# Patient Record
Sex: FEMALE | Race: OTHER | NOT HISPANIC OR LATINO | URBAN - METROPOLITAN AREA
[De-identification: names, ages, dates, MRNs, and addresses within clinical notes are randomized per-mention and may not be internally consistent; named-entity substitution may affect disease eponyms.]

---

## 2018-02-23 ENCOUNTER — EMERGENCY (EMERGENCY)
Facility: HOSPITAL | Age: 48
LOS: 0 days | Discharge: HOME | End: 2018-02-23
Attending: EMERGENCY MEDICINE
Payer: COMMERCIAL

## 2018-02-23 VITALS — TEMPERATURE: 98 F

## 2018-02-23 VITALS
RESPIRATION RATE: 18 BRPM | SYSTOLIC BLOOD PRESSURE: 132 MMHG | WEIGHT: 266.98 LBS | OXYGEN SATURATION: 98 % | HEART RATE: 90 BPM | HEIGHT: 65 IN | TEMPERATURE: 98 F | DIASTOLIC BLOOD PRESSURE: 84 MMHG

## 2018-02-23 DIAGNOSIS — Z79.4 LONG TERM (CURRENT) USE OF INSULIN: ICD-10-CM

## 2018-02-23 DIAGNOSIS — R07.9 CHEST PAIN, UNSPECIFIED: ICD-10-CM

## 2018-02-23 DIAGNOSIS — R05 COUGH: ICD-10-CM

## 2018-02-23 DIAGNOSIS — R06.02 SHORTNESS OF BREATH: ICD-10-CM

## 2018-02-23 DIAGNOSIS — Z79.1 LONG TERM (CURRENT) USE OF NON-STEROIDAL ANTI-INFLAMMATORIES (NSAID): ICD-10-CM

## 2018-02-23 DIAGNOSIS — I34.1 NONRHEUMATIC MITRAL (VALVE) PROLAPSE: ICD-10-CM

## 2018-02-23 DIAGNOSIS — I10 ESSENTIAL (PRIMARY) HYPERTENSION: ICD-10-CM

## 2018-02-23 DIAGNOSIS — E28.2 POLYCYSTIC OVARIAN SYNDROME: ICD-10-CM

## 2018-02-23 DIAGNOSIS — J40 BRONCHITIS, NOT SPECIFIED AS ACUTE OR CHRONIC: ICD-10-CM

## 2018-02-23 DIAGNOSIS — E03.9 HYPOTHYROIDISM, UNSPECIFIED: ICD-10-CM

## 2018-02-23 DIAGNOSIS — D17.9 BENIGN LIPOMATOUS NEOPLASM, UNSPECIFIED: ICD-10-CM

## 2018-02-23 DIAGNOSIS — D25.9 LEIOMYOMA OF UTERUS, UNSPECIFIED: ICD-10-CM

## 2018-02-23 DIAGNOSIS — K21.9 GASTRO-ESOPHAGEAL REFLUX DISEASE WITHOUT ESOPHAGITIS: ICD-10-CM

## 2018-02-23 DIAGNOSIS — E11.9 TYPE 2 DIABETES MELLITUS WITHOUT COMPLICATIONS: ICD-10-CM

## 2018-02-23 LAB
ALBUMIN SERPL ELPH-MCNC: 3.4 G/DL — SIGNIFICANT CHANGE UP (ref 3.3–5)
ALP SERPL-CCNC: 126 U/L — HIGH (ref 40–120)
ALT FLD-CCNC: 36 U/L — SIGNIFICANT CHANGE UP (ref 12–78)
ANION GAP SERPL CALC-SCNC: 11 MMOL/L — SIGNIFICANT CHANGE UP (ref 5–17)
APTT BLD: 28 SEC — SIGNIFICANT CHANGE UP (ref 27.5–37.4)
AST SERPL-CCNC: 19 U/L — SIGNIFICANT CHANGE UP (ref 15–37)
BASOPHILS # BLD AUTO: 0 K/UL — SIGNIFICANT CHANGE UP (ref 0–0.2)
BASOPHILS NFR BLD AUTO: 0 % — SIGNIFICANT CHANGE UP (ref 0–2)
BILIRUB SERPL-MCNC: 0.3 MG/DL — SIGNIFICANT CHANGE UP (ref 0.2–1.2)
BUN SERPL-MCNC: 10 MG/DL — SIGNIFICANT CHANGE UP (ref 7–23)
CALCIUM SERPL-MCNC: 8.9 MG/DL — SIGNIFICANT CHANGE UP (ref 8.5–10.1)
CHLORIDE SERPL-SCNC: 101 MMOL/L — SIGNIFICANT CHANGE UP (ref 96–108)
CK MB BLD-MCNC: 1.5 % — SIGNIFICANT CHANGE UP (ref 0–3.5)
CK MB CFR SERPL CALC: 0.8 NG/ML — SIGNIFICANT CHANGE UP (ref 0.5–3.6)
CK SERPL-CCNC: 52 U/L — SIGNIFICANT CHANGE UP (ref 26–192)
CO2 SERPL-SCNC: 28 MMOL/L — SIGNIFICANT CHANGE UP (ref 22–31)
CREAT SERPL-MCNC: 0.75 MG/DL — SIGNIFICANT CHANGE UP (ref 0.5–1.3)
D DIMER BLD IA.RAPID-MCNC: 188 NG/ML DDU — SIGNIFICANT CHANGE UP
EOSINOPHIL # BLD AUTO: 0 K/UL — SIGNIFICANT CHANGE UP (ref 0–0.5)
EOSINOPHIL NFR BLD AUTO: 0 % — SIGNIFICANT CHANGE UP (ref 0–6)
FLUAV SPEC QL CULT: NEGATIVE — SIGNIFICANT CHANGE UP
FLUBV AG SPEC QL IA: NEGATIVE — SIGNIFICANT CHANGE UP
GIANT PLATELETS BLD QL SMEAR: PRESENT — SIGNIFICANT CHANGE UP
GLUCOSE SERPL-MCNC: 229 MG/DL — HIGH (ref 70–99)
HCG SERPL-ACNC: 2 MIU/ML — SIGNIFICANT CHANGE UP
HCT VFR BLD CALC: 41.5 % — SIGNIFICANT CHANGE UP (ref 34.5–45)
HGB BLD-MCNC: 13.1 G/DL — SIGNIFICANT CHANGE UP (ref 11.5–15.5)
INR BLD: 0.96 RATIO — SIGNIFICANT CHANGE UP (ref 0.88–1.16)
LACTATE SERPL-SCNC: 1.9 MMOL/L — SIGNIFICANT CHANGE UP (ref 0.7–2)
LYMPHOCYTES # BLD AUTO: 4.12 K/UL — HIGH (ref 1–3.3)
LYMPHOCYTES # BLD AUTO: 41 % — SIGNIFICANT CHANGE UP (ref 13–44)
MANUAL SMEAR VERIFICATION: SIGNIFICANT CHANGE UP
MCHC RBC-ENTMCNC: 27 PG — SIGNIFICANT CHANGE UP (ref 27–34)
MCHC RBC-ENTMCNC: 31.6 GM/DL — LOW (ref 32–36)
MCV RBC AUTO: 85.6 FL — SIGNIFICANT CHANGE UP (ref 80–100)
METAMYELOCYTES # FLD: 1 — SIGNIFICANT CHANGE UP
MONOCYTES # BLD AUTO: 0.1 K/UL — SIGNIFICANT CHANGE UP (ref 0–0.9)
MONOCYTES NFR BLD AUTO: 1 % — LOW (ref 2–14)
NEUTROPHILS # BLD AUTO: 5.23 K/UL — SIGNIFICANT CHANGE UP (ref 1.8–7.4)
NEUTROPHILS NFR BLD AUTO: 52 % — SIGNIFICANT CHANGE UP (ref 43–77)
NRBC # BLD: 0 — SIGNIFICANT CHANGE UP
NRBC # BLD: SIGNIFICANT CHANGE UP /100 WBCS (ref 0–0)
NT-PROBNP SERPL-SCNC: 33 PG/ML — SIGNIFICANT CHANGE UP (ref 0–125)
PLAT MORPH BLD: NORMAL — SIGNIFICANT CHANGE UP
PLATELET # BLD AUTO: 354 K/UL — SIGNIFICANT CHANGE UP (ref 150–400)
POTASSIUM SERPL-MCNC: 3.8 MMOL/L — SIGNIFICANT CHANGE UP (ref 3.5–5.3)
POTASSIUM SERPL-SCNC: 3.8 MMOL/L — SIGNIFICANT CHANGE UP (ref 3.5–5.3)
PROT SERPL-MCNC: 8 GM/DL — SIGNIFICANT CHANGE UP (ref 6–8.3)
PROTHROM AB SERPL-ACNC: 10.5 SEC — SIGNIFICANT CHANGE UP (ref 9.8–12.7)
RBC # BLD: 4.85 M/UL — SIGNIFICANT CHANGE UP (ref 3.8–5.2)
RBC # FLD: 13.5 % — SIGNIFICANT CHANGE UP (ref 10.3–14.5)
RBC BLD AUTO: NORMAL — SIGNIFICANT CHANGE UP
SODIUM SERPL-SCNC: 140 MMOL/L — SIGNIFICANT CHANGE UP (ref 135–145)
TROPONIN I SERPL-MCNC: <.015 NG/ML — SIGNIFICANT CHANGE UP (ref 0.01–0.04)
VARIANT LYMPHS # BLD: 5 % — SIGNIFICANT CHANGE UP (ref 0–6)
WBC # BLD: 10.05 K/UL — SIGNIFICANT CHANGE UP (ref 3.8–10.5)
WBC # FLD AUTO: 10.05 K/UL — SIGNIFICANT CHANGE UP (ref 3.8–10.5)

## 2018-02-23 PROCEDURE — 71260 CT THORAX DX C+: CPT | Mod: 26

## 2018-02-23 PROCEDURE — 99285 EMERGENCY DEPT VISIT HI MDM: CPT

## 2018-02-23 PROCEDURE — 71045 X-RAY EXAM CHEST 1 VIEW: CPT | Mod: 26

## 2018-02-23 RX ORDER — ALBUTEROL 90 UG/1
2 AEROSOL, METERED ORAL
Qty: 1 | Refills: 0
Start: 2018-02-23 | End: 2018-03-24

## 2018-02-23 RX ORDER — IPRATROPIUM/ALBUTEROL SULFATE 18-103MCG
3 AEROSOL WITH ADAPTER (GRAM) INHALATION ONCE
Qty: 0 | Refills: 0 | Status: COMPLETED | OUTPATIENT
Start: 2018-02-23 | End: 2018-02-23

## 2018-02-23 RX ORDER — ALBUTEROL 90 UG/1
3 AEROSOL, METERED ORAL
Qty: 120 | Refills: 0
Start: 2018-02-23 | End: 2018-03-04

## 2018-02-23 RX ORDER — AZITHROMYCIN 500 MG/1
500 TABLET, FILM COATED ORAL ONCE
Qty: 0 | Refills: 0 | Status: COMPLETED | OUTPATIENT
Start: 2018-02-23 | End: 2018-02-23

## 2018-02-23 RX ORDER — MAGNESIUM SULFATE 500 MG/ML
2 VIAL (ML) INJECTION ONCE
Qty: 0 | Refills: 0 | Status: COMPLETED | OUTPATIENT
Start: 2018-02-23 | End: 2018-02-23

## 2018-02-23 RX ORDER — AZITHROMYCIN 500 MG/1
1 TABLET, FILM COATED ORAL
Qty: 4 | Refills: 0
Start: 2018-02-23 | End: 2018-02-26

## 2018-02-23 RX ADMIN — AZITHROMYCIN 500 MILLIGRAM(S): 500 TABLET, FILM COATED ORAL at 20:22

## 2018-02-23 RX ADMIN — Medication 50 GRAM(S): at 16:49

## 2018-02-23 RX ADMIN — Medication 125 MILLIGRAM(S): at 16:49

## 2018-02-23 RX ADMIN — Medication 3 MILLILITER(S): at 16:49

## 2018-02-23 NOTE — ED PROVIDER NOTE - PSH
Section    Cholecystectomy    h/o Removal of  Cyst of Bartholin's gland    Lipoma of back  removal 2012  S/P   x 2 in ,   S/P cholecystectomy  1984

## 2018-02-23 NOTE — ED ADULT TRIAGE NOTE - CHIEF COMPLAINT QUOTE
Shortness of breath on exertion , coughing x 1.5 months , progressively worse , fever , headaches ,   on and off chills , night sweat.   h/o reactive airways

## 2018-02-23 NOTE — ED ADULT NURSE REASSESSMENT NOTE - NS ED NURSE REASSESS COMMENT FT1
Awake, alert, Vital signs stable, tolerating fluids well.Verbal/written discharge instructions given and reviewed with patient and  with understanding. Questions answered and verbalized good understanding. Discharge criteria met. Discharged home as per protocol. Telemetry d/c, IVL removed.

## 2018-02-23 NOTE — ED ADULT NURSE NOTE - OBJECTIVE STATEMENT
46 yo female c/o SOB, flu like s/s, saw pulmonologist, s/p Azithromycin and Levaquin, coughing and fever x 1.5 months, finished antibiotic tx x 2 days ago. pmh pulmonary reactive disease from ground zero. pt speaking in complete sentences,

## 2018-02-23 NOTE — ED PROVIDER NOTE - OBJECTIVE STATEMENT
47 year old female with PMH of DM II , HTN, HLD, PCOS, previous reactive airway disease secondary to 9/11 otherwise has had cough and URI symptoms for past month, on and off improving then worsening. Pt states otherwise had taken and finished 10 day course of levaquin recently as well as continuing nebs, medrol dose pack before.
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
## 2018-02-23 NOTE — ED PROVIDER NOTE - MEDICAL DECISION MAKING DETAILS
pt otherwise without wheezing in ED that is active, to dc with prednisone and azithromycin and given follow up with pulmonology.

## 2018-08-16 ENCOUNTER — APPOINTMENT (OUTPATIENT)
Dept: ORTHOPEDIC SURGERY | Facility: CLINIC | Age: 48
End: 2018-08-16
Payer: COMMERCIAL

## 2018-08-16 VITALS — HEIGHT: 65 IN | HEART RATE: 93 BPM | DIASTOLIC BLOOD PRESSURE: 82 MMHG | SYSTOLIC BLOOD PRESSURE: 136 MMHG

## 2018-08-16 DIAGNOSIS — Z80.0 FAMILY HISTORY OF MALIGNANT NEOPLASM OF DIGESTIVE ORGANS: ICD-10-CM

## 2018-08-16 DIAGNOSIS — Z78.9 OTHER SPECIFIED HEALTH STATUS: ICD-10-CM

## 2018-08-16 DIAGNOSIS — M54.16 RADICULOPATHY, LUMBAR REGION: ICD-10-CM

## 2018-08-16 DIAGNOSIS — Z87.39 PERSONAL HISTORY OF OTHER DISEASES OF THE MUSCULOSKELETAL SYSTEM AND CONNECTIVE TISSUE: ICD-10-CM

## 2018-08-16 DIAGNOSIS — D17.9 BENIGN LIPOMATOUS NEOPLASM, UNSPECIFIED: ICD-10-CM

## 2018-08-16 DIAGNOSIS — Z86.39 PERSONAL HISTORY OF OTHER ENDOCRINE, NUTRITIONAL AND METABOLIC DISEASE: ICD-10-CM

## 2018-08-16 DIAGNOSIS — M47.812 SPONDYLOSIS W/OUT MYELOPATHY OR RADICULOPATHY, CERVICAL REGION: ICD-10-CM

## 2018-08-16 DIAGNOSIS — Z87.09 PERSONAL HISTORY OF OTHER DISEASES OF THE RESPIRATORY SYSTEM: ICD-10-CM

## 2018-08-16 DIAGNOSIS — M54.12 RADICULOPATHY, CERVICAL REGION: ICD-10-CM

## 2018-08-16 DIAGNOSIS — Z87.19 PERSONAL HISTORY OF OTHER DISEASES OF THE DIGESTIVE SYSTEM: ICD-10-CM

## 2018-08-16 PROCEDURE — 72040 X-RAY EXAM NECK SPINE 2-3 VW: CPT

## 2018-08-16 PROCEDURE — 72100 X-RAY EXAM L-S SPINE 2/3 VWS: CPT

## 2018-08-16 PROCEDURE — 99204 OFFICE O/P NEW MOD 45 MIN: CPT

## 2018-08-16 PROCEDURE — 72070 X-RAY EXAM THORAC SPINE 2VWS: CPT

## 2018-08-16 RX ORDER — GABAPENTIN ENACARBIL 300 MG/1
TABLET, EXTENDED RELEASE ORAL
Refills: 0 | Status: ACTIVE | COMMUNITY

## 2019-09-12 ENCOUNTER — EMERGENCY (EMERGENCY)
Facility: HOSPITAL | Age: 49
LOS: 0 days | Discharge: HOME | End: 2019-09-12
Attending: EMERGENCY MEDICINE
Payer: COMMERCIAL

## 2019-09-12 VITALS
TEMPERATURE: 98 F | RESPIRATION RATE: 20 BRPM | DIASTOLIC BLOOD PRESSURE: 116 MMHG | SYSTOLIC BLOOD PRESSURE: 151 MMHG | HEIGHT: 65 IN | HEART RATE: 79 BPM | WEIGHT: 279.99 LBS | OXYGEN SATURATION: 100 %

## 2019-09-12 DIAGNOSIS — I10 ESSENTIAL (PRIMARY) HYPERTENSION: ICD-10-CM

## 2019-09-12 DIAGNOSIS — D25.9 LEIOMYOMA OF UTERUS, UNSPECIFIED: ICD-10-CM

## 2019-09-12 DIAGNOSIS — N61.1 ABSCESS OF THE BREAST AND NIPPLE: ICD-10-CM

## 2019-09-12 DIAGNOSIS — E66.9 OBESITY, UNSPECIFIED: ICD-10-CM

## 2019-09-12 DIAGNOSIS — E11.9 TYPE 2 DIABETES MELLITUS WITHOUT COMPLICATIONS: ICD-10-CM

## 2019-09-12 DIAGNOSIS — J45.909 UNSPECIFIED ASTHMA, UNCOMPLICATED: ICD-10-CM

## 2019-09-12 DIAGNOSIS — I34.1 NONRHEUMATIC MITRAL (VALVE) PROLAPSE: ICD-10-CM

## 2019-09-12 DIAGNOSIS — E28.2 POLYCYSTIC OVARIAN SYNDROME: ICD-10-CM

## 2019-09-12 DIAGNOSIS — E78.5 HYPERLIPIDEMIA, UNSPECIFIED: ICD-10-CM

## 2019-09-12 DIAGNOSIS — Z79.4 LONG TERM (CURRENT) USE OF INSULIN: ICD-10-CM

## 2019-09-12 DIAGNOSIS — Q05.5 CERVICAL SPINA BIFIDA WITHOUT HYDROCEPHALUS: ICD-10-CM

## 2019-09-12 DIAGNOSIS — Q05.8 SACRAL SPINA BIFIDA WITHOUT HYDROCEPHALUS: ICD-10-CM

## 2019-09-12 DIAGNOSIS — K56.609 UNSPECIFIED INTESTINAL OBSTRUCTION, UNSPECIFIED AS TO PARTIAL VERSUS COMPLETE OBSTRUCTION: ICD-10-CM

## 2019-09-12 DIAGNOSIS — K21.9 GASTRO-ESOPHAGEAL REFLUX DISEASE WITHOUT ESOPHAGITIS: ICD-10-CM

## 2019-09-12 DIAGNOSIS — E03.9 HYPOTHYROIDISM, UNSPECIFIED: ICD-10-CM

## 2019-09-12 PROCEDURE — 99283 EMERGENCY DEPT VISIT LOW MDM: CPT

## 2019-09-12 RX ORDER — IBUPROFEN 200 MG
600 TABLET ORAL ONCE
Refills: 0 | Status: COMPLETED | OUTPATIENT
Start: 2019-09-12 | End: 2019-09-12

## 2019-09-12 RX ORDER — IBUPROFEN 200 MG
1 TABLET ORAL
Qty: 28 | Refills: 0
Start: 2019-09-12 | End: 2019-09-18

## 2019-09-12 RX ADMIN — Medication 600 MILLIGRAM(S): at 21:51

## 2019-09-12 RX ADMIN — Medication 1 TABLET(S): at 21:51

## 2019-09-12 NOTE — ED ADULT NURSE NOTE - OBJECTIVE STATEMENT
Pt a&o x3, pt states 1 year ago noticed small black head to inner left breast. pt states has been gradually getting bigger. Noted regular borders and increasing in side with pustule head. As per pt this week popped this week with draining and large black exudate came out. At this time abscess to left breast still red swollen. Pain 6/10. pt denies taking any pain meds at home.

## 2019-09-12 NOTE — ED ADULT TRIAGE NOTE - CHIEF COMPLAINT QUOTE
Pt c/o Left breast abscess that's opened and draining. Pt went was seen at urgent care and was told to come to ED. H/o DM, HTN

## 2019-09-12 NOTE — ED PROVIDER NOTE - CLINICAL SUMMARY MEDICAL DECISION MAKING FREE TEXT BOX
Ddx: Breast abscess, already draining, no fluctuance  Plan: Abx, referral to general surgery, no indication to further open as wound already draining and open. Return precautions given.

## 2019-09-12 NOTE — ED PROVIDER NOTE - PATIENT PORTAL LINK FT
You can access the FollowMyHealth Patient Portal offered by [facility] by registering at the following website: [email or web address]. By joining [employer]’s FollowMyHealth portal, you will also be able to view your health information using other applications (apps) compatible with our system.
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
## 2019-09-12 NOTE — ED PROVIDER NOTE - OBJECTIVE STATEMENT
Pt is a 48 yo lady with a pmhx of NIDM, HTN, HL who presents to the ED with breast abscess. It started 2 days ago and had a blackhead that then came out and has already drained pus. No fevers, no redness, no increase in breast tenderness.

## 2019-09-12 NOTE — ED PROVIDER NOTE - SKIN COLOR
L. medial breast has 1 cm dm opening of abscess, slight drainage of purulent fluid. Not tense, no erythema, minimal warmth. Shanell BURLESON

## 2019-12-03 ENCOUNTER — EMERGENCY (EMERGENCY)
Facility: HOSPITAL | Age: 49
LOS: 2 days | Discharge: HOME | End: 2019-12-06
Attending: EMERGENCY MEDICINE
Payer: COMMERCIAL

## 2019-12-03 DIAGNOSIS — E04.9 NONTOXIC GOITER, UNSPECIFIED: ICD-10-CM

## 2019-12-03 DIAGNOSIS — G50.0 TRIGEMINAL NEURALGIA: ICD-10-CM

## 2019-12-03 DIAGNOSIS — E11.9 TYPE 2 DIABETES MELLITUS WITHOUT COMPLICATIONS: ICD-10-CM

## 2019-12-03 DIAGNOSIS — Z79.4 LONG TERM (CURRENT) USE OF INSULIN: ICD-10-CM

## 2019-12-03 DIAGNOSIS — K21.9 GASTRO-ESOPHAGEAL REFLUX DISEASE WITHOUT ESOPHAGITIS: ICD-10-CM

## 2019-12-03 DIAGNOSIS — I10 ESSENTIAL (PRIMARY) HYPERTENSION: ICD-10-CM

## 2019-12-03 DIAGNOSIS — R07.9 CHEST PAIN, UNSPECIFIED: ICD-10-CM

## 2019-12-03 DIAGNOSIS — E78.5 HYPERLIPIDEMIA, UNSPECIFIED: ICD-10-CM

## 2019-12-03 DIAGNOSIS — J45.30 MILD PERSISTENT ASTHMA, UNCOMPLICATED: ICD-10-CM

## 2019-12-03 PROCEDURE — 99219: CPT

## 2019-12-03 PROCEDURE — 99285 EMERGENCY DEPT VISIT HI MDM: CPT

## 2019-12-04 VITALS
RESPIRATION RATE: 20 BRPM | TEMPERATURE: 98 F | SYSTOLIC BLOOD PRESSURE: 141 MMHG | OXYGEN SATURATION: 99 % | HEART RATE: 92 BPM | HEIGHT: 65 IN | WEIGHT: 270.07 LBS | DIASTOLIC BLOOD PRESSURE: 85 MMHG

## 2019-12-04 DIAGNOSIS — I10 ESSENTIAL (PRIMARY) HYPERTENSION: ICD-10-CM

## 2019-12-04 DIAGNOSIS — E11.9 TYPE 2 DIABETES MELLITUS WITHOUT COMPLICATIONS: ICD-10-CM

## 2019-12-04 DIAGNOSIS — R07.9 CHEST PAIN, UNSPECIFIED: ICD-10-CM

## 2019-12-04 DIAGNOSIS — J45.30 MILD PERSISTENT ASTHMA, UNCOMPLICATED: ICD-10-CM

## 2019-12-04 DIAGNOSIS — G50.0 TRIGEMINAL NEURALGIA: ICD-10-CM

## 2019-12-04 DIAGNOSIS — Z29.9 ENCOUNTER FOR PROPHYLACTIC MEASURES, UNSPECIFIED: ICD-10-CM

## 2019-12-04 PROBLEM — J45.909 UNSPECIFIED ASTHMA, UNCOMPLICATED: Chronic | Status: ACTIVE | Noted: 2019-09-12

## 2019-12-04 LAB
ALBUMIN SERPL ELPH-MCNC: 3 G/DL — LOW (ref 3.3–5)
ALP SERPL-CCNC: 113 U/L — SIGNIFICANT CHANGE UP (ref 40–120)
ALT FLD-CCNC: 32 U/L — SIGNIFICANT CHANGE UP (ref 12–78)
ANION GAP SERPL CALC-SCNC: 7 MMOL/L — SIGNIFICANT CHANGE UP (ref 5–17)
APTT BLD: 27.2 SEC — LOW (ref 27.5–36.3)
AST SERPL-CCNC: 27 U/L — SIGNIFICANT CHANGE UP (ref 15–37)
BASOPHILS # BLD AUTO: 0.06 K/UL — SIGNIFICANT CHANGE UP (ref 0–0.2)
BASOPHILS NFR BLD AUTO: 0.5 % — SIGNIFICANT CHANGE UP (ref 0–2)
BILIRUB SERPL-MCNC: 0.4 MG/DL — SIGNIFICANT CHANGE UP (ref 0.2–1.2)
BUN SERPL-MCNC: 14 MG/DL — SIGNIFICANT CHANGE UP (ref 7–23)
CALCIUM SERPL-MCNC: 8.9 MG/DL — SIGNIFICANT CHANGE UP (ref 8.5–10.1)
CHLORIDE SERPL-SCNC: 102 MMOL/L — SIGNIFICANT CHANGE UP (ref 96–108)
CHOLEST SERPL-MCNC: 210 MG/DL — HIGH (ref 10–199)
CO2 SERPL-SCNC: 27 MMOL/L — SIGNIFICANT CHANGE UP (ref 22–31)
CREAT SERPL-MCNC: 0.79 MG/DL — SIGNIFICANT CHANGE UP (ref 0.5–1.3)
EOSINOPHIL # BLD AUTO: 0.07 K/UL — SIGNIFICANT CHANGE UP (ref 0–0.5)
EOSINOPHIL NFR BLD AUTO: 0.6 % — SIGNIFICANT CHANGE UP (ref 0–6)
GLUCOSE BLDC GLUCOMTR-MCNC: 239 MG/DL — HIGH (ref 70–99)
GLUCOSE BLDC GLUCOMTR-MCNC: 276 MG/DL — HIGH (ref 70–99)
GLUCOSE BLDC GLUCOMTR-MCNC: 311 MG/DL — HIGH (ref 70–99)
GLUCOSE SERPL-MCNC: 295 MG/DL — HIGH (ref 70–99)
HBA1C BLD-MCNC: 12.1 % — HIGH (ref 4–5.6)
HCT VFR BLD CALC: 41.3 % — SIGNIFICANT CHANGE UP (ref 34.5–45)
HDLC SERPL-MCNC: 37 MG/DL — LOW
HGB BLD-MCNC: 13.1 G/DL — SIGNIFICANT CHANGE UP (ref 11.5–15.5)
IMM GRANULOCYTES NFR BLD AUTO: 0.5 % — SIGNIFICANT CHANGE UP (ref 0–1.5)
INR BLD: 1.03 RATIO — SIGNIFICANT CHANGE UP (ref 0.88–1.16)
LIDOCAIN IGE QN: 117 U/L — SIGNIFICANT CHANGE UP (ref 73–393)
LIPID PNL WITH DIRECT LDL SERPL: 131 MG/DL — HIGH
LYMPHOCYTES # BLD AUTO: 3.82 K/UL — HIGH (ref 1–3.3)
LYMPHOCYTES # BLD AUTO: 34 % — SIGNIFICANT CHANGE UP (ref 13–44)
MCHC RBC-ENTMCNC: 27.6 PG — SIGNIFICANT CHANGE UP (ref 27–34)
MCHC RBC-ENTMCNC: 31.7 GM/DL — LOW (ref 32–36)
MCV RBC AUTO: 87.1 FL — SIGNIFICANT CHANGE UP (ref 80–100)
MONOCYTES # BLD AUTO: 0.71 K/UL — SIGNIFICANT CHANGE UP (ref 0–0.9)
MONOCYTES NFR BLD AUTO: 6.3 % — SIGNIFICANT CHANGE UP (ref 2–14)
NEUTROPHILS # BLD AUTO: 6.52 K/UL — SIGNIFICANT CHANGE UP (ref 1.8–7.4)
NEUTROPHILS NFR BLD AUTO: 58.1 % — SIGNIFICANT CHANGE UP (ref 43–77)
NRBC # BLD: 0 /100 WBCS — SIGNIFICANT CHANGE UP (ref 0–0)
PLATELET # BLD AUTO: 256 K/UL — SIGNIFICANT CHANGE UP (ref 150–400)
POTASSIUM SERPL-MCNC: 4.2 MMOL/L — SIGNIFICANT CHANGE UP (ref 3.5–5.3)
POTASSIUM SERPL-SCNC: 4.2 MMOL/L — SIGNIFICANT CHANGE UP (ref 3.5–5.3)
PROT SERPL-MCNC: 7.8 GM/DL — SIGNIFICANT CHANGE UP (ref 6–8.3)
PROTHROM AB SERPL-ACNC: 11.5 SEC — SIGNIFICANT CHANGE UP (ref 10–12.9)
RBC # BLD: 4.74 M/UL — SIGNIFICANT CHANGE UP (ref 3.8–5.2)
RBC # FLD: 13.4 % — SIGNIFICANT CHANGE UP (ref 10.3–14.5)
SODIUM SERPL-SCNC: 136 MMOL/L — SIGNIFICANT CHANGE UP (ref 135–145)
TOTAL CHOLESTEROL/HDL RATIO MEASUREMENT: 5.7 RATIO — SIGNIFICANT CHANGE UP (ref 3.3–7.1)
TRIGL SERPL-MCNC: 211 MG/DL — HIGH (ref 10–149)
TROPONIN I SERPL-MCNC: <.015 NG/ML — SIGNIFICANT CHANGE UP (ref 0.01–0.04)
TROPONIN I SERPL-MCNC: <.015 NG/ML — SIGNIFICANT CHANGE UP (ref 0.01–0.04)
WBC # BLD: 11.24 K/UL — HIGH (ref 3.8–10.5)
WBC # FLD AUTO: 11.24 K/UL — HIGH (ref 3.8–10.5)

## 2019-12-04 PROCEDURE — 71275 CT ANGIOGRAPHY CHEST: CPT | Mod: 26

## 2019-12-04 PROCEDURE — 78452 HT MUSCLE IMAGE SPECT MULT: CPT | Mod: 26

## 2019-12-04 PROCEDURE — 93010 ELECTROCARDIOGRAM REPORT: CPT

## 2019-12-04 PROCEDURE — 99235 HOSP IP/OBS SAME DATE MOD 70: CPT

## 2019-12-04 PROCEDURE — 71045 X-RAY EXAM CHEST 1 VIEW: CPT | Mod: 26

## 2019-12-04 PROCEDURE — 93306 TTE W/DOPPLER COMPLETE: CPT | Mod: 26

## 2019-12-04 PROCEDURE — 99285 EMERGENCY DEPT VISIT HI MDM: CPT

## 2019-12-04 RX ORDER — ALBUTEROL 90 UG/1
2.5 AEROSOL, METERED ORAL EVERY 6 HOURS
Refills: 0 | Status: DISCONTINUED | OUTPATIENT
Start: 2019-12-04 | End: 2019-12-04

## 2019-12-04 RX ORDER — SODIUM CHLORIDE 9 MG/ML
1000 INJECTION, SOLUTION INTRAVENOUS
Refills: 0 | Status: DISCONTINUED | OUTPATIENT
Start: 2019-12-04 | End: 2019-12-06

## 2019-12-04 RX ORDER — PANTOPRAZOLE SODIUM 20 MG/1
40 TABLET, DELAYED RELEASE ORAL
Refills: 0 | Status: DISCONTINUED | OUTPATIENT
Start: 2019-12-04 | End: 2019-12-06

## 2019-12-04 RX ORDER — IPRATROPIUM/ALBUTEROL SULFATE 18-103MCG
3 AEROSOL WITH ADAPTER (GRAM) INHALATION ONCE
Refills: 0 | Status: COMPLETED | OUTPATIENT
Start: 2019-12-04 | End: 2019-12-04

## 2019-12-04 RX ORDER — DEXTROSE 50 % IN WATER 50 %
25 SYRINGE (ML) INTRAVENOUS ONCE
Refills: 0 | Status: DISCONTINUED | OUTPATIENT
Start: 2019-12-04 | End: 2019-12-06

## 2019-12-04 RX ORDER — GLUCAGON INJECTION, SOLUTION 0.5 MG/.1ML
1 INJECTION, SOLUTION SUBCUTANEOUS ONCE
Refills: 0 | Status: DISCONTINUED | OUTPATIENT
Start: 2019-12-04 | End: 2019-12-06

## 2019-12-04 RX ORDER — DEXTROSE 50 % IN WATER 50 %
15 SYRINGE (ML) INTRAVENOUS ONCE
Refills: 0 | Status: DISCONTINUED | OUTPATIENT
Start: 2019-12-04 | End: 2019-12-06

## 2019-12-04 RX ORDER — LISINOPRIL 2.5 MG/1
20 TABLET ORAL EVERY 12 HOURS
Refills: 0 | Status: DISCONTINUED | OUTPATIENT
Start: 2019-12-04 | End: 2019-12-06

## 2019-12-04 RX ORDER — ACETAMINOPHEN 500 MG
650 TABLET ORAL ONCE
Refills: 0 | Status: COMPLETED | OUTPATIENT
Start: 2019-12-04 | End: 2019-12-04

## 2019-12-04 RX ORDER — DEXTROSE 50 % IN WATER 50 %
12.5 SYRINGE (ML) INTRAVENOUS ONCE
Refills: 0 | Status: DISCONTINUED | OUTPATIENT
Start: 2019-12-04 | End: 2019-12-06

## 2019-12-04 RX ORDER — IPRATROPIUM/ALBUTEROL SULFATE 18-103MCG
3 AEROSOL WITH ADAPTER (GRAM) INHALATION EVERY 6 HOURS
Refills: 0 | Status: DISCONTINUED | OUTPATIENT
Start: 2019-12-04 | End: 2019-12-06

## 2019-12-04 RX ORDER — ENOXAPARIN SODIUM 100 MG/ML
40 INJECTION SUBCUTANEOUS DAILY
Refills: 0 | Status: DISCONTINUED | OUTPATIENT
Start: 2019-12-04 | End: 2019-12-06

## 2019-12-04 RX ORDER — ALBUTEROL 90 UG/1
1 AEROSOL, METERED ORAL EVERY 4 HOURS
Refills: 0 | Status: DISCONTINUED | OUTPATIENT
Start: 2019-12-04 | End: 2019-12-06

## 2019-12-04 RX ORDER — ASPIRIN/CALCIUM CARB/MAGNESIUM 324 MG
325 TABLET ORAL DAILY
Refills: 0 | Status: DISCONTINUED | OUTPATIENT
Start: 2019-12-04 | End: 2019-12-05

## 2019-12-04 RX ORDER — INSULIN LISPRO 100/ML
VIAL (ML) SUBCUTANEOUS
Refills: 0 | Status: DISCONTINUED | OUTPATIENT
Start: 2019-12-04 | End: 2019-12-06

## 2019-12-04 RX ORDER — SODIUM CHLORIDE 9 MG/ML
1000 INJECTION INTRAMUSCULAR; INTRAVENOUS; SUBCUTANEOUS ONCE
Refills: 0 | Status: COMPLETED | OUTPATIENT
Start: 2019-12-04 | End: 2019-12-04

## 2019-12-04 RX ORDER — ATORVASTATIN CALCIUM 80 MG/1
10 TABLET, FILM COATED ORAL AT BEDTIME
Refills: 0 | Status: DISCONTINUED | OUTPATIENT
Start: 2019-12-04 | End: 2019-12-06

## 2019-12-04 RX ORDER — PANTOPRAZOLE SODIUM 20 MG/1
40 TABLET, DELAYED RELEASE ORAL ONCE
Refills: 0 | Status: COMPLETED | OUTPATIENT
Start: 2019-12-04 | End: 2019-12-04

## 2019-12-04 RX ORDER — REGADENOSON 0.08 MG/ML
0.4 INJECTION, SOLUTION INTRAVENOUS ONCE
Refills: 0 | Status: COMPLETED | OUTPATIENT
Start: 2019-12-04 | End: 2019-12-04

## 2019-12-04 RX ORDER — NITROGLYCERIN 6.5 MG
0.4 CAPSULE, EXTENDED RELEASE ORAL
Refills: 0 | Status: DISCONTINUED | OUTPATIENT
Start: 2019-12-04 | End: 2019-12-06

## 2019-12-04 RX ORDER — BUDESONIDE AND FORMOTEROL FUMARATE DIHYDRATE 160; 4.5 UG/1; UG/1
2 AEROSOL RESPIRATORY (INHALATION)
Refills: 0 | Status: DISCONTINUED | OUTPATIENT
Start: 2019-12-04 | End: 2019-12-06

## 2019-12-04 RX ADMIN — Medication 650 MILLIGRAM(S): at 18:29

## 2019-12-04 RX ADMIN — Medication 325 MILLIGRAM(S): at 06:39

## 2019-12-04 RX ADMIN — ATORVASTATIN CALCIUM 10 MILLIGRAM(S): 80 TABLET, FILM COATED ORAL at 21:36

## 2019-12-04 RX ADMIN — PANTOPRAZOLE SODIUM 40 MILLIGRAM(S): 20 TABLET, DELAYED RELEASE ORAL at 22:56

## 2019-12-04 RX ADMIN — REGADENOSON 0.4 MILLIGRAM(S): 0.08 INJECTION, SOLUTION INTRAVENOUS at 12:13

## 2019-12-04 RX ADMIN — Medication 3 MILLILITER(S): at 07:46

## 2019-12-04 RX ADMIN — SODIUM CHLORIDE 1000 MILLILITER(S): 9 INJECTION INTRAMUSCULAR; INTRAVENOUS; SUBCUTANEOUS at 02:40

## 2019-12-04 RX ADMIN — BUDESONIDE AND FORMOTEROL FUMARATE DIHYDRATE 2 PUFF(S): 160; 4.5 AEROSOL RESPIRATORY (INHALATION) at 17:32

## 2019-12-04 RX ADMIN — Medication 3 MILLILITER(S): at 17:22

## 2019-12-04 RX ADMIN — SODIUM CHLORIDE 1000 MILLILITER(S): 9 INJECTION INTRAMUSCULAR; INTRAVENOUS; SUBCUTANEOUS at 03:35

## 2019-12-04 RX ADMIN — Medication 650 MILLIGRAM(S): at 17:22

## 2019-12-04 RX ADMIN — LISINOPRIL 20 MILLIGRAM(S): 2.5 TABLET ORAL at 17:23

## 2019-12-04 RX ADMIN — Medication 3 MILLILITER(S): at 23:58

## 2019-12-04 NOTE — H&P ADULT - HISTORY OF PRESENT ILLNESS
Patient is a 50 YO F with a PMH of HTN, DM, reactive airway disease, LBP secondary to multiple herniated discs and trigeminal neuralgia who presents the ED for chest pain.  Patient reports for the past three days she has had stinging chest pain substernally and underneath her L breast.  Patient reports that episodes last less than 30 seconds and denies associated symptoms like palpitations, dyspnea, lightheadedness dizziness, and syncope.  Patient reports that during one of the episodes she took 3 aspirin which significantly improved her symptoms.  Patient reports that she is non-compliant with her medications for HTN and DM.  Had labs done three days ago and was told her A1c was 12.8.  Patient has no other complaints.      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

## 2019-12-04 NOTE — H&P ADULT - NSHPLABSRESULTS_GEN_ALL_CORE
Recent Vitals  T(C): 36.6 (12-04-19 @ 00:03), Max: 36.6 (12-04-19 @ 00:03)  HR: 92 (12-04-19 @ 00:03) (92 - 92)  BP: 141/85 (12-04-19 @ 00:03) (141/85 - 141/85)  RR: 20 (12-04-19 @ 00:03) (20 - 20)  SpO2: 99% (12-04-19 @ 00:03) (99% - 99%)                        13.1   11.24 )-----------( 256      ( 04 Dec 2019 02:10 )             41.3     12-04    136  |  102  |  14  ----------------------------<  295<H>  4.2   |  27  |  0.79    Ca    8.9      04 Dec 2019 02:10    TPro  7.8  /  Alb  3.0<L>  /  TBili  0.4  /  DBili  x   /  AST  27  /  ALT  32  /  AlkPhos  113  12-04    PT/INR - ( 04 Dec 2019 02:10 )   PT: 11.5 sec;   INR: 1.03 ratio         PTT - ( 04 Dec 2019 02:10 )  PTT:27.2 sec  LIVER FUNCTIONS - ( 04 Dec 2019 02:10 )  Alb: 3.0 g/dL / Pro: 7.8 gm/dL / ALK PHOS: 113 U/L / ALT: 32 U/L / AST: 27 U/L / GGT: x               Home Medications:  amLODIPine 2.5 mg oral tablet: 1 tab(s) orally once a day (04 Dec 2019 02:00)  combiglize:    (04 Dec 2019 02:00)  hydrochlorothiazide: 12.5  orally  (12 Sep 2019 21:09)  insulin glargine: 24 unit(s) subcutaneous in the morning and at bedtime (23 Feb 2018 12:43)  lisinopril: 20  orally 2 times a day (23 Feb 2018 12:43)  Motrin:  orally  (23 Feb 2018 12:43)  Norvasc: 5  orally once a day (23 Feb 2018 12:43)

## 2019-12-04 NOTE — H&P ADULT - NSICDXPASTSURGICALHX_GEN_ALL_CORE_FT
PAST SURGICAL HISTORY:   Section     Cholecystectomy     h/o Removal of  Cyst of Bartholin's gland     Lipoma of back removal 2012    S/P  x 2 in ,     S/P cholecystectomy 1984

## 2019-12-04 NOTE — ED PROVIDER NOTE - CLINICAL SUMMARY MEDICAL DECISION MAKING FREE TEXT BOX
Patient with chest pain of unclear source.  Pain intermittent.  VSS.  Lab values reviewed, there are no values which require acute intervention, trop negative, EKG nonischemic.  No significant findings on CTA.  Based on risk factors and ongoing pain, will admit. Patient is to be admitted to the hospital and the case was discussed with the admitting physician.  Any changes in plan, additional imaging/labs, and further work up will be at the discretion of the admitting physician.

## 2019-12-04 NOTE — H&P ADULT - NSHPPHYSICALEXAM_GEN_ALL_CORE
Physical exam:  General: obese female in no acute distress, resting comfortably  Cardio: S1/S2 +ve, regular rate and rhythm, no M/G/R  Resp: decreased air entry, faint end expiratory wheezes   GI: abdomen soft, nontender, non distended, no guarding, BS +ve x 4  Ext: no significant pedal edema  Neuro: CN 2-12 intact, no significant motor or sensory deficits.

## 2019-12-04 NOTE — ED PROVIDER NOTE - OBJECTIVE STATEMENT
Pertinent PMH/PSH/FHx/SHx and Review of Systems contained within:  Patient presents to the ED for chest pain.  Patient reports 2-3 days of intermittent chest pain and dyspnea, particularly on exertion, some relief with her inhaler, but she is concerned about the pain.  She denies any calf swelling, leg pain, denies use of exogenous estrogen.  Patient has history of HTN, DM and is poorly adherent to her meds, states that she has not taken them in months.  She took aspirin today with some relief of chest pain but states that it is still present.      Relevant PMHx/SHx/SOCHx/FAMH:  HTN, DM, trigeminal neuralgia, cholecystectomy, 2 Csections  Patient denies EtOH/tobacco/illicit substance use.    ROS: No fever/chills, No headache/photophobia/eye pain/changes in vision, No ear pain/sore throat/dysphagia, No palpitations, no cough/wheeze/stridor, No abdominal pain, No N/V/D/melena, no dysuria/frequency/discharge, No neck/back pain, no rash, no changes in neurological status/function.

## 2019-12-04 NOTE — PROGRESS NOTE ADULT - PROBLEM SELECTOR PLAN 1
Nitroglycerine 0.4mg po q5min x 3 doses maximum PRN for recurrent chest pain  asa    Will send lipid panel  Troponin neg x2    Cardio consult
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
## 2019-12-04 NOTE — CONSULT NOTE ADULT - SUBJECTIVE AND OBJECTIVE BOX
CARDIOLOGY CONSULT NOTE    Patient is a 49y Female with a known history of :  Preventive measure (Z29.9)  Trigeminal neuralgia (G50.0)  Mild persistent reactive airway disease without complication (J45.30)  Type 2 diabetes mellitus without complication, without long-term current use of insulin (E11.9)  Essential hypertension (I10)  Chest pain, unspecified type (R07.9)    HPI:  50yo lady with a PMH of HTN, HL, DM, reactive airway disease, LBP secondary to multiple herniated discs and trigeminal neuralgia who presents the ED for chest pain.  Patient reports for the past three days she has had stinging chest pain substernally and underneath her L breast.  Patient reports that episodes last less than 30 seconds and denies associated symptoms like palpitations, dyspnea, lightheadedness dizziness, and syncope.  Patient reports that during one of the episodes she took 3 aspirin which significantly improved her symptoms.  Patient reports that she is non-compliant with her medications for HTN and DM... took herself off all her meds ~1yr ago.  Had labs done three days ago and was told her A1c was 12.8.  Patient has no other complaints.    Very strong FH of CAD in her family... mother s/p MIs; brother with BKA and sister blind from uncontrolled DM      REVIEW OF SYSTEMS:    CONSTITUTIONAL: No fever, weight loss, or fatigue  EYES: No eye pain, visual disturbances, or discharge  ENMT:  No difficulty hearing, tinnitus, vertigo; No sinus or throat pain  NECK: No pain or stiffness  BREASTS: No pain, masses, or nipple discharge  RESPIRATORY: No cough, wheezing, chills or hemoptysis; No shortness of breath  CARDIOVASCULAR: No chest pain, palpitations, dizziness, or leg swelling  GASTROINTESTINAL: No abdominal or epigastric pain. No nausea, vomiting, or hematemesis; No diarrhea or constipation. No melena or hematochezia.  GENITOURINARY: No dysuria, frequency, hematuria, or incontinence  NEUROLOGICAL: No headaches, memory loss, loss of strength, numbness, or tremors  SKIN: No itching, burning, rashes, or lesions   LYMPH NODES: No enlarged glands  ENDOCRINE: No heat or cold intolerance; No hair loss  MUSCULOSKELETAL: No joint pain or swelling; No muscle, back, or extremity pain  PSYCHIATRIC: No depression, anxiety, mood swings, or difficulty sleeping  HEME/LYMPH: No easy bruising, or bleeding gums  ALLERGY AND IMMUNOLOGIC: No hives or eczema    MEDICATIONS  (STANDING):  ALBUTerol    90 MICROgram(s) HFA Inhaler 1 Puff(s) Inhalation every 4 hours  albuterol/ipratropium for Nebulization. 3 milliLiter(s) Nebulizer every 6 hours  aspirin 325 milliGRAM(s) Oral daily  budesonide 160 MICROgram(s)/formoterol 4.5 MICROgram(s) Inhaler 2 Puff(s) Inhalation two times a day  enoxaparin Injectable 40 milliGRAM(s) SubCutaneous daily  insulin lispro (HumaLOG) corrective regimen sliding scale   SubCutaneous three times a day before meals  lisinopril 20 milliGRAM(s) Oral every 12 hours  regadenoson Injectable 0.4 milliGRAM(s) IV Push once    MEDICATIONS  (PRN):  dextrose 40% Gel 15 Gram(s) Oral once PRN Blood Glucose LESS THAN 70 milliGRAM(s)/deciliter  glucagon  Injectable 1 milliGRAM(s) IntraMuscular once PRN Glucose LESS THAN 70 milligrams/deciliter  nitroglycerin     SubLingual 0.4 milliGRAM(s) SubLingual every 5 minutes PRN Chest Pain      ALLERGIES: Dilaudid (Urticaria)      FAMILY HISTORY:  No pertinent family history in first degree relatives      PHYSICAL EXAMINATION:  -----------------------------  T(C): 37 (12-04-19 @ 07:24), Max: 37 (12-04-19 @ 07:24)  HR: 70 (12-04-19 @ 07:48) (70 - 92)  BP: 133/54 (12-04-19 @ 07:24) (133/54 - 141/85)  RR: 18 (12-04-19 @ 07:24) (17 - 20)  SpO2: 99% (12-04-19 @ 07:48) (97% - 99%)  Wt(kg): --    Height (cm): 165.1 (12-04 @ 00:03)  Weight (kg): 122.5 (12-04 @ 00:03)  BMI (kg/m2): 44.9 (12-04 @ 00:03)  BSA (m2): 2.25 (12-04 @ 00:03)    Constitutional: well developed, normal appearance, well groomed, well nourished, no deformities and no acute distress.   Eyes: the conjunctiva exhibited no abnormalities and the eyelids demonstrated no xanthelasmas.   HEENT: normal oral mucosa, no oral pallor and no oral cyanosis.   Neck: normal jugular venous A waves present, normal jugular venous V waves present and no jugular venous levine A waves.   Pulmonary: no respiratory distress, normal respiratory rhythm and effort, no accessory muscle use and lungs were clear to auscultation bilaterally.   Cardiovascular: heart rate and rhythm were normal, normal S1 and S2 and no murmur, gallop, rub, heave or thrill are present.   Abdomen: soft, non-tender, no hepato-splenomegaly and no abdominal mass palpated.   Musculoskeletal: the gait could not be assessed..   Extremities: no clubbing of the fingernails, no localized cyanosis, no petechial hemorrhages and no ischemic changes.   Skin: normal skin color and pigmentation, no rash, no venous stasis, no skin lesions, no skin ulcer and no xanthoma was observed.   Psychiatric: oriented to person, place, and time, the affect was normal, the mood was normal and not feeling anxious.     LABS:   --------  12-04    136  |  102  |  14  ----------------------------<  295<H>  4.2   |  27  |  0.79    Ca    8.9      04 Dec 2019 02:10    TPro  7.8  /  Alb  3.0<L>  /  TBili  0.4  /  DBili  x   /  AST  27  /  ALT  32  /  AlkPhos  113  12-04                         13.1   11.24 )-----------( 256      ( 04 Dec 2019 02:10 )             41.3     PT/INR - ( 04 Dec 2019 02:10 )   PT: 11.5 sec;   INR: 1.03 ratio         PTT - ( 04 Dec 2019 02:10 )  PTT:27.2 sec    12-04 @ 02:10 CPK total:--, CKMB --, Troponin I - <.015 ng/mL          RADIOLOGY:  -----------------    ECG: sinus 88; LVH; inf q waves CARDIOLOGY CONSULT NOTE    Patient is a 49y Female with a known history of :  Preventive measure (Z29.9)  Trigeminal neuralgia (G50.0)  Mild persistent reactive airway disease without complication (J45.30)  Type 2 diabetes mellitus without complication, without long-term current use of insulin (E11.9)  Essential hypertension (I10)  Chest pain, unspecified type (R07.9)    HPI:  50yo lady with a PMH of HTN, HL, DM, reactive airway disease, LBP secondary to multiple herniated discs and trigeminal neuralgia who presents the ED for chest pain.  Patient reports for the past three days she has had stinging chest pain substernally and underneath her L breast.  Patient reports that episodes last less than 30 seconds and denies associated symptoms like palpitations, dyspnea, lightheadedness dizziness, and syncope.  Patient reports that during one of the episodes she took 3 aspirin which significantly improved her symptoms.  Patient reports that she is non-compliant with her medications for HTN and DM... took herself off all her meds ~1yr ago.  Had labs done three days ago and was told her A1c was 12.8.  Patient has no other complaints.    Angiogram a few years ago with non-obstructive CAD as per pt (at [facility])  Very strong FH of CAD in her family... mother s/p MIs; brother with BKA and sister blind from uncontrolled DM      REVIEW OF SYSTEMS:    CONSTITUTIONAL: No fever, weight loss, or fatigue  EYES: No eye pain, visual disturbances, or discharge  ENMT:  No difficulty hearing, tinnitus, vertigo; No sinus or throat pain  NECK: No pain or stiffness  BREASTS: No pain, masses, or nipple discharge  RESPIRATORY: No cough, wheezing, chills or hemoptysis; No shortness of breath  CARDIOVASCULAR: No chest pain, palpitations, dizziness, or leg swelling  GASTROINTESTINAL: No abdominal or epigastric pain. No nausea, vomiting, or hematemesis; No diarrhea or constipation. No melena or hematochezia.  GENITOURINARY: No dysuria, frequency, hematuria, or incontinence  NEUROLOGICAL: No headaches, memory loss, loss of strength, numbness, or tremors  SKIN: No itching, burning, rashes, or lesions   LYMPH NODES: No enlarged glands  ENDOCRINE: No heat or cold intolerance; No hair loss  MUSCULOSKELETAL: No joint pain or swelling; No muscle, back, or extremity pain  PSYCHIATRIC: No depression, anxiety, mood swings, or difficulty sleeping  HEME/LYMPH: No easy bruising, or bleeding gums  ALLERGY AND IMMUNOLOGIC: No hives or eczema    MEDICATIONS  (STANDING):  ALBUTerol    90 MICROgram(s) HFA Inhaler 1 Puff(s) Inhalation every 4 hours  albuterol/ipratropium for Nebulization. 3 milliLiter(s) Nebulizer every 6 hours  aspirin 325 milliGRAM(s) Oral daily  budesonide 160 MICROgram(s)/formoterol 4.5 MICROgram(s) Inhaler 2 Puff(s) Inhalation two times a day  enoxaparin Injectable 40 milliGRAM(s) SubCutaneous daily  insulin lispro (HumaLOG) corrective regimen sliding scale   SubCutaneous three times a day before meals  lisinopril 20 milliGRAM(s) Oral every 12 hours  regadenoson Injectable 0.4 milliGRAM(s) IV Push once    MEDICATIONS  (PRN):  dextrose 40% Gel 15 Gram(s) Oral once PRN Blood Glucose LESS THAN 70 milliGRAM(s)/deciliter  glucagon  Injectable 1 milliGRAM(s) IntraMuscular once PRN Glucose LESS THAN 70 milligrams/deciliter  nitroglycerin     SubLingual 0.4 milliGRAM(s) SubLingual every 5 minutes PRN Chest Pain      ALLERGIES: Dilaudid (Urticaria)      FAMILY HISTORY:  No pertinent family history in first degree relatives      PHYSICAL EXAMINATION:  -----------------------------  T(C): 37 (12-04-19 @ 07:24), Max: 37 (12-04-19 @ 07:24)  HR: 70 (12-04-19 @ 07:48) (70 - 92)  BP: 133/54 (12-04-19 @ 07:24) (133/54 - 141/85)  RR: 18 (12-04-19 @ 07:24) (17 - 20)  SpO2: 99% (12-04-19 @ 07:48) (97% - 99%)  Wt(kg): --    Height (cm): 165.1 (12-04 @ 00:03)  Weight (kg): 122.5 (12-04 @ 00:03)  BMI (kg/m2): 44.9 (12-04 @ 00:03)  BSA (m2): 2.25 (12-04 @ 00:03)    Constitutional: well developed, normal appearance, well groomed, well nourished, no deformities and no acute distress.   Eyes: the conjunctiva exhibited no abnormalities and the eyelids demonstrated no xanthelasmas.   HEENT: normal oral mucosa, no oral pallor and no oral cyanosis.   Neck: normal jugular venous A waves present, normal jugular venous V waves present and no jugular venous levine A waves.   Pulmonary: no respiratory distress, normal respiratory rhythm and effort, no accessory muscle use and lungs were clear to auscultation bilaterally.   Cardiovascular: heart rate and rhythm were normal, normal S1 and S2 and no murmur, gallop, rub, heave or thrill are present.   Abdomen: soft, non-tender, no hepato-splenomegaly and no abdominal mass palpated.   Musculoskeletal: the gait could not be assessed..   Extremities: no clubbing of the fingernails, no localized cyanosis, no petechial hemorrhages and no ischemic changes.   Skin: normal skin color and pigmentation, no rash, no venous stasis, no skin lesions, no skin ulcer and no xanthoma was observed.   Psychiatric: oriented to person, place, and time, the affect was normal, the mood was normal and not feeling anxious.     LABS:   --------  12-04    136  |  102  |  14  ----------------------------<  295<H>  4.2   |  27  |  0.79    Ca    8.9      04 Dec 2019 02:10    TPro  7.8  /  Alb  3.0<L>  /  TBili  0.4  /  DBili  x   /  AST  27  /  ALT  32  /  AlkPhos  113  12-04                         13.1   11.24 )-----------( 256      ( 04 Dec 2019 02:10 )             41.3     PT/INR - ( 04 Dec 2019 02:10 )   PT: 11.5 sec;   INR: 1.03 ratio         PTT - ( 04 Dec 2019 02:10 )  PTT:27.2 sec    12-04 @ 02:10 CPK total:--, CKMB --, Troponin I - <.015 ng/mL          RADIOLOGY:  -----------------    ECG: sinus 88; LVH; inf q waves

## 2019-12-04 NOTE — CONSULT NOTE ADULT - ASSESSMENT
48yo lady with a PMH of HTN, HL, DM, reactive airway disease, LBP secondary to multiple herniated discs and trigeminal neuralgia who presents the ED for chest pain.  Patient reports for the past three days she has had stinging chest pain substernally and underneath her L breast.  Patient reports that episodes last less than 30 seconds and denies associated symptoms like palpitations, dyspnea, lightheadedness dizziness, and syncope.  Patient reports that during one of the episodes she took 3 aspirin which significantly improved her symptoms.  Patient reports that she is non-compliant with her medications for HTN and DM... took herself off all her meds ~1yr ago.  Had labs done three days ago and was told her A1c was 12.8.  Patient has no other complaints.    Very strong FH of CAD in her family... mother s/p MIs; brother with BKA and sister blind from uncontrolled DM    -monitor on tele  -2D echo  -stress test ordered overnight  -cont asa/ACE  -would add statin  -endo evaluation strongly encouraged 50yo lady with a PMH of HTN, HL, DM, reactive airway disease, LBP secondary to multiple herniated discs and trigeminal neuralgia who presents the ED for chest pain.  Patient reports for the past three days she has had stinging chest pain substernally and underneath her L breast.  Patient reports that episodes last less than 30 seconds and denies associated symptoms like palpitations, dyspnea, lightheadedness dizziness, and syncope.  Patient reports that during one of the episodes she took 3 aspirin which significantly improved her symptoms.  Patient reports that she is non-compliant with her medications for HTN and DM... took herself off all her meds ~1yr ago.  Had labs done three days ago and was told her A1c was 12.8.  Patient has no other complaints.    Angiogram a few years ago with non-obstructive CAD as per pt (at [facility]).  Very strong FH of CAD in her family... mother s/p MIs; brother with BKA and sister blind from uncontrolled DM    -monitor on tele  -2D echo  -stress test ordered overnight  -cont asa/ACE  -would add statin  -endo evaluation strongly encouraged

## 2019-12-04 NOTE — ED PROVIDER NOTE - PHYSICAL EXAMINATION
Gen: Alert, NAD, well appearing  Head: NC, AT, PERRL, EOMI, normal lids/conjunctiva  ENT: normal hearing, patent oropharynx without erythema/exudate, uvula midline  Neck: +supple, no tenderness/meningismus/JVD, +Trachea midline  Pulm: Bilateral BS, normal resp effort, no wheeze/stridor/retractions  CV: RRR, no M/R/G, +dist pulses  Abd: soft, NT/ND, Negative Cardwell signs, +BS, no palpable masses  Mskel: no edema/erythema/cyanosis  Skin: no rash, warm/dry  Neuro: AAOx3, no apparent sensory/motor deficits, coordination intact

## 2019-12-04 NOTE — PROGRESS NOTE ADULT - ASSESSMENT
49 F noncompliant with DM and HTN meds presents with substernal and L sided CP.  Pain improved after aspirin.  Due to risk factors, pt was placed in cardiac obs.  EKG - NSR with LVH.    Chest pain resolving but still feels a "twing". denies any dyspnea has mild chest wall tenderness.   full Stress test results pending. cardiology aware, HDS, f/u stress results

## 2019-12-04 NOTE — PROGRESS NOTE ADULT - SUBJECTIVE AND OBJECTIVE BOX
Patient is a 49y old  Female who presents with a chief complaint of CP (04 Dec 2019 11:57)      INTERVAL HPI/OVERNIGHT EVENTS: none     MEDICATIONS  (STANDING):  ALBUTerol    90 MICROgram(s) HFA Inhaler 1 Puff(s) Inhalation every 4 hours  albuterol/ipratropium for Nebulization. 3 milliLiter(s) Nebulizer every 6 hours  aspirin 325 milliGRAM(s) Oral daily  atorvastatin 10 milliGRAM(s) Oral at bedtime  budesonide 160 MICROgram(s)/formoterol 4.5 MICROgram(s) Inhaler 2 Puff(s) Inhalation two times a day  dextrose 5%. 1000 milliLiter(s) (50 mL/Hr) IV Continuous <Continuous>  dextrose 50% Injectable 12.5 Gram(s) IV Push once  dextrose 50% Injectable 25 Gram(s) IV Push once  dextrose 50% Injectable 25 Gram(s) IV Push once  enoxaparin Injectable 40 milliGRAM(s) SubCutaneous daily  insulin lispro (HumaLOG) corrective regimen sliding scale   SubCutaneous three times a day before meals  lisinopril 20 milliGRAM(s) Oral every 12 hours    MEDICATIONS  (PRN):  acetaminophen   Tablet .. 650 milliGRAM(s) Oral once PRN Mild Pain (1 - 3)  dextrose 40% Gel 15 Gram(s) Oral once PRN Blood Glucose LESS THAN 70 milliGRAM(s)/deciliter  glucagon  Injectable 1 milliGRAM(s) IntraMuscular once PRN Glucose LESS THAN 70 milligrams/deciliter  nitroglycerin     SubLingual 0.4 milliGRAM(s) SubLingual every 5 minutes PRN Chest Pain      Allergies    Dilaudid (Urticaria)    Intolerances        REVIEW OF SYSTEMS:  CONSTITUTIONAL: No fever, weight loss, or fatigue  EYES: No eye pain, visual disturbances, or discharge  ENMT:  No difficulty hearing, tinnitus, vertigo; No sinus or throat pain  NECK: No pain or stiffness  BREASTS: No pain, masses, or nipple discharge  RESPIRATORY: No cough, wheezing, chills or hemoptysis; No shortness of breath  CARDIOVASCULAR: No chest pain, palpitations, dizziness, or leg swelling  GASTROINTESTINAL: No abdominal or epigastric pain. No nausea, vomiting, or hematemesis; No diarrhea or constipation. No melena or hematochezia.  GENITOURINARY: No dysuria, frequency, hematuria, or incontinence  NEUROLOGICAL: No headaches, memory loss, loss of strength, numbness, or tremors  SKIN: No itching, burning, rashes, or lesions   LYMPH NODES: No enlarged glands  ENDOCRINE: No heat or cold intolerance; No hair loss  MUSCULOSKELETAL: No joint pain or swelling; No muscle, back, or extremity pain  PSYCHIATRIC: No depression, anxiety, mood swings, or difficulty sleeping  HEME/LYMPH: No easy bruising, or bleeding gums  ALLERGY AND IMMUNOLOGIC: No hives or eczema    Vital Signs Last 24 Hrs  T(C): 36.8 (04 Dec 2019 13:32), Max: 37 (04 Dec 2019 07:24)  T(F): 98.2 (04 Dec 2019 13:32), Max: 98.6 (04 Dec 2019 07:24)  HR: 92 (04 Dec 2019 13:32) (70 - 92)  BP: 140/75 (04 Dec 2019 13:32) (133/54 - 141/85)  BP(mean): --  RR: 17 (04 Dec 2019 13:32) (17 - 20)  SpO2: 99% (04 Dec 2019 13:32) (97% - 99%)      PHYSICAL EXAM:  GENERAL: NAD, well-groomed, well-developed  HEAD:  Atraumatic, Normocephalic  EYES: EOMI, PERRLA, conjunctiva and sclera clear  ENMT: No tonsillar erythema, exudates, or enlargement; Moist mucous membranes, Good dentition, No lesions  NECK: Supple, No JVD, Normal thyroid  NERVOUS SYSTEM:  Alert & Oriented X3, Good concentration; Motor Strength 5/5 B/L upper and lower extremities; DTRs 2+ intact and symmetric  CHEST/LUNG: Clear to percussion bilaterally; No rales, rhonchi, wheezing, or rubs  HEART: Regular rate and rhythm; No murmurs, rubs, or gallops  ABDOMEN: Soft, Nontender, Nondistended; Bowel sounds present  EXTREMITIES:  2+ Peripheral Pulses, No clubbing, cyanosis, or edema  LYMPH: No lymphadenopathy noted  SKIN: No rashes or lesions    LABS:                                       13.1   11.24 )-----------( 256      ( 04 Dec 2019 02:10 )             41.3     12-04    136  |  102  |  14  ----------------------------<  295<H>  4.2   |  27  |  0.79    Ca    8.9      04 Dec 2019 02:10    TPro  7.8  /  Alb  3.0<L>  /  TBili  0.4  /  DBili  x   /  AST  27  /  ALT  32  /  AlkPhos  113  12-04    PT/INR - ( 04 Dec 2019 02:10 )   PT: 11.5 sec;   INR: 1.03 ratio         PTT - ( 04 Dec 2019 02:10 )  PTT:27.2 sec      CAPILLARY BLOOD GLUCOSE      RADIOLOGY & ADDITIONAL TESTS:    Imaging Personally Reviewed:  [ ] YES  [ ] NO    Consultant(s) Notes Reviewed:  [ ] YES  [ ] NO    Care Discussed with Consultants/Other Providers [ ] YES  [ ] NO

## 2019-12-04 NOTE — H&P ADULT - PROBLEM SELECTOR PLAN 1
Nitroglycerine 0.4mg po q5min x 3 doses maximum PRN for recurrent chest pain  ASA 325mg po STAT then 81 mg po qd  Will send lipid panel  Troponin level x 2 q 3 hrs    TTE +- Stress testing in AM  Cardio consult

## 2019-12-05 LAB
GLUCOSE BLDC GLUCOMTR-MCNC: 264 MG/DL — HIGH (ref 70–99)
GLUCOSE BLDC GLUCOMTR-MCNC: 307 MG/DL — HIGH (ref 70–99)
GLUCOSE BLDC GLUCOMTR-MCNC: 310 MG/DL — HIGH (ref 70–99)
GLUCOSE BLDC GLUCOMTR-MCNC: 319 MG/DL — HIGH (ref 70–99)
HBA1C BLD-MCNC: 12.3 % — HIGH (ref 4–5.6)
T3FREE SERPL-MCNC: 2.37 PG/ML — SIGNIFICANT CHANGE UP (ref 1.8–4.6)
T4 FREE SERPL-MCNC: 1.2 NG/DL — SIGNIFICANT CHANGE UP (ref 0.9–1.8)
TROPONIN I SERPL-MCNC: <.015 NG/ML — SIGNIFICANT CHANGE UP (ref 0.01–0.04)
TSH SERPL-MCNC: 3.41 UIU/ML — SIGNIFICANT CHANGE UP (ref 0.36–3.74)

## 2019-12-05 PROCEDURE — 93010 ELECTROCARDIOGRAM REPORT: CPT

## 2019-12-05 PROCEDURE — 99285 EMERGENCY DEPT VISIT HI MDM: CPT

## 2019-12-05 PROCEDURE — 99235 HOSP IP/OBS SAME DATE MOD 70: CPT

## 2019-12-05 PROCEDURE — 76536 US EXAM OF HEAD AND NECK: CPT | Mod: 26

## 2019-12-05 RX ORDER — INSULIN LISPRO 100/ML
3 VIAL (ML) SUBCUTANEOUS
Refills: 0 | Status: DISCONTINUED | OUTPATIENT
Start: 2019-12-05 | End: 2019-12-06

## 2019-12-05 RX ORDER — INSULIN LISPRO 100/ML
3 VIAL (ML) SUBCUTANEOUS ONCE
Refills: 0 | Status: COMPLETED | OUTPATIENT
Start: 2019-12-05 | End: 2019-12-05

## 2019-12-05 RX ORDER — INSULIN GLARGINE 100 [IU]/ML
10 INJECTION, SOLUTION SUBCUTANEOUS AT BEDTIME
Refills: 0 | Status: DISCONTINUED | OUTPATIENT
Start: 2019-12-05 | End: 2019-12-06

## 2019-12-05 RX ORDER — METOPROLOL TARTRATE 50 MG
25 TABLET ORAL DAILY
Refills: 0 | Status: DISCONTINUED | OUTPATIENT
Start: 2019-12-05 | End: 2019-12-06

## 2019-12-05 RX ORDER — ASPIRIN/CALCIUM CARB/MAGNESIUM 324 MG
81 TABLET ORAL DAILY
Refills: 0 | Status: DISCONTINUED | OUTPATIENT
Start: 2019-12-05 | End: 2019-12-06

## 2019-12-05 RX ORDER — FAMOTIDINE 10 MG/ML
20 INJECTION INTRAVENOUS ONCE
Refills: 0 | Status: DISCONTINUED | OUTPATIENT
Start: 2019-12-05 | End: 2019-12-06

## 2019-12-05 RX ADMIN — Medication 3 MILLILITER(S): at 17:23

## 2019-12-05 RX ADMIN — BUDESONIDE AND FORMOTEROL FUMARATE DIHYDRATE 2 PUFF(S): 160; 4.5 AEROSOL RESPIRATORY (INHALATION) at 17:20

## 2019-12-05 RX ADMIN — INSULIN GLARGINE 10 UNIT(S): 100 INJECTION, SOLUTION SUBCUTANEOUS at 22:00

## 2019-12-05 RX ADMIN — Medication 3: at 17:20

## 2019-12-05 RX ADMIN — LISINOPRIL 20 MILLIGRAM(S): 2.5 TABLET ORAL at 17:19

## 2019-12-05 RX ADMIN — Medication 3 UNIT(S): at 17:21

## 2019-12-05 RX ADMIN — Medication 3 UNIT(S): at 23:16

## 2019-12-05 RX ADMIN — BUDESONIDE AND FORMOTEROL FUMARATE DIHYDRATE 2 PUFF(S): 160; 4.5 AEROSOL RESPIRATORY (INHALATION) at 06:04

## 2019-12-05 RX ADMIN — Medication 4: at 11:59

## 2019-12-05 RX ADMIN — PANTOPRAZOLE SODIUM 40 MILLIGRAM(S): 20 TABLET, DELAYED RELEASE ORAL at 06:05

## 2019-12-05 RX ADMIN — Medication 81 MILLIGRAM(S): at 12:13

## 2019-12-05 RX ADMIN — Medication 3 MILLILITER(S): at 05:16

## 2019-12-05 RX ADMIN — Medication 30 MILLILITER(S): at 13:12

## 2019-12-05 RX ADMIN — Medication 4: at 07:34

## 2019-12-05 RX ADMIN — ATORVASTATIN CALCIUM 10 MILLIGRAM(S): 80 TABLET, FILM COATED ORAL at 22:06

## 2019-12-05 RX ADMIN — LISINOPRIL 20 MILLIGRAM(S): 2.5 TABLET ORAL at 06:05

## 2019-12-05 RX ADMIN — Medication 3 MILLILITER(S): at 12:56

## 2019-12-05 NOTE — PROGRESS NOTE ADULT - ASSESSMENT
49 F noncompliant with DM and HTN meds presents with substernal and L sided CP.  Pain improved after aspirin.  Due to risk factors, pt was placed in cardiac obs.  EKG - NSR with LVH.    Had negative stress test and troponins negative, but still has mild chest pressure, but improved pain. denies dyspnea. denies epigastric pain or sour taste in her mouth   Cardiology offered cardiac cath but pt refused. She would like more time to see if the pain resolves as she feels its gastric or  pulmonary even though we said this is less likely.  Lungs are clear on exam. Pt understands the chest pressure could be cardiac related and will reconsider cath tomorrow.

## 2019-12-05 NOTE — PROGRESS NOTE ADULT - PROBLEM SELECTOR PLAN 3
insulin corrective scale  A1c 12   seen by endocrine and started on insulin.   can be dc on metformin with close f/u

## 2019-12-05 NOTE — CONSULT NOTE ADULT - PROBLEM SELECTOR RECOMMENDATION 9
while inpatient , add Lantus 10 units and prandial lispro 3 units   also hold Metformin if patient is headed for cardiac Cath   Metformin should be started soon as out-patient   Patient should have strict diet control and do exercise as tolerated upon discharge to lose weight   also has Metabolic syndrome and PCOS which can be worked up as out-patient   If cardiac cath is not decided upon , can be discharged on Metformin 500 mg BID   Thank You for the courtesy of this consultation !!!

## 2019-12-05 NOTE — DIETITIAN INITIAL EVALUATION ADULT. - PERTINENT LABORATORY DATA
12-04 Na136 mmol/L Glu 295 mg/dL<H> K+ 4.2 mmol/L Cr  0.79 mg/dL BUN 14 mg/dL 12-04 Alb 3.0 g/dL<L> 12-05 [id number] 12.3 %<H> 12-04 Chol 210 mg/dL<H>  mg/dL<H> HDL 37 mg/dL<L> Trig 211 mg/dL<H>12-04 ALT 32 U/L AST 27 U/L Alkaline Phosphatase 113 U/L
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
## 2019-12-05 NOTE — PROGRESS NOTE ADULT - SUBJECTIVE AND OBJECTIVE BOX
Patient is a 49y old  Female who presents with a chief complaint of CP (04 Dec 2019 11:57)      INTERVAL HPI/OVERNIGHT EVENTS: still c/o of some mild chest pressure but denies any sob or orthopnea     MEDICATIONS  (STANDING):  ALBUTerol    90 MICROgram(s) HFA Inhaler 1 Puff(s) Inhalation every 4 hours  albuterol/ipratropium for Nebulization. 3 milliLiter(s) Nebulizer every 6 hours  aspirin 325 milliGRAM(s) Oral daily  atorvastatin 10 milliGRAM(s) Oral at bedtime  budesonide 160 MICROgram(s)/formoterol 4.5 MICROgram(s) Inhaler 2 Puff(s) Inhalation two times a day  dextrose 5%. 1000 milliLiter(s) (50 mL/Hr) IV Continuous <Continuous>  dextrose 50% Injectable 12.5 Gram(s) IV Push once  dextrose 50% Injectable 25 Gram(s) IV Push once  dextrose 50% Injectable 25 Gram(s) IV Push once  enoxaparin Injectable 40 milliGRAM(s) SubCutaneous daily  insulin lispro (HumaLOG) corrective regimen sliding scale   SubCutaneous three times a day before meals  lisinopril 20 milliGRAM(s) Oral every 12 hours    MEDICATIONS  (PRN):  acetaminophen   Tablet .. 650 milliGRAM(s) Oral once PRN Mild Pain (1 - 3)  dextrose 40% Gel 15 Gram(s) Oral once PRN Blood Glucose LESS THAN 70 milliGRAM(s)/deciliter  glucagon  Injectable 1 milliGRAM(s) IntraMuscular once PRN Glucose LESS THAN 70 milligrams/deciliter  nitroglycerin     SubLingual 0.4 milliGRAM(s) SubLingual every 5 minutes PRN Chest Pain      Allergies    Dilaudid (Urticaria)    Intolerances        REVIEW OF SYSTEMS:  CONSTITUTIONAL: No fever, weight loss, or fatigue  EYES: No eye pain, visual disturbances, or discharge  ENMT:  No difficulty hearing, tinnitus, vertigo; No sinus or throat pain  RESPIRATORY: No cough, wheezing, chills or hemoptysis; No shortness of breath  CARDIOVASCULAR: No palpitations, dizziness, or leg swelling  GASTROINTESTINAL: No abdominal or epigastric pain. No nausea, vomiting, or hematemesis; No diarrhea or constipation. No melena or hematochezia.  GENITOURINARY: No dysuria, frequency, hematuria, or incontinence  NEUROLOGICAL: No headaches, memory loss, loss of strength, numbness, or tremors  SKIN: No itching, burning, rashes, or lesions   LYMPH NODES: No enlarged glands  ENDOCRINE: No heat or cold intolerance; No hair loss  MUSCULOSKELETAL: No joint pain or swelling; No muscle, back, or extremity pain  PSYCHIATRIC: No depression, anxiety, mood swings, or difficulty sleeping  HEME/LYMPH: No easy bruising, or bleeding gums  ALLERGY AND IMMUNOLOGIC: No hives or eczema      Vital Signs Last 24 Hrs  T(C): 36.3 (05 Dec 2019 05:19), Max: 36.6 (04 Dec 2019 17:04)  T(F): 97.3 (05 Dec 2019 05:19), Max: 97.9 (04 Dec 2019 17:04)  HR: 66 (05 Dec 2019 13:07) (66 - 90)  BP: 131/68 (05 Dec 2019 05:19) (117/65 - 149/88)  BP(mean): --  RR: 18 (05 Dec 2019 05:19) (18 - 18)  SpO2: 99% (05 Dec 2019 13:07) (93% - 100%)    PHYSICAL EXAM:  GENERAL: NAD, well-groomed, well-developed  HEAD:  Atraumatic, Normocephalic  EYES: EOMI, PERRLA, conjunctiva and sclera clear  ENMT: No tonsillar erythema, exudates, or enlargement; Moist mucous membranes, Good dentition, No lesions  NECK: Supple, No JVD, Normal thyroid  NERVOUS SYSTEM:  Alert & Oriented X3, Good concentration; Motor Strength 5/5 B/L upper and lower extremities; DTRs 2+ intact and symmetric  CHEST/LUNG: Clear to percussion bilaterally; No rales, rhonchi, wheezing, or rubs  HEART: Regular rate and rhythm; No murmurs, rubs, or gallops  ABDOMEN: Soft, Nontender, Nondistended; Bowel sounds present  EXTREMITIES:  2+ Peripheral Pulses, No clubbing, cyanosis, or edema  LYMPH: No lymphadenopathy noted  SKIN: No rashes or lesions    LABS:                                            13.1   11.24 )-----------( 256      ( 04 Dec 2019 02:10 )             41.3     12-04    136  |  102  |  14  ----------------------------<  295<H>  4.2   |  27  |  0.79    Ca    8.9      04 Dec 2019 02:10    TPro  7.8  /  Alb  3.0<L>  /  TBili  0.4  /  DBili  x   /  AST  27  /  ALT  32  /  AlkPhos  113  12-04    PT/INR - ( 04 Dec 2019 02:10 )   PT: 11.5 sec;   INR: 1.03 ratio         PTT - ( 04 Dec 2019 02:10 )  PTT:27.2 sec          CAPILLARY BLOOD GLUCOSE      RADIOLOGY & ADDITIONAL TESTS:    Imaging Personally Reviewed:  [ ] YES  [ ] NO    Consultant(s) Notes Reviewed:  [ ] YES  [ ] NO    Care Discussed with Consultants/Other Providers [ ] YES  [ ] NO
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
## 2019-12-05 NOTE — DIETITIAN INITIAL EVALUATION ADULT. - PERTINENT MEDS FT
MEDICATIONS  (STANDING):  ALBUTerol    90 MICROgram(s) HFA Inhaler 1 Puff(s) Inhalation every 4 hours  albuterol/ipratropium for Nebulization. 3 milliLiter(s) Nebulizer every 6 hours  aspirin  chewable 81 milliGRAM(s) Oral daily  atorvastatin 10 milliGRAM(s) Oral at bedtime  budesonide 160 MICROgram(s)/formoterol 4.5 MICROgram(s) Inhaler 2 Puff(s) Inhalation two times a day  dextrose 5%. 1000 milliLiter(s) (50 mL/Hr) IV Continuous <Continuous>  dextrose 50% Injectable 12.5 Gram(s) IV Push once  dextrose 50% Injectable 25 Gram(s) IV Push once  dextrose 50% Injectable 25 Gram(s) IV Push once  enoxaparin Injectable 40 milliGRAM(s) SubCutaneous daily  insulin glargine Injectable (LANTUS) 10 Unit(s) SubCutaneous at bedtime  insulin lispro (HumaLOG) corrective regimen sliding scale   SubCutaneous three times a day before meals  insulin lispro Injectable (HumaLOG) 3 Unit(s) SubCutaneous before breakfast  insulin lispro Injectable (HumaLOG) 3 Unit(s) SubCutaneous before lunch  insulin lispro Injectable (HumaLOG) 3 Unit(s) SubCutaneous before dinner  lisinopril 20 milliGRAM(s) Oral every 12 hours  pantoprazole    Tablet 40 milliGRAM(s) Oral before breakfast    MEDICATIONS  (PRN):  dextrose 40% Gel 15 Gram(s) Oral once PRN Blood Glucose LESS THAN 70 milliGRAM(s)/deciliter  glucagon  Injectable 1 milliGRAM(s) IntraMuscular once PRN Glucose LESS THAN 70 milligrams/deciliter  nitroglycerin     SubLingual 0.4 milliGRAM(s) SubLingual every 5 minutes PRN Chest Pain

## 2019-12-05 NOTE — PROGRESS NOTE ADULT - PROBLEM SELECTOR PLAN 1
Nitroglycerine 0.4mg po q5min x 3 doses maximum PRN for recurrent chest pain  asa   Troponin neg x2    Cardio on board

## 2019-12-05 NOTE — DIETITIAN INITIAL EVALUATION ADULT. - OTHER INFO
Pt admitted w/ HgbA1c = 12.1 %, non compliant w/ diet and medication for HTN and DM2. Pt receptive to Out patient diabetes wellness program ; weight loss tips; Diabetes meal planning plate method and Type 2 DM nutrition therapy. Pt also has not been checking her FS x many years. She can verbalize foods containing carbohydrates and is interested in seeking out advice on ways to improve her overall health and well being.

## 2019-12-05 NOTE — PROGRESS NOTE ADULT - ASSESSMENT
50yo lady with a PMH of HTN, HL, DM, reactive airway disease, LBP secondary to multiple herniated discs and trigeminal neuralgia who presents the ED for chest pain.  Patient reports for the past three days she has had stinging chest pain substernally and underneath her L breast.  Patient reports that episodes last less than 30 seconds and denies associated symptoms like palpitations, dyspnea, lightheadedness dizziness, and syncope.  Patient also reports that during one of the episodes she took 3 aspirin which significantly improved her symptoms.  Patient states that she is non-compliant with her medications for HTN and DM, took herself off all her meds ~1yr ago.  Had labs done three days ago and was told her A1c was 12.8.  Patient has no other complaints.    Angiogram a few years ago with non-obstructive CAD as per pt (at [facility]).  Very strong FH of CAD in her family, mother s/p MIs; brother with BKA and sister blind from uncontrolled DM  Trop neg x3  pt currently c/o occasional chest discomfort and with pressure     EKG Repeated, unchanged from before   2D echo with normal EF, no significant valvular lesions noted, mild pHTN  stress test normal   cont asa/ACE/statin  cont PPI  Cont Rx for reactive airway disease  endo consult   Offered cardiac cath as pt is still with c/o occasional chest discomfort, pt prefers not to have cardiac cath, instead 50yo lady with a PMH of HTN, HL, DM, reactive airway disease, LBP secondary to multiple herniated discs and trigeminal neuralgia who presents the ED for chest pain.  Patient reports for the past three days she has had stinging chest pain substernally and underneath her L breast.  Patient reports that episodes last less than 30 seconds and denies associated symptoms like palpitations, dyspnea, lightheadedness dizziness, and syncope.  Patient also reports that during one of the episodes she took 3 aspirin which significantly improved her symptoms.  Patient states that she is non-compliant with her medications for HTN and DM, took herself off all her meds ~1yr ago.  Had labs done three days ago and was told her A1c was 12.8.  Patient has no other complaints.    Angiogram a few years ago with non-obstructive CAD as per pt (at [facility]).  Very strong FH of CAD in her family, mother s/p MIs; brother with BKA and sister blind from uncontrolled DM  Trop neg x3  pt currently c/o occasional chest discomfort and with pressure     EKG Repeated, unchanged from before   2D echo with normal EF, no significant valvular lesions noted, mild pHTN  stress test normal   cont asa/ACE/statin  cont PPI  Cont Rx for reactive airway disease  endo consult   Offered cardiac cath as pt is still with c/o occasional chest discomfort, pt prefers not to have cardiac cath, instead pt is requesting to be seen by GI for an endoscopy and pulm chely, D/W Dr. Dixon/Dr. Reid

## 2019-12-06 ENCOUNTER — TRANSCRIPTION ENCOUNTER (OUTPATIENT)
Age: 49
End: 2019-12-06

## 2019-12-06 VITALS — OXYGEN SATURATION: 98 %

## 2019-12-06 LAB
ANION GAP SERPL CALC-SCNC: 5 MMOL/L — SIGNIFICANT CHANGE UP (ref 5–17)
BUN SERPL-MCNC: 13 MG/DL — SIGNIFICANT CHANGE UP (ref 7–23)
CALCIUM SERPL-MCNC: 8.8 MG/DL — SIGNIFICANT CHANGE UP (ref 8.5–10.1)
CHLORIDE SERPL-SCNC: 103 MMOL/L — SIGNIFICANT CHANGE UP (ref 96–108)
CO2 SERPL-SCNC: 29 MMOL/L — SIGNIFICANT CHANGE UP (ref 22–31)
CREAT SERPL-MCNC: 0.83 MG/DL — SIGNIFICANT CHANGE UP (ref 0.5–1.3)
GLUCOSE BLDC GLUCOMTR-MCNC: 339 MG/DL — HIGH (ref 70–99)
GLUCOSE BLDC GLUCOMTR-MCNC: 346 MG/DL — HIGH (ref 70–99)
GLUCOSE SERPL-MCNC: 331 MG/DL — HIGH (ref 70–99)
HCT VFR BLD CALC: 36.7 % — SIGNIFICANT CHANGE UP (ref 34.5–45)
HGB BLD-MCNC: 11.4 G/DL — LOW (ref 11.5–15.5)
MCHC RBC-ENTMCNC: 27.1 PG — SIGNIFICANT CHANGE UP (ref 27–34)
MCHC RBC-ENTMCNC: 31.1 GM/DL — LOW (ref 32–36)
MCV RBC AUTO: 87.2 FL — SIGNIFICANT CHANGE UP (ref 80–100)
NRBC # BLD: 0 /100 WBCS — SIGNIFICANT CHANGE UP (ref 0–0)
PLATELET # BLD AUTO: 196 K/UL — SIGNIFICANT CHANGE UP (ref 150–400)
POTASSIUM SERPL-MCNC: 3.9 MMOL/L — SIGNIFICANT CHANGE UP (ref 3.5–5.3)
POTASSIUM SERPL-SCNC: 3.9 MMOL/L — SIGNIFICANT CHANGE UP (ref 3.5–5.3)
RBC # BLD: 4.21 M/UL — SIGNIFICANT CHANGE UP (ref 3.8–5.2)
RBC # FLD: 13.4 % — SIGNIFICANT CHANGE UP (ref 10.3–14.5)
SODIUM SERPL-SCNC: 137 MMOL/L — SIGNIFICANT CHANGE UP (ref 135–145)
TROPONIN I SERPL-MCNC: <.015 NG/ML — SIGNIFICANT CHANGE UP (ref 0.01–0.04)
WBC # BLD: 8.23 K/UL — SIGNIFICANT CHANGE UP (ref 3.8–10.5)
WBC # FLD AUTO: 8.23 K/UL — SIGNIFICANT CHANGE UP (ref 3.8–10.5)

## 2019-12-06 PROCEDURE — 99217: CPT

## 2019-12-06 RX ORDER — LISINOPRIL 2.5 MG/1
1 TABLET ORAL
Qty: 60 | Refills: 0
Start: 2019-12-06 | End: 2020-01-04

## 2019-12-06 RX ORDER — GLIMEPIRIDE 1 MG
1 TABLET ORAL
Qty: 30 | Refills: 0
Start: 2019-12-06 | End: 2020-01-04

## 2019-12-06 RX ORDER — PANTOPRAZOLE SODIUM 20 MG/1
1 TABLET, DELAYED RELEASE ORAL
Qty: 30 | Refills: 0
Start: 2019-12-06 | End: 2020-01-04

## 2019-12-06 RX ORDER — BUDESONIDE AND FORMOTEROL FUMARATE DIHYDRATE 160; 4.5 UG/1; UG/1
2 AEROSOL RESPIRATORY (INHALATION)
Qty: 0 | Refills: 0 | DISCHARGE
Start: 2019-12-06 | End: 2020-01-04

## 2019-12-06 RX ORDER — ATORVASTATIN CALCIUM 80 MG/1
1 TABLET, FILM COATED ORAL
Qty: 30 | Refills: 0
Start: 2019-12-06 | End: 2020-01-04

## 2019-12-06 RX ORDER — AMLODIPINE BESYLATE 2.5 MG/1
1 TABLET ORAL
Qty: 0 | Refills: 0 | DISCHARGE

## 2019-12-06 RX ORDER — BUDESONIDE AND FORMOTEROL FUMARATE DIHYDRATE 160; 4.5 UG/1; UG/1
1 AEROSOL RESPIRATORY (INHALATION)
Qty: 10 | Refills: 0
Start: 2019-12-06 | End: 2020-01-04

## 2019-12-06 RX ORDER — ASPIRIN/CALCIUM CARB/MAGNESIUM 324 MG
1 TABLET ORAL
Qty: 30 | Refills: 0
Start: 2019-12-06 | End: 2020-01-04

## 2019-12-06 RX ORDER — CARVEDILOL PHOSPHATE 80 MG/1
1 CAPSULE, EXTENDED RELEASE ORAL
Qty: 60 | Refills: 0
Start: 2019-12-06 | End: 2020-01-04

## 2019-12-06 RX ORDER — METFORMIN HYDROCHLORIDE 850 MG/1
1 TABLET ORAL
Qty: 60 | Refills: 0
Start: 2019-12-06 | End: 2020-01-04

## 2019-12-06 RX ADMIN — Medication 3 UNIT(S): at 08:26

## 2019-12-06 RX ADMIN — Medication 81 MILLIGRAM(S): at 11:27

## 2019-12-06 RX ADMIN — Medication 3 MILLILITER(S): at 05:19

## 2019-12-06 RX ADMIN — BUDESONIDE AND FORMOTEROL FUMARATE DIHYDRATE 2 PUFF(S): 160; 4.5 AEROSOL RESPIRATORY (INHALATION) at 05:11

## 2019-12-06 RX ADMIN — Medication 30 MILLILITER(S): at 11:51

## 2019-12-06 RX ADMIN — Medication 3 MILLILITER(S): at 00:15

## 2019-12-06 RX ADMIN — Medication 4: at 08:26

## 2019-12-06 RX ADMIN — PANTOPRAZOLE SODIUM 40 MILLIGRAM(S): 20 TABLET, DELAYED RELEASE ORAL at 05:59

## 2019-12-06 RX ADMIN — Medication 4: at 11:26

## 2019-12-06 RX ADMIN — Medication 3 UNIT(S): at 11:26

## 2019-12-06 RX ADMIN — LISINOPRIL 20 MILLIGRAM(S): 2.5 TABLET ORAL at 05:58

## 2019-12-06 RX ADMIN — Medication 3 MILLILITER(S): at 11:12

## 2019-12-06 NOTE — CHART NOTE - NSCHARTNOTEFT_GEN_A_CORE
Upon Nutritional Assessment by the Registered Dietitian your patient was determined to meet criteria / has evidence of the following diagnosis/diagnoses:          [ ]  Mild Protein Calorie Malnutrition        [ ]  Moderate Protein Calorie Malnutrition        [ ] Severe Protein Calorie Malnutrition        [ ] Unspecified Protein Calorie Malnutrition        [ ] Underweight / BMI <19        [ x] Morbid Obesity / BMI > 40      Findings as based on:  •  Comprehensive nutrition assessment and consultation  •  Calorie counts (nutrient intake analysis)  •  Food acceptance and intake status from observations by staff  •  Follow up  •  Patient education  •  Intervention secondary to interdisciplinary rounds  •   concerns      Treatment:    The following diet has been recommended: Dash/TLC ; CCHO w/ evening snack ; 1800 calorie controlled      PROVIDER Section:     By signing this assessment you are acknowledging and agree with the diagnosis/diagnoses assigned by the Registered Dietitian    Comments:

## 2019-12-06 NOTE — DISCHARGE NOTE PROVIDER - CARE PROVIDERS DIRECT ADDRESSES
,[email or web address],DirectAddress_Unknown,DirectAddress_Unknown

## 2019-12-06 NOTE — DISCHARGE NOTE NURSING/CASE MANAGEMENT/SOCIAL WORK - PATIENT PORTAL LINK FT
You can access the FollowMyHealth Patient Portal offered by [facility] by registering at the following website: [email or web address]. By joining [employer]’s FollowMyHealth portal, you will also be able to view your health information using other applications (apps) compatible with our system.

## 2019-12-06 NOTE — CHART NOTE - NSCHARTNOTEFT_GEN_A_CORE
Debby Cordoba was admitted to the hospital from 12/1-12/3/19 and needs clearance from her primary care doctor prior to restarting. Debby Cordoba was admitted to the hospital from 12/3-12/6/19 and needs clearance from her primary care doctor prior to restarting.

## 2019-12-06 NOTE — DISCHARGE NOTE PROVIDER - HOSPITAL COURSE
49 F noncompliant with DM and HTN meds presents with substernal and L sided CP.  Pain improved after aspirin.  Due to risk factors, pt was placed in cardiac obs.  EKG - NSR with LVH.      Had negative stress test and troponin negative x3,  but still has mild chest pressure, but improved pain. denies dyspnea. denies epigastric pain or sour taste in her mouth     Cardiology offered cardiac cath but pt refused. She remained overnight but and had an episode of chest pressure again  and again was offered cardiac angiogram  but still refused. Troponin remains negative. Pt requesting to be discharged and will follow up with her cardiologist and pcp. Risks of not getting angiogram were discussed which included sudden cardiac death and patient understands.                  Problem/Plan - 1:    ·  Problem: Chest pain, unspecified type.  Plan:    add carvedilol and statin    asa     Troponin neg x3      Cardio on board.          Problem/Plan - 2:    ·  Problem: Essential hypertension.  Plan: BP controlled in ED.      Continue lisinopril 20 mg bID.          Problem/Plan - 3:    ·  Problem: Type 2 diabetes mellitus without complication, without long-term current use of insulin.  Plan: insulin corrective scale    A1c 12     seen by endocrine and started on insulin.     can be dc on metformin with close f/u.          Problem/Plan - 4:    ·  Problem: Mild persistent reactive airway disease without complication.  Plan: lungs clear     Ventolin, Symbicort.          Problem/Plan - 5:    ·  Problem: nodular goiter     - US done and will need biopsy as an outpatient     - TFTs negative

## 2019-12-06 NOTE — PROGRESS NOTE ADULT - PROBLEM SELECTOR PLAN 1
while inpt would increae lantus to 12units and lispro to 4units with meals + LANRE   advised pt start insulin upon d/c, refused  planned to be d/c on metformin bid, would add at least glimepiride 2mg daily (ema gallagher discussed) strict diet and monitoring  planned to see sandi kapoor 1wk for further adjustment in her regimen

## 2019-12-06 NOTE — DISCHARGE NOTE PROVIDER - PROVIDER TOKENS
PROVIDER:[TOKEN:[[id number]]],PROVIDER:[TOKEN:[[id number]]],FREE:[LAST:[pcp],PHONE:[(   )    -],FAX:[(   )    -]]

## 2019-12-06 NOTE — PROGRESS NOTE ADULT - SUBJECTIVE AND OBJECTIVE BOX
Patient is a 49y old  Female who presents with a chief complaint of CP (06 Dec 2019 09:20)      INTERVAL HPI/OVERNIGHT EVENTS:  pt wiht no complaints  planned for d/c  admits was not taking any meds for DM prior to admit    MEDICATIONS  (STANDING):  ALBUTerol    90 MICROgram(s) HFA Inhaler 1 Puff(s) Inhalation every 4 hours  albuterol/ipratropium for Nebulization. 3 milliLiter(s) Nebulizer every 6 hours  aspirin  chewable 81 milliGRAM(s) Oral daily  atorvastatin 10 milliGRAM(s) Oral at bedtime  budesonide 160 MICROgram(s)/formoterol 4.5 MICROgram(s) Inhaler 2 Puff(s) Inhalation two times a day  dextrose 5%. 1000 milliLiter(s) (50 mL/Hr) IV Continuous <Continuous>  dextrose 50% Injectable 12.5 Gram(s) IV Push once  dextrose 50% Injectable 25 Gram(s) IV Push once  dextrose 50% Injectable 25 Gram(s) IV Push once  enoxaparin Injectable 40 milliGRAM(s) SubCutaneous daily  famotidine Injectable 20 milliGRAM(s) IV Push once  insulin glargine Injectable (LANTUS) 10 Unit(s) SubCutaneous at bedtime  insulin lispro (HumaLOG) corrective regimen sliding scale   SubCutaneous three times a day before meals  insulin lispro Injectable (HumaLOG) 3 Unit(s) SubCutaneous before breakfast  insulin lispro Injectable (HumaLOG) 3 Unit(s) SubCutaneous before lunch  insulin lispro Injectable (HumaLOG) 3 Unit(s) SubCutaneous before dinner  lisinopril 20 milliGRAM(s) Oral every 12 hours  metoprolol succinate ER 25 milliGRAM(s) Oral daily  pantoprazole    Tablet 40 milliGRAM(s) Oral before breakfast    MEDICATIONS  (PRN):  dextrose 40% Gel 15 Gram(s) Oral once PRN Blood Glucose LESS THAN 70 milliGRAM(s)/deciliter  glucagon  Injectable 1 milliGRAM(s) IntraMuscular once PRN Glucose LESS THAN 70 milligrams/deciliter  nitroglycerin     SubLingual 0.4 milliGRAM(s) SubLingual every 5 minutes PRN Chest Pain      REVIEW OF SYSTEMS:  CONSTITUTIONAL: No fever, weight loss, or fatigue  RESPIRATORY: No cough, wheezing, chills or hemoptysis; No shortness of breath  CARDIOVASCULAR: No chest pain, palpitations, dizziness, or leg swelling  GASTROINTESTINAL: No abdominal or epigastric pain. No nausea, vomiting, or hematemesis; No diarrhea or constipation. No melena or hematochezia.  ENDOCRINE: No heat or cold intolerance; No hair loss      Vital Signs Last 24 Hrs  T(C): 36.7 (06 Dec 2019 11:05), Max: 36.7 (05 Dec 2019 17:38)  T(F): 98 (06 Dec 2019 11:05), Max: 98 (05 Dec 2019 17:38)  HR: 75 (06 Dec 2019 12:26) (63 - 90)  BP: 138/72 (06 Dec 2019 11:05) (124/73 - 150/77)  BP(mean): --  RR: 18 (06 Dec 2019 11:05) (18 - 18)  SpO2: 98% (06 Dec 2019 12:26) (96% - 98%)    PHYSICAL EXAM:  GENERAL: NAD, well-groomed, well-developed        LABS:                        11.4   8.23  )-----------( 196      ( 06 Dec 2019 06:33 )             36.7     12-06    137  |  103  |  13  ----------------------------<  331<H>  3.9   |  29  |  0.83    Ca    8.8      06 Dec 2019 06:33          CAPILLARY BLOOD GLUCOSE      POCT Blood Glucose.: 346 mg/dL (06 Dec 2019 10:57)  POCT Blood Glucose.: 339 mg/dL (06 Dec 2019 07:39)  POCT Blood Glucose.: 319 mg/dL (05 Dec 2019 21:14)  POCT Blood Glucose.: 264 mg/dL (05 Dec 2019 17:18)    Lipid panel:   CARDIAC MARKERS ( 06 Dec 2019 06:33 )  <.015 ng/mL / x     / x     / x     / x      CARDIAC MARKERS ( 05 Dec 2019 07:25 )  <.015 ng/mL / x     / x     / x     / x      CARDIAC MARKERS ( 04 Dec 2019 14:57 )  <.015 ng/mL / x     / x     / x     / x                  RADIOLOGY & ADDITIONAL TESTS:

## 2019-12-06 NOTE — DISCHARGE NOTE PROVIDER - NSDCMRMEDTOKEN_GEN_ALL_CORE_FT
albuterol 2.5 mg/3 mL (0.083%) inhalation solution: 3 milliliter(s) inhaled 4 times a day  aspirin 81 mg oral tablet, chewable: 1 tab(s) orally once a day  budesonide-formoterol 160 mcg-4.5 mcg/inh inhalation aerosol: 1 puff(s) inhaled once a day   carvedilol 3.125 mg oral tablet: 1 tab(s) orally 2 times a day   combiglize:     Lipitor 40 mg oral tablet: 1 tab(s) orally once a day  lisinopril 20 mg oral tablet: 1 tab(s) orally 2 times a day   metFORMIN 500 mg oral tablet: 1 tab(s) orally 2 times a day   pantoprazole 40 mg oral delayed release tablet: 1 tab(s) orally once a day (before a meal)  Ventolin HFA 90 mcg/inh inhalation aerosol: 2 puff(s) inhaled 4 times a day

## 2019-12-06 NOTE — DISCHARGE NOTE PROVIDER - CARE PROVIDER_API CALL
Sonya Reid)  Cardiology; Interventional Cardiology  [address], [place] [id number]  Phone: [phone]  Fax: [phone]  Follow Up Time:     Feliberto Eastman)  EndocrinologyMetabDiabetes  [address], [address]  [place] [address]  Phone: [phone]  Fax: [phone]  Follow Up Time:     pcp,   Phone: (   )    -  Fax: (   )    -  Follow Up Time:

## 2019-12-06 NOTE — DISCHARGE NOTE PROVIDER - NSDCCPCAREPLAN_GEN_ALL_CORE_FT
PRINCIPAL DISCHARGE DIAGNOSIS  Diagnosis: Chest pain, unspecified type  Assessment and Plan of Treatment: 49 F noncompliant with DM and HTN meds presents with substernal and L sided CP.  Pain improved after aspirin.  Due to risk factors, pt was placed in cardiac obs.  EKG - NSR with LVH.    Had negative stress test and troponin negative x3,  but still has mild chest pressure, but improved pain. denies dyspnea. denies epigastric pain or sour taste in her mouth   Cardiology offered cardiac cath but pt refused. She remained overnight but and had an episode of chest pressure again  and again was offered cardiac angiogram  but still refused. Troponin remains negative. Pt requesting to be discharged and will follow up with her cardiologist and pcp. Risks of not getting angiogram were discussed which included sudden cardiac death and patient understands.    Problem/Plan - 1:  ·  Problem: Chest pain, unspecified type.  Plan:  add carvedilol and statin  asa   Troponin neg x3    Cardio on board.    Problem/Plan - 2:  ·  Problem: Essential hypertension.  Plan: BP controlled in ED.    Continue lisinopril 20 mg bID.    Problem/Plan - 3:  ·  Problem: Type 2 diabetes mellitus without complication, without long-term current use of insulin.  Plan: insulin corrective scale  A1c 12   seen by endocrine and started on insulin.   can be dc on metformin with close f/u.    Problem/Plan - 4:  ·  Problem: Mild persistent reactive airway disease without complication.  Plan: lungs clear   Ventolin, Symbicort.    Problem/Plan - 5:  ·  Problem: nodular goiter   - US done and will need biopsy as an outpatient   - TFTs negative

## 2019-12-09 ENCOUNTER — INBOUND DOCUMENT (OUTPATIENT)
Age: 49
End: 2019-12-09

## 2019-12-16 ENCOUNTER — INPATIENT (INPATIENT)
Facility: HOSPITAL | Age: 49
LOS: 3 days | Discharge: HOME | DRG: 641 | End: 2019-12-20
Attending: HOSPITALIST | Admitting: HOSPITALIST
Payer: COMMERCIAL

## 2019-12-16 VITALS
HEART RATE: 78 BPM | HEIGHT: 65 IN | WEIGHT: 259.93 LBS | RESPIRATION RATE: 16 BRPM | TEMPERATURE: 98 F | DIASTOLIC BLOOD PRESSURE: 83 MMHG | SYSTOLIC BLOOD PRESSURE: 170 MMHG | OXYGEN SATURATION: 100 %

## 2019-12-16 LAB
ALBUMIN SERPL ELPH-MCNC: 4.3 G/DL — SIGNIFICANT CHANGE UP (ref 3.3–5)
ALP SERPL-CCNC: 114 U/L — SIGNIFICANT CHANGE UP (ref 40–120)
ALT FLD-CCNC: 33 U/L — SIGNIFICANT CHANGE UP (ref 10–45)
ANION GAP SERPL CALC-SCNC: 13 MMOL/L — SIGNIFICANT CHANGE UP (ref 5–17)
AST SERPL-CCNC: 19 U/L — SIGNIFICANT CHANGE UP (ref 10–40)
BASOPHILS # BLD AUTO: 0.08 K/UL — SIGNIFICANT CHANGE UP (ref 0–0.2)
BASOPHILS NFR BLD AUTO: 0.7 % — SIGNIFICANT CHANGE UP (ref 0–2)
BILIRUB SERPL-MCNC: 0.2 MG/DL — SIGNIFICANT CHANGE UP (ref 0.2–1.2)
BUN SERPL-MCNC: 10 MG/DL — SIGNIFICANT CHANGE UP (ref 7–23)
CALCIUM SERPL-MCNC: 10 MG/DL — SIGNIFICANT CHANGE UP (ref 8.4–10.5)
CHLORIDE SERPL-SCNC: 101 MMOL/L — SIGNIFICANT CHANGE UP (ref 96–108)
CK SERPL-CCNC: 80 U/L — SIGNIFICANT CHANGE UP (ref 25–170)
CO2 SERPL-SCNC: 24 MMOL/L — SIGNIFICANT CHANGE UP (ref 22–31)
CREAT SERPL-MCNC: 0.6 MG/DL — SIGNIFICANT CHANGE UP (ref 0.5–1.3)
EOSINOPHIL # BLD AUTO: 0.05 K/UL — SIGNIFICANT CHANGE UP (ref 0–0.5)
EOSINOPHIL NFR BLD AUTO: 0.4 % — SIGNIFICANT CHANGE UP (ref 0–6)
GLUCOSE SERPL-MCNC: 143 MG/DL — HIGH (ref 70–99)
HCG SERPL-ACNC: <2 MIU/ML — SIGNIFICANT CHANGE UP
HCT VFR BLD CALC: 42.6 % — SIGNIFICANT CHANGE UP (ref 34.5–45)
HGB BLD-MCNC: 13.5 G/DL — SIGNIFICANT CHANGE UP (ref 11.5–15.5)
IMM GRANULOCYTES NFR BLD AUTO: 0.5 % — SIGNIFICANT CHANGE UP (ref 0–1.5)
LYMPHOCYTES # BLD AUTO: 3.79 K/UL — HIGH (ref 1–3.3)
LYMPHOCYTES # BLD AUTO: 33.4 % — SIGNIFICANT CHANGE UP (ref 13–44)
MAGNESIUM SERPL-MCNC: 1.2 MG/DL — LOW (ref 1.6–2.6)
MCHC RBC-ENTMCNC: 27.7 PG — SIGNIFICANT CHANGE UP (ref 27–34)
MCHC RBC-ENTMCNC: 31.7 GM/DL — LOW (ref 32–36)
MCV RBC AUTO: 87.5 FL — SIGNIFICANT CHANGE UP (ref 80–100)
MONOCYTES # BLD AUTO: 0.73 K/UL — SIGNIFICANT CHANGE UP (ref 0–0.9)
MONOCYTES NFR BLD AUTO: 6.4 % — SIGNIFICANT CHANGE UP (ref 2–14)
NEUTROPHILS # BLD AUTO: 6.65 K/UL — SIGNIFICANT CHANGE UP (ref 1.8–7.4)
NEUTROPHILS NFR BLD AUTO: 58.6 % — SIGNIFICANT CHANGE UP (ref 43–77)
NRBC # BLD: 0 /100 WBCS — SIGNIFICANT CHANGE UP (ref 0–0)
NT-PROBNP SERPL-SCNC: 19 PG/ML — SIGNIFICANT CHANGE UP (ref 0–300)
PHOSPHATE SERPL-MCNC: 3.1 MG/DL — SIGNIFICANT CHANGE UP (ref 2.5–4.5)
PLATELET # BLD AUTO: 253 K/UL — SIGNIFICANT CHANGE UP (ref 150–400)
POTASSIUM SERPL-MCNC: 3.8 MMOL/L — SIGNIFICANT CHANGE UP (ref 3.5–5.3)
POTASSIUM SERPL-SCNC: 3.8 MMOL/L — SIGNIFICANT CHANGE UP (ref 3.5–5.3)
PROT SERPL-MCNC: 8.6 G/DL — HIGH (ref 6–8.3)
RBC # BLD: 4.87 M/UL — SIGNIFICANT CHANGE UP (ref 3.8–5.2)
RBC # FLD: 13.2 % — SIGNIFICANT CHANGE UP (ref 10.3–14.5)
SODIUM SERPL-SCNC: 138 MMOL/L — SIGNIFICANT CHANGE UP (ref 135–145)
TROPONIN T, HIGH SENSITIVITY RESULT: <6 NG/L — SIGNIFICANT CHANGE UP (ref 0–51)
WBC # BLD: 11.36 K/UL — HIGH (ref 3.8–10.5)
WBC # FLD AUTO: 11.36 K/UL — HIGH (ref 3.8–10.5)

## 2019-12-16 PROCEDURE — 93010 ELECTROCARDIOGRAM REPORT: CPT

## 2019-12-16 PROCEDURE — 99284 EMERGENCY DEPT VISIT MOD MDM: CPT

## 2019-12-16 RX ORDER — SODIUM CHLORIDE 9 MG/ML
1000 INJECTION INTRAMUSCULAR; INTRAVENOUS; SUBCUTANEOUS ONCE
Refills: 0 | Status: COMPLETED | OUTPATIENT
Start: 2019-12-16 | End: 2019-12-16

## 2019-12-16 NOTE — ED ADULT NURSE NOTE - OBJECTIVE STATEMENT
48 yo F pmh of mitral valve prolapse, DM2, GERD, HTN, Hypothyroidism BIBA to ED c/o palpitations, and CP with SOB, and tingling to the left arm starting today.  Pt states that she was in [facility] two weeks ago for similar complaints and had full work-up done.  Denies CP, palpitaitons currently, fevers/chills, n/v/d, lightheadedness, dizziness, changes in urinary or bowel habits.  A&Ox4, gross neuro intact, placed on CM, NSR, EKG performed.  VSS.  Lungs clear bilaterally, NAD, NARD.  Skin w/d/i.  Safety and comfort maintained.  IV established and blood specimens sent to lab.  Will continue to monitor.

## 2019-12-16 NOTE — ED PROVIDER NOTE - CLINICAL SUMMARY MEDICAL DECISION MAKING FREE TEXT BOX
50 yo F c PMH of HTN, HLD, DM p/w 3 hour h/o substernal and L sided non-radiating chest pressure that started at rest, improved after she took 243 mg of ASA, then recurred when she started walking again. On exam, /83, VS otherwise wnl, EKG NSR, CXR wnl, trop < 6 to 17. pt already took ASA. lab grossly unremarkable. will admit for ACS w/u and consult with cardiology

## 2019-12-16 NOTE — ED PROVIDER NOTE - OBJECTIVE STATEMENT
48 yo F c PMH of HTN, HLD, DM p/w 3 hour h/o substernal and L sided non-radiating chest pressure 48 yo F c PMH of HTN, HLD, DM p/w 3 hour h/o substernal and L sided non-radiating chest pressure that started at rest, improved after she took 243 mg of ASA, then recurred when she started walking again. states she felt "winded" and SOB at the time that has improved. non-pleuritic, non-positional. positive h/o similar sx, was hospitalized Dec 3 for four days for similar sx, had normal ekg, cardiac enzymes, stress, and echo. 8 y/a had normal cath. FH of early CAD in parents. no recent travel.

## 2019-12-16 NOTE — ED PROVIDER NOTE - ATTENDING CONTRIBUTION TO CARE
49 M w/ PMH of HTN, DM, trigeminal neuralgia, cholecystectomy, 2 c sections, presents to the ED w/ nonradiating substernal cp that occurred at 7PM, lasted for   took aspirin and symptoms improved, no nausea no vomiting,   ?sob w/ cp "I felt winded" 49 M w/ PMH of HTN, DM, trigeminal neuralgia, cholecystectomy, 2 c sections, presents to the ED w/ nonradiating substernal cp that occurred at 7PM, lasted for afew mins and resolved  took aspirin and symptoms improved, no nausea no vomiting,   ?sob w/ cp "I felt winded"  on exam, pt is anxious, in not acute distress,   I have reviewed the triage vital signs.    Const: Well-nourished, Well-developed, appearing stated age  Head: atraumatic, normocephalic  Eyes: no conjunctival injection and no scleral icterus  ENMT: Atraumatic external nose and ears, Moist mucus membranes  Neck: Symmetric, trachea midline,  CVS: +S1/S2, dorsalis pedis/radial pulse 2+ bilaterally  RESP: Unlabored respiratory effort, Clear to auscultation bilaterally  GI: Nontender/Nondistended, soft abdomen  MSK: Extremities w/o deformity or ttp   Skin: Warm, Dry w/ no rashes  Neuro: GCS=15, CNs II-XII grossly intact, motor in all 4 extremities equal, Sensation grossly intact   Psych: Awake, Alert, & Orientedx3;  Appropriate mood and affect, cooperative w/ anxiety  pt was recently admitted and was offered transfer to [facility] for cath, initial ekg w/ no signifiant ekg changes, will obtain troponin and likely admit for a cath.

## 2019-12-16 NOTE — ED ADULT NURSE NOTE - NSIMPLEMENTINTERV_GEN_ALL_ED
Implemented All Universal Safety Interventions:  [place] to call system. Call bell, personal items and telephone within reach. Instruct patient to call for assistance. Room bathroom lighting operational. Non-slip footwear when patient is off stretcher. Physically safe environment: no spills, clutter or unnecessary equipment. Stretcher in lowest position, wheels locked, appropriate side rails in place.

## 2019-12-17 DIAGNOSIS — J45.909 UNSPECIFIED ASTHMA, UNCOMPLICATED: ICD-10-CM

## 2019-12-17 DIAGNOSIS — R07.89 OTHER CHEST PAIN: ICD-10-CM

## 2019-12-17 DIAGNOSIS — E11.9 TYPE 2 DIABETES MELLITUS WITHOUT COMPLICATIONS: ICD-10-CM

## 2019-12-17 DIAGNOSIS — E04.2 NONTOXIC MULTINODULAR GOITER: ICD-10-CM

## 2019-12-17 DIAGNOSIS — I34.1 NONRHEUMATIC MITRAL (VALVE) PROLAPSE: ICD-10-CM

## 2019-12-17 DIAGNOSIS — R91.1 SOLITARY PULMONARY NODULE: ICD-10-CM

## 2019-12-17 DIAGNOSIS — D72.829 ELEVATED WHITE BLOOD CELL COUNT, UNSPECIFIED: ICD-10-CM

## 2019-12-17 DIAGNOSIS — E11.40 TYPE 2 DIABETES MELLITUS WITH DIABETIC NEUROPATHY, UNSPECIFIED: ICD-10-CM

## 2019-12-17 DIAGNOSIS — E78.2 MIXED HYPERLIPIDEMIA: ICD-10-CM

## 2019-12-17 DIAGNOSIS — R07.9 CHEST PAIN, UNSPECIFIED: ICD-10-CM

## 2019-12-17 DIAGNOSIS — I10 ESSENTIAL (PRIMARY) HYPERTENSION: ICD-10-CM

## 2019-12-17 DIAGNOSIS — Z02.9 ENCOUNTER FOR ADMINISTRATIVE EXAMINATIONS, UNSPECIFIED: ICD-10-CM

## 2019-12-17 DIAGNOSIS — E83.42 HYPOMAGNESEMIA: ICD-10-CM

## 2019-12-17 LAB
ANION GAP SERPL CALC-SCNC: 10 MMOL/L — SIGNIFICANT CHANGE UP (ref 5–17)
APTT BLD: 32.3 SEC — SIGNIFICANT CHANGE UP (ref 27.5–36.3)
BASOPHILS # BLD AUTO: 0.04 K/UL — SIGNIFICANT CHANGE UP (ref 0–0.2)
BASOPHILS NFR BLD AUTO: 0.4 % — SIGNIFICANT CHANGE UP (ref 0–2)
BUN SERPL-MCNC: 8 MG/DL — SIGNIFICANT CHANGE UP (ref 7–23)
CALCIUM SERPL-MCNC: 9 MG/DL — SIGNIFICANT CHANGE UP (ref 8.4–10.5)
CHLORIDE SERPL-SCNC: 103 MMOL/L — SIGNIFICANT CHANGE UP (ref 96–108)
CK MB CFR SERPL CALC: 2.2 NG/ML — SIGNIFICANT CHANGE UP (ref 0–3.8)
CO2 SERPL-SCNC: 26 MMOL/L — SIGNIFICANT CHANGE UP (ref 22–31)
CREAT SERPL-MCNC: 0.57 MG/DL — SIGNIFICANT CHANGE UP (ref 0.5–1.3)
EOSINOPHIL # BLD AUTO: 0.03 K/UL — SIGNIFICANT CHANGE UP (ref 0–0.5)
EOSINOPHIL NFR BLD AUTO: 0.3 % — SIGNIFICANT CHANGE UP (ref 0–6)
GLUCOSE BLDC GLUCOMTR-MCNC: 124 MG/DL — HIGH (ref 70–99)
GLUCOSE BLDC GLUCOMTR-MCNC: 136 MG/DL — HIGH (ref 70–99)
GLUCOSE BLDC GLUCOMTR-MCNC: 192 MG/DL — HIGH (ref 70–99)
GLUCOSE BLDC GLUCOMTR-MCNC: 194 MG/DL — HIGH (ref 70–99)
GLUCOSE SERPL-MCNC: 136 MG/DL — HIGH (ref 70–99)
HCT VFR BLD CALC: 36 % — SIGNIFICANT CHANGE UP (ref 34.5–45)
HGB BLD-MCNC: 11.4 G/DL — LOW (ref 11.5–15.5)
IMM GRANULOCYTES NFR BLD AUTO: 0.3 % — SIGNIFICANT CHANGE UP (ref 0–1.5)
INR BLD: 0.97 RATIO — SIGNIFICANT CHANGE UP (ref 0.88–1.16)
LYMPHOCYTES # BLD AUTO: 3.86 K/UL — HIGH (ref 1–3.3)
LYMPHOCYTES # BLD AUTO: 38.9 % — SIGNIFICANT CHANGE UP (ref 13–44)
MAGNESIUM SERPL-MCNC: 1.5 MG/DL — LOW (ref 1.6–2.6)
MCHC RBC-ENTMCNC: 27.7 PG — SIGNIFICANT CHANGE UP (ref 27–34)
MCHC RBC-ENTMCNC: 31.7 GM/DL — LOW (ref 32–36)
MCV RBC AUTO: 87.6 FL — SIGNIFICANT CHANGE UP (ref 80–100)
MONOCYTES # BLD AUTO: 0.63 K/UL — SIGNIFICANT CHANGE UP (ref 0–0.9)
MONOCYTES NFR BLD AUTO: 6.3 % — SIGNIFICANT CHANGE UP (ref 2–14)
NEUTROPHILS # BLD AUTO: 5.34 K/UL — SIGNIFICANT CHANGE UP (ref 1.8–7.4)
NEUTROPHILS NFR BLD AUTO: 53.8 % — SIGNIFICANT CHANGE UP (ref 43–77)
NRBC # BLD: 0 /100 WBCS — SIGNIFICANT CHANGE UP (ref 0–0)
PHOSPHATE SERPL-MCNC: 3.5 MG/DL — SIGNIFICANT CHANGE UP (ref 2.5–4.5)
PLATELET # BLD AUTO: 217 K/UL — SIGNIFICANT CHANGE UP (ref 150–400)
POTASSIUM SERPL-MCNC: 3.5 MMOL/L — SIGNIFICANT CHANGE UP (ref 3.5–5.3)
POTASSIUM SERPL-SCNC: 3.5 MMOL/L — SIGNIFICANT CHANGE UP (ref 3.5–5.3)
PROTHROM AB SERPL-ACNC: 11.1 SEC — SIGNIFICANT CHANGE UP (ref 10–12.9)
RBC # BLD: 4.11 M/UL — SIGNIFICANT CHANGE UP (ref 3.8–5.2)
RBC # FLD: 13.2 % — SIGNIFICANT CHANGE UP (ref 10.3–14.5)
SODIUM SERPL-SCNC: 139 MMOL/L — SIGNIFICANT CHANGE UP (ref 135–145)
TROPONIN T, HIGH SENSITIVITY RESULT: 14 NG/L — SIGNIFICANT CHANGE UP (ref 0–51)
TROPONIN T, HIGH SENSITIVITY RESULT: 17 NG/L — SIGNIFICANT CHANGE UP (ref 0–51)
TSH SERPL-MCNC: 4.75 UIU/ML — HIGH (ref 0.27–4.2)
WBC # BLD: 9.93 K/UL — SIGNIFICANT CHANGE UP (ref 3.8–10.5)
WBC # FLD AUTO: 9.93 K/UL — SIGNIFICANT CHANGE UP (ref 3.8–10.5)

## 2019-12-17 PROCEDURE — 99223 1ST HOSP IP/OBS HIGH 75: CPT

## 2019-12-17 PROCEDURE — 99255 IP/OBS CONSLTJ NEW/EST HI 80: CPT

## 2019-12-17 PROCEDURE — 71046 X-RAY EXAM CHEST 2 VIEWS: CPT | Mod: 26

## 2019-12-17 PROCEDURE — 12345: CPT | Mod: NC

## 2019-12-17 RX ORDER — ENOXAPARIN SODIUM 100 MG/ML
40 INJECTION SUBCUTANEOUS DAILY
Refills: 0 | Status: DISCONTINUED | OUTPATIENT
Start: 2019-12-17 | End: 2019-12-20

## 2019-12-17 RX ORDER — DEXTROSE 50 % IN WATER 50 %
25 SYRINGE (ML) INTRAVENOUS ONCE
Refills: 0 | Status: DISCONTINUED | OUTPATIENT
Start: 2019-12-17 | End: 2019-12-20

## 2019-12-17 RX ORDER — ALBUTEROL 90 UG/1
2 AEROSOL, METERED ORAL EVERY 6 HOURS
Refills: 0 | Status: DISCONTINUED | OUTPATIENT
Start: 2019-12-17 | End: 2019-12-20

## 2019-12-17 RX ORDER — LISINOPRIL 2.5 MG/1
20 TABLET ORAL
Refills: 0 | Status: DISCONTINUED | OUTPATIENT
Start: 2019-12-17 | End: 2019-12-20

## 2019-12-17 RX ORDER — AMLODIPINE BESYLATE 2.5 MG/1
5 TABLET ORAL DAILY
Refills: 0 | Status: DISCONTINUED | OUTPATIENT
Start: 2019-12-17 | End: 2019-12-17

## 2019-12-17 RX ORDER — MAGNESIUM SULFATE 500 MG/ML
1 VIAL (ML) INJECTION ONCE
Refills: 0 | Status: COMPLETED | OUTPATIENT
Start: 2019-12-17 | End: 2019-12-17

## 2019-12-17 RX ORDER — AMLODIPINE BESYLATE 2.5 MG/1
10 TABLET ORAL DAILY
Refills: 0 | Status: DISCONTINUED | OUTPATIENT
Start: 2019-12-18 | End: 2019-12-20

## 2019-12-17 RX ORDER — ASPIRIN/CALCIUM CARB/MAGNESIUM 324 MG
81 TABLET ORAL DAILY
Refills: 0 | Status: DISCONTINUED | OUTPATIENT
Start: 2019-12-17 | End: 2019-12-20

## 2019-12-17 RX ORDER — DEXTROSE 50 % IN WATER 50 %
15 SYRINGE (ML) INTRAVENOUS ONCE
Refills: 0 | Status: DISCONTINUED | OUTPATIENT
Start: 2019-12-17 | End: 2019-12-20

## 2019-12-17 RX ORDER — INSULIN LISPRO 100/ML
VIAL (ML) SUBCUTANEOUS AT BEDTIME
Refills: 0 | Status: DISCONTINUED | OUTPATIENT
Start: 2019-12-17 | End: 2019-12-20

## 2019-12-17 RX ORDER — INSULIN GLARGINE 100 [IU]/ML
24 INJECTION, SOLUTION SUBCUTANEOUS AT BEDTIME
Refills: 0 | Status: DISCONTINUED | OUTPATIENT
Start: 2019-12-17 | End: 2019-12-20

## 2019-12-17 RX ORDER — AMLODIPINE BESYLATE 2.5 MG/1
5 TABLET ORAL ONCE
Refills: 0 | Status: COMPLETED | OUTPATIENT
Start: 2019-12-17 | End: 2019-12-17

## 2019-12-17 RX ORDER — ATORVASTATIN CALCIUM 80 MG/1
40 TABLET, FILM COATED ORAL AT BEDTIME
Refills: 0 | Status: DISCONTINUED | OUTPATIENT
Start: 2019-12-17 | End: 2019-12-20

## 2019-12-17 RX ORDER — SODIUM CHLORIDE 9 MG/ML
1000 INJECTION, SOLUTION INTRAVENOUS
Refills: 0 | Status: DISCONTINUED | OUTPATIENT
Start: 2019-12-17 | End: 2019-12-20

## 2019-12-17 RX ORDER — INSULIN LISPRO 100/ML
8 VIAL (ML) SUBCUTANEOUS
Refills: 0 | Status: DISCONTINUED | OUTPATIENT
Start: 2019-12-18 | End: 2019-12-20

## 2019-12-17 RX ORDER — BUDESONIDE AND FORMOTEROL FUMARATE DIHYDRATE 160; 4.5 UG/1; UG/1
2 AEROSOL RESPIRATORY (INHALATION)
Refills: 0 | Status: DISCONTINUED | OUTPATIENT
Start: 2019-12-17 | End: 2019-12-20

## 2019-12-17 RX ORDER — INSULIN LISPRO 100/ML
VIAL (ML) SUBCUTANEOUS
Refills: 0 | Status: DISCONTINUED | OUTPATIENT
Start: 2019-12-17 | End: 2019-12-20

## 2019-12-17 RX ORDER — GLUCAGON INJECTION, SOLUTION 0.5 MG/.1ML
1 INJECTION, SOLUTION SUBCUTANEOUS ONCE
Refills: 0 | Status: DISCONTINUED | OUTPATIENT
Start: 2019-12-17 | End: 2019-12-20

## 2019-12-17 RX ORDER — DEXTROSE 50 % IN WATER 50 %
12.5 SYRINGE (ML) INTRAVENOUS ONCE
Refills: 0 | Status: DISCONTINUED | OUTPATIENT
Start: 2019-12-17 | End: 2019-12-20

## 2019-12-17 RX ADMIN — BUDESONIDE AND FORMOTEROL FUMARATE DIHYDRATE 2 PUFF(S): 160; 4.5 AEROSOL RESPIRATORY (INHALATION) at 05:52

## 2019-12-17 RX ADMIN — LISINOPRIL 20 MILLIGRAM(S): 2.5 TABLET ORAL at 17:58

## 2019-12-17 RX ADMIN — Medication 2: at 13:07

## 2019-12-17 RX ADMIN — AMLODIPINE BESYLATE 5 MILLIGRAM(S): 2.5 TABLET ORAL at 17:58

## 2019-12-17 RX ADMIN — SODIUM CHLORIDE 1000 MILLILITER(S): 9 INJECTION INTRAMUSCULAR; INTRAVENOUS; SUBCUTANEOUS at 00:24

## 2019-12-17 RX ADMIN — Medication 100 GRAM(S): at 03:45

## 2019-12-17 RX ADMIN — LISINOPRIL 20 MILLIGRAM(S): 2.5 TABLET ORAL at 05:51

## 2019-12-17 RX ADMIN — Medication 81 MILLIGRAM(S): at 17:58

## 2019-12-17 RX ADMIN — Medication 2: at 17:58

## 2019-12-17 RX ADMIN — Medication 100 GRAM(S): at 10:40

## 2019-12-17 RX ADMIN — AMLODIPINE BESYLATE 5 MILLIGRAM(S): 2.5 TABLET ORAL at 05:51

## 2019-12-17 RX ADMIN — BUDESONIDE AND FORMOTEROL FUMARATE DIHYDRATE 2 PUFF(S): 160; 4.5 AEROSOL RESPIRATORY (INHALATION) at 17:59

## 2019-12-17 RX ADMIN — ATORVASTATIN CALCIUM 40 MILLIGRAM(S): 80 TABLET, FILM COATED ORAL at 21:55

## 2019-12-17 RX ADMIN — INSULIN GLARGINE 24 UNIT(S): 100 INJECTION, SOLUTION SUBCUTANEOUS at 21:53

## 2019-12-17 NOTE — H&P ADULT - ASSESSMENT
49F w/ uncontrolled DM2 (A1c12.3), HTN, MVP, obesity, multinodular thyroid, reactive airway disease (WTC), and recent admit to [facility] for chest pain (d/c 12/6/19) presents to [facility] for evaluation of atypical chest pain

## 2019-12-17 NOTE — H&P ADULT - PROBLEM SELECTOR PLAN 10
Transitions of Care Status:  1.  Name of PCP: Dr. Cass Gonzalez  2.  PCP Contacted on Admission: [ ] Y    [ ] N    3.  PCP contacted at Discharge: [ ] Y    [ ] N    [ ] N/A  4.  Post-Discharge Appointment Date and Location:  5.  Summary of Handoff given to PCP:

## 2019-12-17 NOTE — CONSULT NOTE ADULT - PROBLEM SELECTOR RECOMMENDATION 2
-earlier this month TSH 1.34, FT4 1,2 so non-toxic  -her outpatient endo has told her that he would assess it at her next appt 2/3/20

## 2019-12-17 NOTE — H&P ADULT - NSHPPHYSICALEXAM_GEN_ALL_CORE
Vital Signs Last 24 Hrs  T(C): 36.7 (17 Dec 2019 02:19), Max: 36.8 (16 Dec 2019 22:18)  T(F): 98.1 (17 Dec 2019 02:19), Max: 98.3 (16 Dec 2019 22:18)  HR: 80 (17 Dec 2019 02:19) (78 - 80)  BP: 131/90 (17 Dec 2019 02:19) (131/90 - 170/83)  RR: 15 (17 Dec 2019 02:19) (15 - 16)  SpO2: 98% (17 Dec 2019 02:19) (98% - 100%) on room air    GENERAL: NAD, obese  HEAD:  Atraumatic, Normocephalic  EYES: EOMI, PERRLA, conjunctiva and sclera clear  Mouth: MMM, no lesions  NECK: Supple, thyromegaly+  Lung: normal work of breathing, cta b/l  Chest: S1&S2+, rrr, no m/r/g appreciated  ABDOMEN: bs+, soft, nt, nd, no appreciable masses  : No jay catheter, no CVA tenderness  EXTREMITIES:  radial pulse present b/l, PT present b/l, no pitting edema present  Neuro: Alert and follow commands, no paralysis of extremities appreciated  SKIN: warm and dry, no visible rashes

## 2019-12-17 NOTE — CONSULT NOTE ADULT - PROBLEM SELECTOR RECOMMENDATION 9
Pt recently admitted to [facility], monitored with serial cardiac enzymes which were negative. She also underwent medical stress test which did not show any arrhythmias, ST-T changes or wall motion abnormalities. Low suspicion for Pt recently admitted to [facility], monitored with serial cardiac enzymes which were negative. She also underwent medical stress test which did not show any arrhythmias, ST-T changes or wall motion abnormalities. Low suspicion for flow-limiting plaque causing pain.   -Continue telemetry monitoring to capture any arrhythmic events  -Continue ASA, Lipitor, amlodipine, lisinopril    INCOMPLETE NOTE Pt recently admitted to [facility], monitored with serial cardiac enzymes which were negative. She also underwent medical stress test which did not show any arrhythmias, ST-T changes or wall motion abnormalities. Low suspicion for flow-limiting plaque causing pain.   -Cardiac enzymes negative in ED. EKG without acute ST changes.  -Continue telemetry monitoring to capture any arrhythmic events  -Continue ASA, Lipitor, amlodipine, lisinopril    INCOMPLETE NOTE Pt recently admitted to [facility], monitored with serial cardiac enzymes which were negative. She also underwent medical stress test which did not show any arrhythmias, ST-T changes or wall motion abnormalities. Low suspicion for flow-limiting plaque causing pain.   -Cardiac enzymes negative in ED. EKG without acute ST changes.  -Continue telemetry monitoring to capture any arrhythmic events  -Continue ASA, Lipitor, amlodipine, lisinopril  -Given diabetes, would consider increasing dose of amlodipine given BP still >130/80 Pt recently admitted to [facility], monitored with serial cardiac enzymes which were negative. She also underwent medical stress test which did not show any arrhythmias, ST-T changes or wall motion abnormalities. Low suspicion for flow-limiting plaque causing pain. Pt does have significant cardiac risk factors including poorly controlled T2DM, obesity and HTN.  -Cardiac enzymes negative in ED. EKG without acute ST changes.  -Continue telemetry monitoring to capture any arrhythmic events  -Continue ASA, Lipitor, amlodipine, lisinopril  -Given diabetes, would consider increasing dose of amlodipine given BP still >130/80 Pt does have significant cardiac risk factors including poorly controlled T2DM, obesity and HTN. Pt recently admitted to [facility], monitored with serial cardiac enzymes which were negative. She also underwent medical stress test which did not show any arrhythmias, ST-T changes, active chest pain or wall motion abnormalities. Low suspicion for flow-limiting plaque causing pain.   -Cardiac enzymes downtrending <6-->17-->14. EKG without acute ST changes.  -Continue telemetry monitoring to capture any arrhythmic events  -Continue ASA, Lipitor, amlodipine, lisinopril  -Given diabetes, would consider increasing dose of amlodipine given BP still >130/80    INCOMPLETE NOTE Pt does have significant cardiac risk factors including poorly controlled T2DM, obesity and HTN. Pt recently admitted to [facility], monitored with serial cardiac enzymes which were negative. She also underwent medical stress test which did not show any arrhythmias, ST-T changes, active chest pain or wall motion abnormalities. Low suspicion for flow-limiting plaque causing pain.   -Cardiac enzymes downtrending <6-->17-->14. EKG without acute ST changes.  -Continue telemetry monitoring to capture any arrhythmic events. When pt experienced chest pain earlier this AM, no changes in rhythm noted on telemetry.  -Continue ASA, Lipitor, amlodipine, lisinopril  -Given diabetes, would consider increasing dose of amlodipine given BP still >130/80    INCOMPLETE NOTE Pt does have significant cardiac risk factors including poorly controlled T2DM, obesity and HTN. Pt recently admitted to [facility], monitored with serial cardiac enzymes which were negative. She also underwent medical stress test which did not show any arrhythmias, ST-T changes, active chest pain or wall motion abnormalities. Low suspicion for flow-limiting plaque causing pain.   -Cardiac enzymes downtrending <6-->17-->14. EKG without acute ST changes.  -Continue telemetry monitoring to capture any arrhythmic events. When pt experienced chest pain earlier this AM, no changes in rhythm noted on telemetry.  -Continue ASA, Lipitor, amlodipine, lisinopril  -Given diabetes, would consider increasing dose of amlodipine given BP still >130/80    Attending attestation to follow. Pt does have significant cardiac risk factors including poorly controlled T2DM, obesity and HTN. Pt recently admitted to [facility], monitored with serial cardiac enzymes which were negative. She also underwent medical stress test which did not show any arrhythmias, ST-T changes, active chest pain or wall motion abnormalities. Low suspicion for flow-limiting plaque causing ischemic pain.   -Cardiac enzymes downtrending <6-->17-->14. CKMB 2.2, WNL. EKG without acute ST changes.   -Continue telemetry monitoring to capture any arrhythmic events. When pt experienced chest pain earlier this AM, no changes in rhythm noted on telemetry.  -Continue ASA, Lipitor, amlodipine, lisinopril  -Given diabetes, would consider increasing dose of amlodipine given BP still >130/80    Attending attestation to follow. Pt does have significant cardiac risk factors including poorly controlled T2DM, obesity and HTN. Pt recently admitted to [facility], monitored with serial cardiac enzymes which were negative. She also underwent medical stress test which did not show any arrhythmias, ST-T changes, active chest pain or wall motion abnormalities.   -Cardiac enzymes downtrending <6-->17-->14. CKMB 2.2, WNL. EKG without acute ST changes. Lower suspicion for flow-limiting plaque causing ischemic pain.  -Continue telemetry monitoring to capture any arrhythmic events. When pt experienced chest pain earlier this AM, no changes in rhythm noted on telemetry.  -Continue ASA, Lipitor, amlodipine, lisinopril  -Given diabetes, would consider increasing dose of amlodipine given BP still >130/80    Attending attestation to follow. Pt does have significant cardiac risk factors including poorly controlled T2DM, obesity and HTN. Pt recently admitted to [facility], monitored with serial cardiac enzymes which were negative. She also underwent medical stress test which did not show any arrhythmias, ST-T changes, active chest pain or wall motion abnormalities.   -Cardiac enzymes downtrending <6-->17-->14. CKMB 2.2, WNL. EKG without acute ST changes. Lower suspicion for flow-limiting plaque causing ischemic pain. Pain seems localizable, sometimes lasting <60-90 seconds.  -Continue telemetry monitoring to capture any arrhythmic events. When pt experienced chest pain earlier this AM, no changes in rhythm noted on telemetry.  -Continue ASA, Lipitor, amlodipine, lisinopril  -Given diabetes, would consider increasing dose of amlodipine if BP continues to be >130/80    Attending attestation to follow.

## 2019-12-17 NOTE — ED ADULT NURSE REASSESSMENT NOTE - NS ED NURSE REASSESS COMMENT FT1
pt with mg 1.5 additional magnesium in progress via infusion pump pt pending tele bed assignment no chest pain at this time

## 2019-12-17 NOTE — ED ADULT NURSE REASSESSMENT NOTE - NS ED NURSE REASSESS COMMENT FT1
pt resting comfortably.  Steady gait noted when pt walked to bathroom.  VSS.  Pt states that she has intermittent left sided chest pain that has currently resolved.  safety and comfort maintained.  Will continue ot monitor.  Pending bed upstairs.

## 2019-12-17 NOTE — H&P ADULT - PROBLEM SELECTOR PLAN 1
- although recently completed w/u at [facility] w/ TTE and NM stress not c/w MI, patient continues to have atypical chest pain improving with aspirin and with increased risk factor of uncontrolled DM2 (HbA1c >12), family hx of MI in sister and mother, and hx of nonadherence with HTN meds  - tele monitoring, c/w trop trend, cardiology consult for eval (may require cardiac cath)  - patient completed above asa load on day of presentation, c/w ASA 81d and statin

## 2019-12-17 NOTE — H&P ADULT - NSHPSOCIALHISTORY_GEN_ALL_CORE
never smoker, social alcohol, no drugs. occupational exposure at [employer]. active [employer]
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
## 2019-12-17 NOTE — H&P ADULT - NSHPLABSRESULTS_GEN_ALL_CORE
Personally reviewed available labs, imaging and ekg  CBC Full  -  ( 16 Dec 2019 23:19 )  WBC Count : 11.36 K/uL  RBC Count : 4.87 M/uL  Hemoglobin : 13.5 g/dL  Hematocrit : 42.6 %  Platelet Count - Automated : 253 K/uL  Mean Cell Volume : 87.5 fl  Mean Cell Hemoglobin : 27.7 pg  Mean Cell Hemoglobin Concentration : 31.7 gm/dL  Auto Neutrophil # : 6.65 K/uL  Auto Lymphocyte # : 3.79 K/uL  Auto Monocyte # : 0.73 K/uL  Auto Eosinophil # : 0.05 K/uL  Auto Basophil # : 0.08 K/uL  Auto Neutrophil % : 58.6 %  Auto Lymphocyte % : 33.4 %  Auto Monocyte % : 6.4 %  Auto Eosinophil % : 0.4 %  Auto Basophil % : 0.7 %  138  |  101  |  10  ----------------------------<  143<H>  3.8   |  24  |  0.60  Ca    10.0      16 Dec 2019 23:19  Phos  3.1     12-16  Mg     1.2     12-16  TPro  8.6<H>  /  Alb  4.3  /  TBili  0.2  /  DBili  x   /  AST  19  /  ALT  33  /  AlkPhos  114  12-16  PT/INR - ( 16 Dec 2019 23:19 )   PT: 11.1 sec;   INR: 0.97 ratio    PTT - ( 16 Dec 2019 23:19 )  PTT:32.3 sec  CARDIAC MARKERS ( 16 Dec 2019 23:19 )  x     / x     / 80 U/L / x     / 2.2 ng/mL  Troponin T, High Sensitivity Result: <6:  (12.16.19 @ 23:19)  Troponin T, High Sensitivity Result: 17: (12.17.19 @ 01:23)  Imaging  CXR does not demonstrate lobar airspace opacification  < from: CT Angio Chest w/ IV Cont (12.04.19 @ 03:19) >  FINDINGS:  There is no obvious central pulmonary embolus, however evaluation for   pulmonary embolus is significantly limited due to suboptimal pulmonary   arterial enhancement.  The trachea and main bronchi are patent. 7 mm right apical lung nodule   (4-63). There are no pleural effusions. There is no pneumothorax.  The heart size is normal. There is no pericardial effusion. The thoracic   aorta is normal caliber without aneurysm or dissection.  Heterogeneously enlarged thyroid gland. There is no axillary, mediastinal   or hilar lymphadenopathy.   There is nonspecific thickening of the left adrenal gland, similar when   compared to 2016. The visualized upper abdominal organs are otherwise   unremarkable.  The visualized bones do not demonstrate abnormality.   < end of copied text >  < from: NM Stress Test, Dual Isotope (12.04.19 @ 13:34) >    FINDINGS: The technical quality of the resting and post-stress perfusion   images was good.  Review of resting and post-stress myocardial   scintigrams revealed normal left ventricular perfusion. There was no   evidence of reversible or fixed perfusion defects.    Gated wall motion study revealed normal left ventricular contractility.   There were noregional wall motion abnormalities or regions of decreased   wall thickening. There was no paradoxical septal motion.     Calculated left ventricular ejection fraction post-stress was 72%, based   on a left ventricular end diastolic volume of 86 mL and an end systolic   volume of 24 mL. Stroke volume was 62 mL.     IMPRESSION: Normal SPECT Myocardial Perfusion Imaging.     Normal left ventricular function with an ejection fraction of 72%   (Normal: 50% or greater).    No regional wall motion abnormalities.    No evidence of reversible or fixed perfusion defects.    < end of copied text >    EKG: sinus 88bpm QTc<500ms no ST elevation c/w STEMI

## 2019-12-17 NOTE — CONSULT NOTE ADULT - ATTENDING COMMENTS
49 year old woman with morbid obesity, very poorly controlled diabetes, having recurrent left chest pains which were evaluated with non-invasive testing at [facility] without indications of ischemia. Cardiac troponin consistently normal. Pain is fleeting, left upper lateral chest/breast. Can come and go in a second or two and has had multiple times today. At one point last evening felt palpitations, as though heart jumping out of chest, but terminated quickly, spontaneously. No arrhythmias observed in ER. At this time symptoms seem most uncharacteristic of angina and appropriate evaluation has been done. Would focus management at control of diabetes and optimizing medications to modify cardiac risk.

## 2019-12-17 NOTE — CONSULT NOTE ADULT - ASSESSMENT
48 yo female with GDM x 2 (25 and 28 years ago) and T2DM x 16 years uncontrolled (HbA1c 12.3%) with neuropathy of toes here with 1 day of L-sided shooting CP associated with sob and palpitations, endocrine consulted for uncontrolled DM.

## 2019-12-17 NOTE — ED ADULT NURSE REASSESSMENT NOTE - NS ED NURSE REASSESS COMMENT FT1
0107- patient complained of recurrence of mild sharp left CW pain which lasted about 2 mins. Denies lightheadedness, sob. Pat RUIZ made aware. Repeat trop level sent. [facility] room confirmed no changes in patients rhythm. NSR

## 2019-12-17 NOTE — PROGRESS NOTE ADULT - SUBJECTIVE AND OBJECTIVE BOX
Patient is a 49y old  Female who presents with a chief complaint of 49F w/ chest pain (17 Dec 2019 03:38)      SUBJECTIVE / OVERNIGHT EVENTS:    49F w/ uncontrolled DM2 (A1c12.3), HTN, MVP, obesity, multinodular thyroid, reactive airway disease (WTC), and recent admit to [facility] for chest pain (d/c 12/6/19) presents to [facility] for evaluation of chest pain.  She took ASA 81 chewable x5 at approx 7pm when she had the chest pain and felt the way people have described NSVT to her and became concerned that the patti had some alleviation with aspirin however she felt impending doom which led her to drive from work to the hospital.        ADDITIONAL REVIEW OF SYSTEMS: Negative except for above    MEDICATIONS  (STANDING):  amLODIPine   Tablet 5 milliGRAM(s) Oral daily  aspirin enteric coated 81 milliGRAM(s) Oral daily  atorvastatin 40 milliGRAM(s) Oral at bedtime  budesonide 160 MICROgram(s)/formoterol 4.5 MICROgram(s) Inhaler 2 Puff(s) Inhalation two times a day  dextrose 5%. 1000 milliLiter(s) (50 mL/Hr) IV Continuous <Continuous>  dextrose 50% Injectable 12.5 Gram(s) IV Push once  dextrose 50% Injectable 25 Gram(s) IV Push once  dextrose 50% Injectable 25 Gram(s) IV Push once  enoxaparin Injectable 40 milliGRAM(s) SubCutaneous daily  insulin lispro (HumaLOG) corrective regimen sliding scale   SubCutaneous three times a day before meals  insulin lispro (HumaLOG) corrective regimen sliding scale   SubCutaneous at bedtime  lisinopril 20 milliGRAM(s) Oral two times a day    MEDICATIONS  (PRN):  ALBUTerol    90 MICROgram(s) HFA Inhaler 2 Puff(s) Inhalation every 6 hours PRN Shortness of Breath and/or Wheezing  dextrose 40% Gel 15 Gram(s) Oral once PRN Blood Glucose LESS THAN 70 milliGRAM(s)/deciliter  glucagon  Injectable 1 milliGRAM(s) IntraMuscular once PRN Glucose LESS THAN 70 milligrams/deciliter      CAPILLARY BLOOD GLUCOSE      POCT Blood Glucose.: 124 mg/dL (17 Dec 2019 08:35)    I&O's Summary      PHYSICAL EXAM:  Vital Signs Last 24 Hrs  T(C): 37.1 (17 Dec 2019 10:45), Max: 37.1 (17 Dec 2019 10:45)  T(F): 98.7 (17 Dec 2019 10:45), Max: 98.7 (17 Dec 2019 10:45)  HR: 83 (17 Dec 2019 10:45) (78 - 83)  BP: 154/91 (17 Dec 2019 10:45) (126/85 - 170/83)  BP(mean): --  RR: 17 (17 Dec 2019 10:45) (15 - 18)  SpO2: 98% (17 Dec 2019 10:45) (96% - 100%)    PHYSICAL EXAM:  GENERAL: NAD, well-developed  HEAD:  Atraumatic, Normocephalic  EYES:  conjunctiva and sclera clear  NECK: Supple, No JVD  CHEST/LUNG: Clear to auscultation bilaterally; No wheeze  HEART: Regular rate and rhythm; No murmurs, rubs, or gallops  ABDOMEN: Soft, Nontender, Nondistended; Bowel sounds present  EXTREMITIES:  2+ Peripheral Pulses, No clubbing, cyanosis, or edema  PSYCH: AAOx3  NEUROLOGY: non-focal  SKIN: No rashes or lesions      LABS:                        11.4   9.93  )-----------( 217      ( 17 Dec 2019 06:46 )             36.0     12-17    139  |  103  |  8   ----------------------------<  136<H>  3.5   |  26  |  0.57    Ca    9.0      17 Dec 2019 06:46  Phos  3.5     12-17  Mg     1.5     12-17    TPro  8.6<H>  /  Alb  4.3  /  TBili  0.2  /  DBili  x   /  AST  19  /  ALT  33  /  AlkPhos  114  12-16    PT/INR - ( 16 Dec 2019 23:19 )   PT: 11.1 sec;   INR: 0.97 ratio         PTT - ( 16 Dec 2019 23:19 )  PTT:32.3 sec  CARDIAC MARKERS ( 16 Dec 2019 23:19 )  x     / x     / 80 U/L / x     / 2.2 ng/mL            RADIOLOGY & ADDITIONAL TESTS:    Imaging Personally Reviewed:    Electrocardiogram Personally Reviewed:    COORDINATION OF CARE:  Care Discussed with Consultants/Other Providers [Y/N]:  Prior or Outpatient Records Reviewed [Y/N]: Patient is a 49y old  Female who presents with a chief complaint of 49F w/ chest pain (17 Dec 2019 03:38)      SUBJECTIVE / OVERNIGHT EVENTS:    49F w/ uncontrolled DM2 (A1c12.3), HTN, MVP, obesity, multinodular thyroid, reactive airway disease (WTC), and recent admit to [facility] for chest pain (d/c 12/6/19) presents to [facility] for evaluation of chest pain.  She took ASA 81 chewable x5 at approx 7pm when she had the chest pain and felt the way people have described NSVT to her and became concerned that the patti had some alleviation with aspirin however she felt impending doom which led her to drive from work to the hospital.  Of note, patient c/o RLE calf pain.        ADDITIONAL REVIEW OF SYSTEMS: Negative except for above    MEDICATIONS  (STANDING):  amLODIPine   Tablet 5 milliGRAM(s) Oral daily  aspirin enteric coated 81 milliGRAM(s) Oral daily  atorvastatin 40 milliGRAM(s) Oral at bedtime  budesonide 160 MICROgram(s)/formoterol 4.5 MICROgram(s) Inhaler 2 Puff(s) Inhalation two times a day  dextrose 5%. 1000 milliLiter(s) (50 mL/Hr) IV Continuous <Continuous>  dextrose 50% Injectable 12.5 Gram(s) IV Push once  dextrose 50% Injectable 25 Gram(s) IV Push once  dextrose 50% Injectable 25 Gram(s) IV Push once  enoxaparin Injectable 40 milliGRAM(s) SubCutaneous daily  insulin lispro (HumaLOG) corrective regimen sliding scale   SubCutaneous three times a day before meals  insulin lispro (HumaLOG) corrective regimen sliding scale   SubCutaneous at bedtime  lisinopril 20 milliGRAM(s) Oral two times a day    MEDICATIONS  (PRN):  ALBUTerol    90 MICROgram(s) HFA Inhaler 2 Puff(s) Inhalation every 6 hours PRN Shortness of Breath and/or Wheezing  dextrose 40% Gel 15 Gram(s) Oral once PRN Blood Glucose LESS THAN 70 milliGRAM(s)/deciliter  glucagon  Injectable 1 milliGRAM(s) IntraMuscular once PRN Glucose LESS THAN 70 milligrams/deciliter      CAPILLARY BLOOD GLUCOSE      POCT Blood Glucose.: 124 mg/dL (17 Dec 2019 08:35)    I&O's Summary      PHYSICAL EXAM:  Vital Signs Last 24 Hrs  T(C): 37.1 (17 Dec 2019 10:45), Max: 37.1 (17 Dec 2019 10:45)  T(F): 98.7 (17 Dec 2019 10:45), Max: 98.7 (17 Dec 2019 10:45)  HR: 83 (17 Dec 2019 10:45) (78 - 83)  BP: 154/91 (17 Dec 2019 10:45) (126/85 - 170/83)  BP(mean): --  RR: 17 (17 Dec 2019 10:45) (15 - 18)  SpO2: 98% (17 Dec 2019 10:45) (96% - 100%)    PHYSICAL EXAM:  GENERAL: NAD, well-developed  HEAD:  Atraumatic, Normocephalic  EYES:  conjunctiva and sclera clear  NECK: Supple, No JVD  CHEST/LUNG: Clear to auscultation bilaterally; No wheeze  HEART: Regular rate and rhythm; No murmurs, rubs, or gallops  ABDOMEN: Soft, Nontender, Nondistended; Bowel sounds present  EXTREMITIES:  2+ Peripheral Pulses, No clubbing, cyanosis, or edema, pos varicose veins noted on the RLE   PSYCH: AAOx3  NEUROLOGY: non-focal        LABS:                        11.4   9.93  )-----------( 217      ( 17 Dec 2019 06:46 )             36.0     12-17    139  |  103  |  8   ----------------------------<  136<H>  3.5   |  26  |  0.57    Ca    9.0      17 Dec 2019 06:46  Phos  3.5     12-17  Mg     1.5     12-17    TPro  8.6<H>  /  Alb  4.3  /  TBili  0.2  /  DBili  x   /  AST  19  /  ALT  33  /  AlkPhos  114  12-16    PT/INR - ( 16 Dec 2019 23:19 )   PT: 11.1 sec;   INR: 0.97 ratio         PTT - ( 16 Dec 2019 23:19 )  PTT:32.3 sec  CARDIAC MARKERS ( 16 Dec 2019 23:19 )  x     / x     / 80 U/L / x     / 2.2 ng/mL            RADIOLOGY & ADDITIONAL TESTS:    Imaging Personally Reviewed:    Electrocardiogram Personally Reviewed:    COORDINATION OF CARE:  Care Discussed with Consultants/Other Providers [Y/N]:  Prior or Outpatient Records Reviewed [Y/N]:
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
## 2019-12-17 NOTE — CONSULT NOTE ADULT - ASSESSMENT
Pt is a 49 woman with PMHx poorly controlled T2DM (A1c 12.3%), mitral valve prolapse, obesity, multinodular thyroid, asthma presenting after experiencing palpitations and recurrent L-sided atypical chest/breast pain, admitted for further workup of pain.

## 2019-12-17 NOTE — PROGRESS NOTE ADULT - ATTENDING COMMENTS
F/up Lower ext Doppler  : if neg / if no arrythmia of Tele and if cardio has no plan for any further work up , she might be dc in AM     [facility]   [phone]

## 2019-12-17 NOTE — H&P ADULT - NSHPREVIEWOFSYSTEMS_GEN_ALL_CORE
CONSTITUTIONAL: No fever, no chills  EYES: No eye pain, no visual disturbance  Mouth: no pain in mouth, no cuts  RESPIRATORY: No cough, No sob  CARDIOVASCULAR: CP+, no palpitations  GASTROINTESTINAL: no abdominal pain, no n/v/d (loose stool with metformin reported)  GENITOURINARY: No dysuria, no hematuria  Heme: No easy bruising, no swelling appreciated in neck/armpit/groin  NEUROLOGICAL: No headaches, no weakness  SKIN: No itching, no rashes  MUSCULOSKELETAL: No joint pain, no joint swelling

## 2019-12-17 NOTE — CONSULT NOTE ADULT - NSHPATTENDINGPLANDISCUSS_GEN_ALL_CORE
ELIZA Cordero, Patient as well as her daughter and niece
Call Cardiology Fellow or Attending as listed on [email or web address] password: [email or web address].

## 2019-12-17 NOTE — H&P ADULT - HISTORY OF PRESENT ILLNESS
49F w/ uncontrolled DM2 (A1c12.3), HTN, MVP, obesity, multinodular thyroid, reactive airway disease (WTC), and recent admit to [facility] for chest pain (d/c 12/6/19) presents to [facility] for evaluation of chest pain. Patient reports that since discharge from [facility] she has continued to have intermittent "zing" shooting pain over left chest and left breast, w/ numbness of left shoulder and left arm, associated sob, occurring at rest, and improving with aspirin. She took ASA 81 chewable x5 at approx 7pm when she had the chest pain and felt the way people have described NSVT to her and became concerned that the patti had some alleviation with aspirin however she felt impending doom which led her to drive from work to the hospital. In her family, her sister and mother at young age are reported to have MI. She reports poor adherence to medication over the last year. Of note w/u as [facility] neg for PE on CTPA, did not identify abnormal NM stress c/w MI, and did not identify significant structural abnormalities on TTE. There was consideration to pursue cardiac catheterization however was deferred.    In the ED, VS 98.3, 170/83, 78, 16 100%RA  s/p 1L NS 49F w/ uncontrolled DM2 (A1c12.3), HTN, MVP, obesity, multinodular thyroid, reactive airway disease (WTC), and recent admit to [facility] for chest pain (d/c 12/6/19) presents to [facility] for evaluation of chest pain. Patient reports that since discharge from [facility] she has continued to have intermittent "zing" shooting pain over left chest and left breast, w/ numbness of left shoulder and left arm, associated sob, occurring at rest, and improving with aspirin. She took ASA 81 chewable x5 at approx 7pm when she had the chest pain and felt the way people have described NSVT to her and became concerned that the patti had some alleviation with aspirin however she felt impending doom which led her to drive from work to the hospital. In her family, her sister and mother at young age are reported to have MI. She reports poor adherence to medication over the last year. Of note w/u as [facility] neg for PE on CTPA, did not identify abnormal NM stress c/w MI, and did not identify significant structural abnormalities on TTE. There was consideration to pursue cardiac catheterization however was deferred. Last multinodular thyroid identified incidentally on last admit w/ TFTs wnl w/ planned outpatient w/u.    In the ED, VS 98.3, 170/83, 78, 16 100%RA  s/p 1L NS

## 2019-12-17 NOTE — CONSULT NOTE ADULT - PROBLEM SELECTOR RECOMMENDATION 9
-BS Goal 100-180: control likely due to the effect of her oral agents.   -Weight based basal/bolus: Lantus 24 units sq qhs and humalog 8 units tid-ac  -Moderate scale tid-ac/qhs  -RD consult  -urine pregnancy test  DISPO: Has f/u with Dr. Eastman on 2/3  Metformin and GLP-1 instead of CAMP as patient needs cardio-protection and weight loss. She had urinary issues with invokana so would not use a SGLT2i.

## 2019-12-18 ENCOUNTER — TRANSCRIPTION ENCOUNTER (OUTPATIENT)
Age: 49
End: 2019-12-18

## 2019-12-18 LAB
ALBUMIN SERPL ELPH-MCNC: 3.8 G/DL — SIGNIFICANT CHANGE UP (ref 3.3–5)
ALP SERPL-CCNC: 99 U/L — SIGNIFICANT CHANGE UP (ref 40–120)
ALT FLD-CCNC: 31 U/L — SIGNIFICANT CHANGE UP (ref 10–45)
ANION GAP SERPL CALC-SCNC: 10 MMOL/L — SIGNIFICANT CHANGE UP (ref 5–17)
AST SERPL-CCNC: 21 U/L — SIGNIFICANT CHANGE UP (ref 10–40)
BILIRUB SERPL-MCNC: 0.3 MG/DL — SIGNIFICANT CHANGE UP (ref 0.2–1.2)
BUN SERPL-MCNC: 12 MG/DL — SIGNIFICANT CHANGE UP (ref 7–23)
CALCIUM SERPL-MCNC: 9.5 MG/DL — SIGNIFICANT CHANGE UP (ref 8.4–10.5)
CHLORIDE SERPL-SCNC: 102 MMOL/L — SIGNIFICANT CHANGE UP (ref 96–108)
CO2 SERPL-SCNC: 27 MMOL/L — SIGNIFICANT CHANGE UP (ref 22–31)
CREAT SERPL-MCNC: 0.6 MG/DL — SIGNIFICANT CHANGE UP (ref 0.5–1.3)
ERYTHROCYTE [SEDIMENTATION RATE] IN BLOOD: 25 MM/HR — HIGH (ref 0–20)
GLUCOSE BLDC GLUCOMTR-MCNC: 105 MG/DL — HIGH (ref 70–99)
GLUCOSE BLDC GLUCOMTR-MCNC: 129 MG/DL — HIGH (ref 70–99)
GLUCOSE BLDC GLUCOMTR-MCNC: 136 MG/DL — HIGH (ref 70–99)
GLUCOSE BLDC GLUCOMTR-MCNC: 139 MG/DL — HIGH (ref 70–99)
GLUCOSE SERPL-MCNC: 131 MG/DL — HIGH (ref 70–99)
HCT VFR BLD CALC: 39.9 % — SIGNIFICANT CHANGE UP (ref 34.5–45)
HGB BLD-MCNC: 12.4 G/DL — SIGNIFICANT CHANGE UP (ref 11.5–15.5)
MAGNESIUM SERPL-MCNC: 1.6 MG/DL — SIGNIFICANT CHANGE UP (ref 1.6–2.6)
MCHC RBC-ENTMCNC: 27.5 PG — SIGNIFICANT CHANGE UP (ref 27–34)
MCHC RBC-ENTMCNC: 31.1 GM/DL — LOW (ref 32–36)
MCV RBC AUTO: 88.5 FL — SIGNIFICANT CHANGE UP (ref 80–100)
NRBC # BLD: 0 /100 WBCS — SIGNIFICANT CHANGE UP (ref 0–0)
PLATELET # BLD AUTO: 240 K/UL — SIGNIFICANT CHANGE UP (ref 150–400)
POTASSIUM SERPL-MCNC: 4.1 MMOL/L — SIGNIFICANT CHANGE UP (ref 3.5–5.3)
POTASSIUM SERPL-SCNC: 4.1 MMOL/L — SIGNIFICANT CHANGE UP (ref 3.5–5.3)
PROT SERPL-MCNC: 7.5 G/DL — SIGNIFICANT CHANGE UP (ref 6–8.3)
RBC # BLD: 4.51 M/UL — SIGNIFICANT CHANGE UP (ref 3.8–5.2)
RBC # FLD: 13.3 % — SIGNIFICANT CHANGE UP (ref 10.3–14.5)
SODIUM SERPL-SCNC: 139 MMOL/L — SIGNIFICANT CHANGE UP (ref 135–145)
WBC # BLD: 10.34 K/UL — SIGNIFICANT CHANGE UP (ref 3.8–10.5)
WBC # FLD AUTO: 10.34 K/UL — SIGNIFICANT CHANGE UP (ref 3.8–10.5)

## 2019-12-18 PROCEDURE — 99233 SBSQ HOSP IP/OBS HIGH 50: CPT

## 2019-12-18 PROCEDURE — 12345: CPT | Mod: NC

## 2019-12-18 PROCEDURE — 93970 EXTREMITY STUDY: CPT | Mod: 26

## 2019-12-18 RX ADMIN — LISINOPRIL 20 MILLIGRAM(S): 2.5 TABLET ORAL at 06:09

## 2019-12-18 RX ADMIN — AMLODIPINE BESYLATE 10 MILLIGRAM(S): 2.5 TABLET ORAL at 06:09

## 2019-12-18 RX ADMIN — BUDESONIDE AND FORMOTEROL FUMARATE DIHYDRATE 2 PUFF(S): 160; 4.5 AEROSOL RESPIRATORY (INHALATION) at 06:09

## 2019-12-18 RX ADMIN — Medication 8 UNIT(S): at 08:45

## 2019-12-18 RX ADMIN — Medication 81 MILLIGRAM(S): at 12:56

## 2019-12-18 RX ADMIN — INSULIN GLARGINE 24 UNIT(S): 100 INJECTION, SOLUTION SUBCUTANEOUS at 22:24

## 2019-12-18 RX ADMIN — Medication 8 UNIT(S): at 17:26

## 2019-12-18 RX ADMIN — ATORVASTATIN CALCIUM 40 MILLIGRAM(S): 80 TABLET, FILM COATED ORAL at 22:24

## 2019-12-18 RX ADMIN — Medication 8 UNIT(S): at 12:55

## 2019-12-18 RX ADMIN — BUDESONIDE AND FORMOTEROL FUMARATE DIHYDRATE 2 PUFF(S): 160; 4.5 AEROSOL RESPIRATORY (INHALATION) at 17:57

## 2019-12-18 RX ADMIN — LISINOPRIL 20 MILLIGRAM(S): 2.5 TABLET ORAL at 17:57

## 2019-12-18 NOTE — PROGRESS NOTE ADULT - PROBLEM SELECTOR PLAN 10
Transitions of Care Status:  1.  Name of PCP: Dr. Cass Gonzalez  2.  PCP Contacted on Admission: [ ] Y    [ ] N    3.  PCP contacted at Discharge: [ ] Y    [ ] N    [ ] N/A  4.  Post-Discharge Appointment Date and Location:  5.  Summary of Handoff given to PCP:
Transitions of Care Status:  1.  Name of PCP: Dr. Cass Gonzalez  2.  PCP Contacted on Admission: [ ] Y    [ ] N    3.  PCP contacted at Discharge: [ ] Y    [ ] N    [ ] N/A  4.  Post-Discharge Appointment Date and Location:  5.  Summary of Handoff given to PCP:

## 2019-12-18 NOTE — DIETITIAN INITIAL EVALUATION ADULT. - ADD RECOMMEND
1) Continue current diet as tolerated. 2) Reinforce diet education as needed; RD remains available. 3) Monitor PO intake, diet tolerance, weight trends, labs, and skin integrity. 1) Continue current diet as tolerated. 2) Reinforce diet education as needed; RD remains available. 3) Monitor PO intake, diet tolerance, weight trends, labs, and skin integrity. 4) BMI >40 sticker placed - spoke to 1) Continue current diet as tolerated. 2) Reinforce diet education as needed; RD remains available. 3) Monitor PO intake, diet tolerance, weight trends, labs, and skin integrity. 4) BMI >40 sticker placed - spoke to NP.

## 2019-12-18 NOTE — DIETITIAN INITIAL EVALUATION ADULT. - REASON INDICATOR FOR ASSESSMENT
Pt seen for nutrition consult.  Source: Comprehensive chart review and patient report  Per Chart: 49F w/ uncontrolled DM2 (A1c12.3), HTN, MVP, obesity, multinodular thyroid, reactive airway disease (WTC), and recent admit to [facility] for chest pain (d/c 12/6/19) presents to [facility] for evaluation of atypical chest pain.

## 2019-12-18 NOTE — DIETITIAN INITIAL EVALUATION ADULT. - OTHER INFO
Visited pt at bedside.................. Visited pt at bedside. Pt reports having a good appetite both PTA and in-house. Pt states that she had a very unhealthy diet due to work and stress for a while, but has been trying to eat healthier in the past 3 weeks. States that she tries to limit simple sugars, and attempts to monitor her fat intake. Pt denies any known food allergies or intolerances. Pt denies any current chewing/swallowing difficulty, self-feeding difficulty, nausea/vomiting, constipation, or diarrhea.     Patient reports that she has not received much DM diet education in the past, so she tries to look up information herself. Pt aware that her A1c levels are very elevated. Provided extensive education on Carbohydrate Consistent diet including sources of carbohydrates, portion sizes, pairing protein with carbohydrates, limiting sugar sweetened beverages in diet and the importance of consistent eating pattern to help optimize glycemic control. Patient also encouraged to monitor blood sugar levels every day. Educated pt on how to read nutrition labels to monitor carbohydrate intake. Encouraged pt to follow up with endocrinologist post discharge. Written material provided. RD remains available. Visited pt at bedside. Pt reports having a good appetite both PTA and in-house. Pt states that she had a very unhealthy diet due to work and stress for a while, but has been trying to eat healthier in the past 3 weeks. States that she tries to limit simple sugars, and attempts to monitor her fat intake. Pt denies any known food allergies or intolerances. Pt denies any current chewing/swallowing difficulty, self-feeding difficulty, nausea/vomiting, constipation, or diarrhea. Pt denies any noticeable recent weight changes.    Patient reports that she has not received much DM diet education in the past, so she tries to look up information herself. Pt aware that her A1c levels are very elevated. Provided extensive education on Carbohydrate Consistent diet including sources of carbohydrates, portion sizes, pairing protein with carbohydrates, limiting sugar sweetened beverages in diet and the importance of consistent eating pattern to help optimize glycemic control. Patient also encouraged to monitor blood sugar levels every day. Educated pt on how to read nutrition labels to monitor carbohydrate intake. Encouraged pt to follow up with endocrinologist post discharge. Written material provided. RD remains available.

## 2019-12-18 NOTE — PROGRESS NOTE ADULT - PROBLEM SELECTOR PROBLEM 8
Reactive airway disease without complication, unspecified asthma severity, unspecified whether persistent
Reactive airway disease without complication, unspecified asthma severity, unspecified whether persistent

## 2019-12-18 NOTE — PROGRESS NOTE ADULT - PROBLEM SELECTOR PLAN 5
Endocrine has seen patient: her outpatient endo has told her that he would assess it at her next appt 2/3/2020
Endocrine has seen patient  : her outpatient endo has told her that he would assess it at her next appt 2/3/20

## 2019-12-18 NOTE — PROGRESS NOTE ADULT - SUBJECTIVE AND OBJECTIVE BOX
[facility] Division of Hospital Medicine  Chidi Desouza MD, MICHELE  Pager (CITLALLI, 8M-5P): [phone]  Other Times:  [phone]    Patient is a 49y old  Female who presents with a chief complaint of chest pain (18 Dec 2019 13:21)    SUBJECTIVE / OVERNIGHT EVENTS:  No events overnight. The patient is requesting a second opinion cardiology to evaluate chest pain (currently resolved).     MEDICATIONS  (STANDING):  amLODIPine   Tablet 10 milliGRAM(s) Oral daily  aspirin enteric coated 81 milliGRAM(s) Oral daily  atorvastatin 40 milliGRAM(s) Oral at bedtime  budesonide 160 MICROgram(s)/formoterol 4.5 MICROgram(s) Inhaler 2 Puff(s) Inhalation two times a day  enoxaparin Injectable 40 milliGRAM(s) SubCutaneous daily  insulin glargine Injectable (LANTUS) 24 Unit(s) SubCutaneous at bedtime  insulin lispro (HumaLOG) corrective regimen sliding scale   SubCutaneous three times a day before meals  insulin lispro (HumaLOG) corrective regimen sliding scale   SubCutaneous at bedtime  insulin lispro Injectable (HumaLOG) 8 Unit(s) SubCutaneous three times a day before meals  lisinopril 20 milliGRAM(s) Oral two times a day    MEDICATIONS  (PRN):  ALBUTerol    90 MICROgram(s) HFA Inhaler 2 Puff(s) Inhalation every 6 hours PRN Shortness of Breath and/or Wheezing  dextrose 40% Gel 15 Gram(s) Oral once PRN Blood Glucose LESS THAN 70 milliGRAM(s)/deciliter  glucagon  Injectable 1 milliGRAM(s) IntraMuscular once PRN Glucose LESS THAN 70 milligrams/deciliter    CAPILLARY BLOOD GLUCOSE  POCT Blood Glucose.: 105 mg/dL (18 Dec 2019 16:58)  POCT Blood Glucose.: 136 mg/dL (18 Dec 2019 12:53)  POCT Blood Glucose.: 129 mg/dL (18 Dec 2019 08:09)  POCT Blood Glucose.: 136 mg/dL (17 Dec 2019 21:43)    I&O's Summary    17 Dec 2019 07:01  -  18 Dec 2019 07:00  --------------------------------------------------------  IN: 240 mL / OUT: 0 mL / NET: 240 mL    18 Dec 2019 07:01  -  18 Dec 2019 18:55  --------------------------------------------------------  IN: 240 mL / OUT: 0 mL / NET: 240 mL      PHYSICAL EXAM:  Vital Signs Last 24 Hrs  T(C): 36.4 (18 Dec 2019 17:52), Max: 37.2 (17 Dec 2019 20:40)  T(F): 97.6 (18 Dec 2019 17:52), Max: 99 (17 Dec 2019 20:40)  HR: 76 (18 Dec 2019 17:52) (76 - 83)  BP: 138/82 (18 Dec 2019 17:52) (110/60 - 138/82)  BP(mean): --  RR: 18 (18 Dec 2019 17:52) (18 - 18)  SpO2: 99% (18 Dec 2019 17:52) (98% - 99%)    CONSTITUTIONAL: NAD, obese  EYES: PERRLA; conjunctiva and sclera clear  NECK: Supple, no JVD  RESPIRATORY: Normal respiratory effort; lungs are clear to auscultation bilaterally  CARDIOVASCULAR: Regular rate and rhythm, normal S1 and S2, no murmur/rub/gallop; No lower extremity edema  ABDOMEN: normoactive bowel sounds, soft, nontender to palpation, no rebound/guarding; no distension   MUSCULOSKELETAL:  Normal gait; no clubbing or cyanosis of digits; no joint swelling or tenderness to palpation  PSYCH: A+O to person, place, and time; affect appropriate  NEUROLOGY: CN 2-12 are intact and symmetric; no gross sensory deficits   SKIN: No rashes; no palpable lesions    LABS:                        12.4   10.34 )-----------( 240      ( 18 Dec 2019 09:51 )             39.9     12-18    139  |  102  |  12  ----------------------------<  131<H>  4.1   |  27  |  0.60    Ca    9.5      18 Dec 2019 09:51  Phos  3.5     12-17  Mg     1.6     12-18    TPro  7.5  /  Alb  3.8  /  TBili  0.3  /  DBili  x   /  AST  21  /  ALT  31  /  AlkPhos  99  12-18    PT/INR - ( 16 Dec 2019 23:19 )   PT: 11.1 sec;   INR: 0.97 ratio         PTT - ( 16 Dec 2019 23:19 )  PTT:32.3 sec  CARDIAC MARKERS ( 16 Dec 2019 23:19 )  x     / x     / 80 U/L / x     / 2.2 ng/mL      RADIOLOGY & ADDITIONAL TESTS:    Lab Results Reviewed    Imaging Personally Reviewed: LE duplex negative for DVR    Telemetry reviewed: No telemetry events.       COORDINATION OF CARE:  Care Discussed with Consultants/Other Providers:  Dr. Nichole (cardiology/ EP) - re plan of care

## 2019-12-18 NOTE — DIETITIAN INITIAL EVALUATION ADULT. - PERTINENT LABORATORY DATA
12/18/19 - Glucose 131(H)  12/5/19 - A1C 12.3    CAPILLARY BLOOD GLUCOSE  POCT Blood Glucose.: 136 mg/dL (18 Dec 2019 12:53)  POCT Blood Glucose.: 129 mg/dL (18 Dec 2019 08:09)  POCT Blood Glucose.: 136 mg/dL (17 Dec 2019 21:43)  POCT Blood Glucose.: 192 mg/dL (17 Dec 2019 17:27)

## 2019-12-18 NOTE — DISCHARGE NOTE PROVIDER - HOSPITAL COURSE
49F w/ uncontrolled DM2 (A1c12.3), HTN, MVP, obesity, multinodular thyroid, reactive airway disease (WTC), and recent admit to [facility] for chest pain (d/c 12/6/19) presents to [facility] for evaluation of chest pain. Patient reports that since discharge from [facility] she has continued to have intermittent "zing" shooting pain over left chest and left breast, w/ numbness of left shoulder and left arm, associated sob, occurring at rest, and improving with aspirin. She took ASA 81 chewable x5 at approx 7pm when she had the chest pain and felt the way people have described NSVT to her and became concerned that the pain had some alleviation with aspirin however she felt impending doom which led her to drive from work to the hospital. In her family, her sister and mother at young age are reported to have MI. She reports poor adherence to medication over the last year. Of note w/u as [facility] neg for PE on CTPA, did not identify abnormal NM stress c/w MI, and did not identify significant structural abnormalities on TTE. There was consideration to pursue cardiac catheterization however was deferred. Last multinodular thyroid identified incidentally on last admit w/ TFTs wnl w/ planned outpatient w/u.    pt continued to have both exertional and non exertional chest pain associated with intermittent palpitation, no hx of syncope..    pt was told by other cardiologist to have cath.     pt with sig risk factor for cad, including strong fhx, DM, HTN, Hypercholesteremia obesity, with intermittent palpitation    will consider cardiac ct and check for calcium score    event monitor as out pt 49F w/ uncontrolled DM2 (A1c12.3), HTN, MVP, obesity, multinodular thyroid, reactive airway disease (WTC), and recent admit to [facility] for chest pain (d/c 12/6/19) presents to [facility] for evaluation of chest pain. Patient reports that since discharge from [facility] she has continued to have intermittent "zing" shooting pain over left chest and left breast, w/ numbness of left shoulder and left arm, associated sob, occurring at rest, and improving with aspirin. She took ASA 81 chewable x5 at approx 7pm when she had the chest pain and felt the way people have described NSVT to her and became concerned that the pain had some alleviation with aspirin however she felt impending doom which led her to drive from work to the hospital. In her family, her sister and mother at young age are reported to have MI. She reports poor adherence to medication over the last year. Of note w/u as [facility] neg for PE on CTPA, did not identify abnormal NM stress c/w MI, and did not identify significant structural abnormalities on TTE. There was consideration to pursue cardiac catheterization however was deferred. Last multinodular thyroid identified incidentally on last admit w/ TFTs wnl w/ planned outpatient w/u.    pt continued to have both exertional and non exertional chest pain associated with intermittent palpitation, no hx of syncope..    pt was told by other cardiologist to have cath.     pt with sig risk factor for cad, including strong fhx, DM, HTN, Hypercholesteremia obesity, with intermittent palpitation     cardiac ct to check for calcium score    event monitor as out pt 49F w/ uncontrolled DM2 (A1c12.3), HTN, MVP, obesity, multinodular thyroid, reactive airway disease (WTC), and recent admit to [facility] for chest pain (d/c 12/6/19) presents to [facility] for evaluation of chest pain. Patient reports that since discharge from [facility] she has continued to have intermittent "zing" shooting pain over left chest and left breast, w/ numbness of left shoulder and left arm, associated sob, occurring at rest, and improving with aspirin. She took ASA 81 chewable x5 at approx 7pm when she had the chest pain and felt the way people have described NSVT to her and became concerned that the pain had some alleviation with aspirin however she felt impending doom which led her to drive from work to the hospital. In her family, her sister and mother at young age are reported to have MI. She reports poor adherence to medication over the last year. Of note w/u as [facility] neg for PE on CTPA, did not identify abnormal NM stress c/w MI, and did not identify significant structural abnormalities on TTE. There was consideration to pursue cardiac catheterization however was deferred. Last multinodular thyroid identified incidentally on last admit w/ TFTs wnl w/ planned outpatient w/u.    pt continued to have both exertional and non exertional chest pain associated with intermittent palpitation, no hx of syncope..    pt was told by other cardiologist to have cath.     pt with sig risk factor for cad, including strong fhx, DM, HTN, Hypercholesteremia obesity, with intermittent palpitation     cardiac ct to check for calcium score - no acute findings    event monitor as out pt 49F w/ uncontrolled DM2 (A1c12.3), HTN, MVP, obesity, multinodular thyroid, reactive airway disease (WTC), and recent admit to [facility] for chest pain (d/c 12/6/19) presents to [facility] for evaluation of chest pain. Patient reports that since discharge from [facility] she has continued to have intermittent "zing" shooting pain over left chest and left breast, w/ numbness of left shoulder and left arm, associated sob, occurring at rest, and improving with aspirin. She took ASA 81 chewable x5 at approx 7pm when she had the chest pain and felt the way people have described NSVT to her and became concerned that the pain had some alleviation with aspirin however she felt impending doom which led her to drive from work to the hospital. In her family, her sister and mother at young age are reported to have MI. She reports poor adherence to medication over the last year. Of note w/u as [facility] neg for PE on CTPA, did not identify abnormal NM stress c/w MI, and did not identify significant structural abnormalities on TTE. There was consideration to pursue cardiac catheterization however was deferred. Last multinodular thyroid identified incidentally on last admit w/ TFTs wnl w/ planned outpatient w/u.    pt continued to have both exertional and non exertional chest pain associated with intermittent palpitation, no hx of syncope..    pt was told by other cardiologist to have cath.     pt with sig risk factor for cad, including strong fhx, DM, HTN, Hypercholesteremia obesity, with intermittent palpitation     cardiac ct to check for calcium score - no acute findings    event monitor as out pt         Discharge Diagnoses:    # Chest pain - suspect episode of arrhythmia (possibly SVT outside the hospital)    # uncontrolled DM2 (A1c12.3)    # Morbid obesity    # essential hypertension    # reactive airway disease ([facility]) 49F w/ uncontrolled DM2 (A1c12.3), HTN, MVP, obesity, multinodular thyroid, reactive airway disease (WTC), and recent admit to [facility] for chest pain (d/c 12/6/19) presents to [facility] for evaluation of chest pain. Patient reports that since discharge from [facility] she has continued to have intermittent "zing" shooting pain over left chest and left breast, w/ numbness of left shoulder and left arm, associated sob, occurring at rest, and improving with aspirin. She took ASA 81 chewable x5 at approx 7pm when she had the chest pain and felt the way people have described NSVT to her and became concerned that the pain had some alleviation with aspirin however she felt impending doom which led her to drive from work to the hospital. In her family, her sister and mother at young age are reported to have MI. She reports poor adherence to medication over the last year. Of note w/u as [facility] neg for PE on CTPA, did not identify abnormal NM stress c/w MI, and did not identify significant structural abnormalities on TTE. There was consideration to pursue cardiac catheterization however was deferred. Last multinodular thyroid identified incidentally on last admit w/ TFTs wnl w/ planned outpatient w/u.    pt continued to have both exertional and non exertional chest pain associated with intermittent palpitation, no hx of syncope..    pt was told by other cardiologist to have cath.     pt with sig risk factor for cad, including strong fhx, DM, HTN, Hypercholesteremia obesity, with intermittent palpitation     cardiac ct to check for calcium score - no acute findings    event monitor as out pt         Discharge Diagnoses:    # Chest pain - suspect episode of arrhythmia in setting of hypomagnesemia (possibly SVT outside the hospital)    # hypomagnesemia     # uncontrolled DM2 (A1c12.3)    # Morbid obesity    # essential hypertension    # reactive airway disease ([facility])

## 2019-12-18 NOTE — CHART NOTE - NSCHARTNOTEFT_GEN_A_CORE
Upon Nutritional Assessment by the Registered Dietitian your patient was determined to meet criteria / has evidence of the following diagnosis/diagnoses:          [ ]  Mild Protein Calorie Malnutrition        [ ]  Moderate Protein Calorie Malnutrition        [ ] Severe Protein Calorie Malnutrition        [ ] Unspecified Protein Calorie Malnutrition        [ ] Underweight / BMI <19        [x] Morbid Obesity / BMI > 40      Findings as based on:  [x] Comprehensive nutrition assessment   [ ] Nutrition Focused Physical Exam  [x] Other: BMI of 43.4 kg/m2       Nutrition Plan/Recommendations:    1) Continue current diet as tolerated.   2) Reinforce diet education as needed; RD remains available.   3) Monitor PO intake, diet tolerance, weight trends, labs, and skin integrity.       PROVIDER Section:     By signing this assessment you are acknowledging and agree with the diagnosis/diagnoses assigned by the Registered Dietitian    Comments:

## 2019-12-18 NOTE — PROGRESS NOTE ADULT - ASSESSMENT
49F w/ uncontrolled DM2 (A1c12.3), HTN, MVP, obesity, multinodular thyroid, reactive airway disease (WTC), and recent admit to [facility] for chest pain (d/c 12/6/19) presents to [facility] for evaluation of atypical chest pain
49F w/ uncontrolled DM2 (A1c12.3), HTN, MVP, obesity, multinodular thyroid, reactive airway disease (WTC), and recent admit to [facility] for chest pain (d/c 12/6/19) presents to [facility] for evaluation of atypical chest pain
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
## 2019-12-18 NOTE — DIETITIAN INITIAL EVALUATION ADULT. - PROBLEM SELECTOR PLAN 1
- although recently completed w/u at [facility] w/ TTE and NM stress not c/w MI, patient continues to have atypical chest pain improving with aspirin and with increased risk factor of uncontrolled DM2 (HbA1c >12), family hx of MI in sister and mother, and hx of nonadherence with HTN meds  - tele monitoring, c/w trop trend, cardiology consult for eval (may require cardiac cath)  - patient completed above asa load on day of presentation, c/w ASA 81d and statin

## 2019-12-18 NOTE — DISCHARGE NOTE PROVIDER - NSDCCPCAREPLAN_GEN_ALL_CORE_FT
PRINCIPAL DISCHARGE DIAGNOSIS  Diagnosis: Chest pain of uncertain etiology  Assessment and Plan of Treatment:       SECONDARY DISCHARGE DIAGNOSES  Diagnosis: Type 2 diabetes mellitus, uncontrolled, with neuropathy  Assessment and Plan of Treatment: HgA1C this admission 12.3.  Make sure you get your HgA1c checked every three months.  If you take oral diabetes medications, check your blood glucose two times a day.  If you take insulin, check your blood glucose before meals and at bedtime.  It's important not to skip any meals.  Keep a log of your blood glucose results and always take it with you to your doctor appointments.  Keep a list of your current medications including injectables and over the counter medications and bring this medication list with you to all your doctor appointments.  If you have not seen your ophthalmologist this year call for appointment.  Check your feet daily for redness, sores, or openings. Do not self treat. If no improvement in two days call your primary care physician for an appointment.  Low blood sugar (hypoglycemia) is a blood sugar below 70mg/dl. Check your blood sugar if you feel signs/symptoms of hypoglycemia. If your blood sugar is below 70 take 15 grams of carbohydrates (ex 4 oz of apple juice, 3-4 glucose tablets, or 4-6 oz of regular soda) wait 15 minutes and repeat blood sugar to make sure it comes up above 70.  If your blood sugar is above 70 and you are due for a meal, have a meal.  If you are not due for a meal have a snack.  This snack helps keeps your blood sugar at a safe range.      Diagnosis: Essential hypertension  Assessment and Plan of Treatment: Low salt diet  Activity as tolerated.  Take all medication as prescribed.  Follow up with your medical doctor for routine blood pressure monitoring at your next visit.  Notify your doctor if you have any of the following symptoms:   Dizziness, Lightheadedness, Blurry vision, Headache, Chest pain, Shortness of breath PRINCIPAL DISCHARGE DIAGNOSIS  Diagnosis: Chest pain of uncertain etiology  Assessment and Plan of Treatment:       SECONDARY DISCHARGE DIAGNOSES  Diagnosis: Essential hypertension  Assessment and Plan of Treatment: Low salt diet  Activity as tolerated.  Take all medication as prescribed.  Follow up with your medical doctor for routine blood pressure monitoring at your next visit.  Notify your doctor if you have any of the following symptoms:   Dizziness, Lightheadedness, Blurry vision, Headache, Chest pain, Shortness of breath      Diagnosis: Type 2 diabetes mellitus, uncontrolled, with neuropathy  Assessment and Plan of Treatment: HgA1C this admission 12.3.  Make sure you get your HgA1c checked every three months.  If you take oral diabetes medications, check your blood glucose two times a day.  If you take insulin, check your blood glucose before meals and at bedtime.  It's important not to skip any meals.  Keep a log of your blood glucose results and always take it with you to your doctor appointments.  Keep a list of your current medications including injectables and over the counter medications and bring this medication list with you to all your doctor appointments.  If you have not seen your ophthalmologist this year call for appointment.  Check your feet daily for redness, sores, or openings. Do not self treat. If no improvement in two days call your primary care physician for an appointment.  Low blood sugar (hypoglycemia) is a blood sugar below 70mg/dl. Check your blood sugar if you feel signs/symptoms of hypoglycemia. If your blood sugar is below 70 take 15 grams of carbohydrates (ex 4 oz of apple juice, 3-4 glucose tablets, or 4-6 oz of regular soda) wait 15 minutes and repeat blood sugar to make sure it comes up above 70.  If your blood sugar is above 70 and you are due for a meal, have a meal.  If you are not due for a meal have a snack.  This snack helps keeps your blood sugar at a safe range. PRINCIPAL DISCHARGE DIAGNOSIS  Diagnosis: Chest pain of uncertain etiology  Assessment and Plan of Treatment: Chest pain - suspect episode of arrhythmia in setting of hypomagnesemia (possibly SVT outside the hospital  CT heart with coronaries - focus of coronary artery calcification in the mid LAD causing minimal atherosclerosis. Coronary calcium score cannot be calculated secondary to body habitus. Given the above findings, she was cleared for discharge to home.   Follow up with cardiology outpatient for a holter monitor to r/o arrhythmia (possibly had episode of SVT in setting of hypomagnesemia from chronic PPI use?). Telemetry here did not  an arrhythmias.   Take Magnesium supplement as recommended and follow up with PMD/cardiologist in early next week for a blood work      SECONDARY DISCHARGE DIAGNOSES  Diagnosis: Essential hypertension  Assessment and Plan of Treatment: Low salt diet  Activity as tolerated.  Take all medication as prescribed.  Follow up with your medical doctor for routine blood pressure monitoring at your next visit.  Notify your doctor if you have any of the following symptoms:   Dizziness, Lightheadedness, Blurry vision, Headache, Chest pain, Shortness of breath      Diagnosis: Type 2 diabetes mellitus, uncontrolled, with neuropathy  Assessment and Plan of Treatment: HgA1C this admission 12.3.  Make sure you get your HgA1c checked every three months.  If you take oral diabetes medications, check your blood glucose two times a day.  If you take insulin, check your blood glucose before meals and at bedtime.  It's important not to skip any meals.  Keep a log of your blood glucose results and always take it with you to your doctor appointments.  Keep a list of your current medications including injectables and over the counter medications and bring this medication list with you to all your doctor appointments.  If you have not seen your ophthalmologist this year call for appointment.  Check your feet daily for redness, sores, or openings. Do not self treat. If no improvement in two days call your primary care physician for an appointment.  Low blood sugar (hypoglycemia) is a blood sugar below 70mg/dl. Check your blood sugar if you feel signs/symptoms of hypoglycemia. If your blood sugar is below 70 take 15 grams of carbohydrates (ex 4 oz of apple juice, 3-4 glucose tablets, or 4-6 oz of regular soda) wait 15 minutes and repeat blood sugar to make sure it comes up above 70.  If your blood sugar is above 70 and you are due for a meal, have a meal.  If you are not due for a meal have a snack.  This snack helps keeps your blood sugar at a safe range.

## 2019-12-18 NOTE — DIETITIAN INITIAL EVALUATION ADULT. - PERTINENT MEDS FT
MEDICATIONS  (STANDING):  amLODIPine   Tablet 10 milliGRAM(s) Oral daily  aspirin enteric coated 81 milliGRAM(s) Oral daily  atorvastatin 40 milliGRAM(s) Oral at bedtime  budesonide 160 MICROgram(s)/formoterol 4.5 MICROgram(s) Inhaler 2 Puff(s) Inhalation two times a day  dextrose 5%. 1000 milliLiter(s) (50 mL/Hr) IV Continuous <Continuous>  dextrose 50% Injectable 12.5 Gram(s) IV Push once  dextrose 50% Injectable 25 Gram(s) IV Push once  dextrose 50% Injectable 25 Gram(s) IV Push once  enoxaparin Injectable 40 milliGRAM(s) SubCutaneous daily  insulin glargine Injectable (LANTUS) 24 Unit(s) SubCutaneous at bedtime  insulin lispro (HumaLOG) corrective regimen sliding scale   SubCutaneous three times a day before meals  insulin lispro (HumaLOG) corrective regimen sliding scale   SubCutaneous at bedtime  insulin lispro Injectable (HumaLOG) 8 Unit(s) SubCutaneous three times a day before meals  lisinopril 20 milliGRAM(s) Oral two times a day    MEDICATIONS  (PRN):  ALBUTerol    90 MICROgram(s) HFA Inhaler 2 Puff(s) Inhalation every 6 hours PRN Shortness of Breath and/or Wheezing  dextrose 40% Gel 15 Gram(s) Oral once PRN Blood Glucose LESS THAN 70 milliGRAM(s)/deciliter  glucagon  Injectable 1 milliGRAM(s) IntraMuscular once PRN Glucose LESS THAN 70 milligrams/deciliter

## 2019-12-18 NOTE — DIETITIAN INITIAL EVALUATION ADULT. - PHYSICAL APPEARANCE
well nourished/obese/other (specify) Ht: 65in, Dosing Wt: 261lbs, BMI: 43.4kg/m2, IBW: 125lbs +/- 10%, %IBW: 209%, Adjusted Wt: 159lbs  Edema: 1+ generalized, Skin per nursing flowsheets: no pressure injury noted

## 2019-12-18 NOTE — CONSULT NOTE ADULT - ASSESSMENT
49F w/ uncontrolled DM2 (A1c12.3), HTN, MVP, obesity, multinodular thyroid, reactive airway disease (WTC), and recent admit to [facility] for chest pain (d/c 12/6/19) presents to [facility] for evaluation of chest pain. Patient reports that since discharge from [facility] she has continued to have intermittent "zing" shooting pain over left chest and left breast, w/ numbness of left shoulder and left arm, associated sob, occurring at rest, and improving with aspirin. She took ASA 81 chewable x5 at approx 7pm when she had the chest pain and felt the way people have described NSVT to her and became concerned that the patti had some alleviation with aspirin however she felt impending doom which led her to drive from work to the hospital. In her family, her sister and mother at young age are reported to have MI. She reports poor adherence to medication over the last year. Of note w/u as [facility] neg for PE on CTPA, did not identify abnormal NM stress c/w MI, and did not identify significant structural abnormalities on TTE. There was consideration to pursue cardiac catheterization however was deferred. Last multinodular thyroid identified incidentally on last admit w/ TFTs wnl w/ planned outpatient w/u.  pt continued to have both exertional and non exertional chest pain associated with intermittent palpitation, no hx of syncope..  pt was told by other cardiologist to have cath.   pt with sig risk factor for cad, including strong fhx, DM, HTN, Hypercholesteremia obesity, with intermittent palpitation  will consider cardiac ct and check for calcium score  event monitor as out pt   plan discussed with pt , agreed.  thank you

## 2019-12-18 NOTE — PROGRESS NOTE ADULT - PROBLEM SELECTOR PLAN 9
known lung nodule per patient, associated w/ WTC exposure/ being followed by [facility]
known lung nodule per patient, associated w/ WTC exposure/ being followed by [facility]

## 2019-12-18 NOTE — PROGRESS NOTE ADULT - PROBLEM SELECTOR PLAN 3
nontoxic exam and no sign of infection  suspect reactive  Resolved
nontoxic exam and no sign of infection  suspect reactive  Resolved

## 2019-12-18 NOTE — PROGRESS NOTE ADULT - PROBLEM SELECTOR PLAN 6
ISS w/ FS  HbA1c >12  Insulin regimen per endo recs.   Nutritionist consult. I also discussed lifestyle and dietary modification with the patient extensively.
ISS w/ FS  HbA1c >12  ENdo recommend :  Lantus 24 units sq qhs and humalog 8 units tid-ac  Will start lantus tonight / of note, pt was on Lantus during last admission   Nurtritionist consults

## 2019-12-18 NOTE — PROGRESS NOTE ADULT - PROBLEM SELECTOR PLAN 1
- Pt was recently at [facility] w/ TTE and NM stress not c/w MI, patient continues to have atypical chest pain improving with aspirin and with increased risk factor of uncontrolled DM2 (HbA1c >12), family hx of MI in sister and mother, and hx of nonadherence with HTN meds  - cont with tele monitoring  - Troponin  : 6-->17-->14 with no acute ST changes on ECG   - cardiology consult is noted; however the patient was requesting a second opinion cardiology consult- Dr. Nichole called - given her elevated risk factors for CAD, she'll undergo a CT coronaries. If they are okay, will be able to d/c home today.  - cont ASA/Atorvastatin/Lisinopril and Amlodipine   - Monitor on telemetry
- Pt was recently at [facility] w/ TTE and NM stress not c/w MI, patient continues to have atypical chest pain improving with aspirin and with increased risk factor of uncontrolled DM2 (HbA1c >12), family hx of MI in sister and mother, and hx of nonadherence with HTN meds  - cont with tele monitoring  - Troponin  : 6-->17-->14 with no acute ST changes on ECG   - cardiology consult is noted / as recommended   :  Would focus management at control of diabetes and optimizing medications to modify cardiac risk.   - cont ASA/Atorvastatin/Lisinopril and Amlodipine   - monitor overnight for any arrhythmia

## 2019-12-18 NOTE — DISCHARGE NOTE PROVIDER - CARE PROVIDER_API CALL
Jose Nichole (DO)  Cardiovascular Disease  [address], [address]  [place] [address]  Phone: [phone]  Fax: [phone]  Follow Up Time: Jose Nichole (DO)  Cardiovascular Disease  [address], [address]  [place] [address]  Phone: [phone]  Fax: [phone]  Follow Up Time: 1-3 days

## 2019-12-18 NOTE — DISCHARGE NOTE PROVIDER - NSDCMRMEDTOKEN_GEN_ALL_CORE_FT
aspirin 81 mg oral tablet, chewable: 1 tab(s) orally once a day  budesonide-formoterol 160 mcg-4.5 mcg/inh inhalation aerosol: 2 puff(s) inhaled 2 times a day  glimepiride 2 mg oral tablet: 1 tab(s) orally once a day   Lipitor 40 mg oral tablet: 1 tab(s) orally once a day  lisinopril 20 mg oral tablet: 1 tab(s) orally 2 times a day   metFORMIN 500 mg oral tablet: 1 tab(s) orally 2 times a day   Norvasc 5 mg oral tablet: 1 tab(s) orally once a day  Ventolin HFA 90 mcg/inh inhalation aerosol: 2 puff(s) inhaled 4 times a day, As Needed amLODIPine 10 mg oral tablet: 1 tab(s) orally once a day  aspirin 81 mg oral tablet, chewable: 1 tab(s) orally once a day  budesonide-formoterol 160 mcg-4.5 mcg/inh inhalation aerosol: 2 puff(s) inhaled 2 times a day  glimepiride 2 mg oral tablet: 1 tab(s) orally once a day   Lipitor 40 mg oral tablet: 1 tab(s) orally once a day  lisinopril 20 mg oral tablet: 1 tab(s) orally 2 times a day   metFORMIN 500 mg oral tablet: 1 tab(s) orally 2 times a day   Ventolin HFA 90 mcg/inh inhalation aerosol: 2 puff(s) inhaled 4 times a day, As Needed amLODIPine 10 mg oral tablet: 1 tab(s) orally once a day  aspirin 81 mg oral tablet, chewable: 1 tab(s) orally once a day  budesonide-formoterol 160 mcg-4.5 mcg/inh inhalation aerosol: 2 puff(s) inhaled 2 times a day  glimepiride 2 mg oral tablet: 1 tab(s) orally once a day   Lipitor 40 mg oral tablet: 1 tab(s) orally once a day  lisinopril 20 mg oral tablet: 1 tab(s) orally 2 times a day   magnesium oxide 400 mg (241.3 mg elemental magnesium) oral tablet: 1 tab(s) orally 2 times a day (with meals)  metFORMIN 500 mg oral tablet: 1 tab(s) orally 2 times a day   Ventolin HFA 90 mcg/inh inhalation aerosol: 2 puff(s) inhaled 4 times a day, As Needed

## 2019-12-18 NOTE — PROGRESS NOTE ADULT - PROBLEM SELECTOR PLAN 4
monitor clinically
hx of MVP however TTE at [facility] not w/ significant valvular disease  monitor clinically

## 2019-12-19 LAB
ANION GAP SERPL CALC-SCNC: 14 MMOL/L — SIGNIFICANT CHANGE UP (ref 5–17)
BUN SERPL-MCNC: 15 MG/DL — SIGNIFICANT CHANGE UP (ref 7–23)
CALCIUM SERPL-MCNC: 9.4 MG/DL — SIGNIFICANT CHANGE UP (ref 8.4–10.5)
CHLORIDE SERPL-SCNC: 103 MMOL/L — SIGNIFICANT CHANGE UP (ref 96–108)
CO2 SERPL-SCNC: 24 MMOL/L — SIGNIFICANT CHANGE UP (ref 22–31)
CREAT SERPL-MCNC: 0.64 MG/DL — SIGNIFICANT CHANGE UP (ref 0.5–1.3)
GLUCOSE BLDC GLUCOMTR-MCNC: 134 MG/DL — HIGH (ref 70–99)
GLUCOSE BLDC GLUCOMTR-MCNC: 157 MG/DL — HIGH (ref 70–99)
GLUCOSE BLDC GLUCOMTR-MCNC: 170 MG/DL — HIGH (ref 70–99)
GLUCOSE BLDC GLUCOMTR-MCNC: 171 MG/DL — HIGH (ref 70–99)
GLUCOSE SERPL-MCNC: 175 MG/DL — HIGH (ref 70–99)
HCT VFR BLD CALC: 37.9 % — SIGNIFICANT CHANGE UP (ref 34.5–45)
HGB BLD-MCNC: 12 G/DL — SIGNIFICANT CHANGE UP (ref 11.5–15.5)
MCHC RBC-ENTMCNC: 28 PG — SIGNIFICANT CHANGE UP (ref 27–34)
MCHC RBC-ENTMCNC: 31.7 GM/DL — LOW (ref 32–36)
MCV RBC AUTO: 88.6 FL — SIGNIFICANT CHANGE UP (ref 80–100)
PLATELET # BLD AUTO: 244 K/UL — SIGNIFICANT CHANGE UP (ref 150–400)
POTASSIUM SERPL-MCNC: 3.8 MMOL/L — SIGNIFICANT CHANGE UP (ref 3.5–5.3)
POTASSIUM SERPL-SCNC: 3.8 MMOL/L — SIGNIFICANT CHANGE UP (ref 3.5–5.3)
RBC # BLD: 4.28 M/UL — SIGNIFICANT CHANGE UP (ref 3.8–5.2)
RBC # FLD: 13.3 % — SIGNIFICANT CHANGE UP (ref 10.3–14.5)
SODIUM SERPL-SCNC: 141 MMOL/L — SIGNIFICANT CHANGE UP (ref 135–145)
TSH SERPL-MCNC: 2.12 UIU/ML — SIGNIFICANT CHANGE UP (ref 0.27–4.2)
WBC # BLD: 10.56 K/UL — HIGH (ref 3.8–10.5)
WBC # FLD AUTO: 10.56 K/UL — HIGH (ref 3.8–10.5)

## 2019-12-19 PROCEDURE — 75574 CT ANGIO HRT W/3D IMAGE: CPT | Mod: 26

## 2019-12-19 PROCEDURE — 99239 HOSP IP/OBS DSCHRG MGMT >30: CPT

## 2019-12-19 RX ORDER — METOPROLOL TARTRATE 50 MG
50 TABLET ORAL ONCE
Refills: 0 | Status: DISCONTINUED | OUTPATIENT
Start: 2019-12-19 | End: 2019-12-20

## 2019-12-19 RX ORDER — AMLODIPINE BESYLATE 2.5 MG/1
1 TABLET ORAL
Qty: 30 | Refills: 0
Start: 2019-12-19 | End: 2020-01-17

## 2019-12-19 RX ORDER — AMLODIPINE BESYLATE 2.5 MG/1
1 TABLET ORAL
Qty: 0 | Refills: 0 | DISCHARGE

## 2019-12-19 RX ADMIN — AMLODIPINE BESYLATE 10 MILLIGRAM(S): 2.5 TABLET ORAL at 05:04

## 2019-12-19 RX ADMIN — Medication 2: at 18:04

## 2019-12-19 RX ADMIN — LISINOPRIL 20 MILLIGRAM(S): 2.5 TABLET ORAL at 05:03

## 2019-12-19 RX ADMIN — INSULIN GLARGINE 24 UNIT(S): 100 INJECTION, SOLUTION SUBCUTANEOUS at 21:33

## 2019-12-19 RX ADMIN — Medication 8 UNIT(S): at 12:18

## 2019-12-19 RX ADMIN — BUDESONIDE AND FORMOTEROL FUMARATE DIHYDRATE 2 PUFF(S): 160; 4.5 AEROSOL RESPIRATORY (INHALATION) at 05:03

## 2019-12-19 RX ADMIN — ATORVASTATIN CALCIUM 40 MILLIGRAM(S): 80 TABLET, FILM COATED ORAL at 21:33

## 2019-12-19 RX ADMIN — Medication 8 UNIT(S): at 08:20

## 2019-12-19 RX ADMIN — LISINOPRIL 20 MILLIGRAM(S): 2.5 TABLET ORAL at 18:05

## 2019-12-19 RX ADMIN — Medication 8 UNIT(S): at 18:04

## 2019-12-19 RX ADMIN — BUDESONIDE AND FORMOTEROL FUMARATE DIHYDRATE 2 PUFF(S): 160; 4.5 AEROSOL RESPIRATORY (INHALATION) at 18:04

## 2019-12-19 RX ADMIN — Medication 2: at 12:17

## 2019-12-19 RX ADMIN — Medication 81 MILLIGRAM(S): at 11:08

## 2019-12-19 NOTE — CHART NOTE - NSCHARTNOTEFT_GEN_A_CORE
Medicine Attending - Discharge Day Note:  ================================================    # Chest pain - suspect episode of arrhythmia (possibly SVT outside the hospital)  # uncontrolled DM2 (A1c12.3)  # Morbid obesity  # essential hypertension  # reactive airway disease ([employer] [place])    The patient was evaluated by cardiology. CT heart with coronaries performed today showed focus of coronary artery calcification in the mid LAD causing minimal atherosclerosis. Coronary calcium score cannot be calculated secondary to body habitus. Given the above findings, she was cleared for discharge to home. She plans of following with cardiology outpatient for a halter monitor to r/o arrhythmia (possibly had episode of SVT?). Telemetry here did not  an arrhythmias.     Discharge time spent 35 minutes.     Chidi Desouza MD, MICHELE  [phone] Medicine Attending - Discharge Day Note:  ================================================    # Chest pain - suspect episode of arrhythmia in setting of hypomagnesemia (possibly SVT outside the hospital)  # hypomagnesemia   # uncontrolled DM2 (A1c12.3)  # Morbid obesity  # essential hypertension  # reactive airway disease ([facility])    The patient was evaluated by cardiology. CT heart with coronaries performed today showed focus of coronary artery calcification in the mid LAD causing minimal atherosclerosis. Coronary calcium score cannot be calculated secondary to body habitus. Given the above findings, she was cleared for discharge to home. She plans of following with cardiology outpatient for a halter monitor to r/o arrhythmia (possibly had episode of SVT in setting of hypomagnesemia from chronic PPI use?). Telemetry here did not  an arrhythmias.     Discharge time spent 35 minutes.     Chidi Desouza MD, MICHELE  [phone]

## 2019-12-19 NOTE — PROGRESS NOTE ADULT - SUBJECTIVE AND OBJECTIVE BOX
CARDIOLOGY     PROGRESS  NOTE   ________________________________________________    CHIEF COMPLAINT:Patient is a 49y old  Female who presents with a chief complaint of 49F w/ chest pain (18 Dec 2019 18:55)  doing better, no palpitation.  	  REVIEW OF SYSTEMS:  CONSTITUTIONAL: No fever, weight loss, or fatigue  EYES: No eye pain, visual disturbances, or discharge  ENT:  No difficulty hearing, tinnitus, vertigo; No sinus or throat pain  NECK: No pain or stiffness  RESPIRATORY: No cough, wheezing, chills or hemoptysis; No Shortness of Breath  CARDIOVASCULAR: No chest pain, palpitations, passing out, dizziness, or leg swelling  GASTROINTESTINAL: No abdominal or epigastric pain. No nausea, vomiting, or hematemesis; No diarrhea or constipation. No melena or hematochezia.  GENITOURINARY: No dysuria, frequency, hematuria, or incontinence  NEUROLOGICAL: No headaches, memory loss, loss of strength, numbness, or tremors  SKIN: No itching, burning, rashes, or lesions   LYMPH Nodes: No enlarged glands  ENDOCRINE: No heat or cold intolerance; No hair loss  MUSCULOSKELETAL: No joint pain or swelling; No muscle, back, or extremity pain  PSYCHIATRIC: No depression, anxiety, mood swings, or difficulty sleeping  HEME/LYMPH: No easy bruising, or bleeding gums  ALLERGY AND IMMUNOLOGIC: No hives or eczema	    [ ] All others negative	  [ ] Unable to obtain    PHYSICAL EXAM:  T(C): 36.5 (12-19-19 @ 04:03), Max: 36.8 (12-18-19 @ 20:30)  HR: 76 (12-19-19 @ 04:03) (76 - 88)  BP: 104/71 (12-19-19 @ 04:03) (104/71 - 138/82)  RR: 18 (12-19-19 @ 04:03) (18 - 20)  SpO2: 97% (12-19-19 @ 04:03) (96% - 99%)  Wt(kg): --  I&O's Summary    18 Dec 2019 07:01  -  19 Dec 2019 07:00  --------------------------------------------------------  IN: 600 mL / OUT: 0 mL / NET: 600 mL        Appearance: Normal	  HEENT:   Normal oral mucosa, PERRL, EOMI	  Lymphatic: No lymphadenopathy  Cardiovascular: Normal S1 S2, No JVD, + murmurs, No edema  Respiratory: Lungs clear to auscultation	  Psychiatry: A & O x 3, Mood & affect appropriate  Gastrointestinal:  Soft, Non-tender, + BS	  Skin: No rashes, No ecchymoses, No cyanosis	  Neurologic: Non-focal  Extremities: Normal range of motion, No clubbing, cyanosis or edema  Vascular: Peripheral pulses palpable 2+ bilaterally    MEDICATIONS  (STANDING):  amLODIPine   Tablet 10 milliGRAM(s) Oral daily  aspirin enteric coated 81 milliGRAM(s) Oral daily  atorvastatin 40 milliGRAM(s) Oral at bedtime  budesonide 160 MICROgram(s)/formoterol 4.5 MICROgram(s) Inhaler 2 Puff(s) Inhalation two times a day  dextrose 5%. 1000 milliLiter(s) (50 mL/Hr) IV Continuous <Continuous>  dextrose 50% Injectable 12.5 Gram(s) IV Push once  dextrose 50% Injectable 25 Gram(s) IV Push once  dextrose 50% Injectable 25 Gram(s) IV Push once  enoxaparin Injectable 40 milliGRAM(s) SubCutaneous daily  insulin glargine Injectable (LANTUS) 24 Unit(s) SubCutaneous at bedtime  insulin lispro (HumaLOG) corrective regimen sliding scale   SubCutaneous three times a day before meals  insulin lispro (HumaLOG) corrective regimen sliding scale   SubCutaneous at bedtime  insulin lispro Injectable (HumaLOG) 8 Unit(s) SubCutaneous three times a day before meals  lisinopril 20 milliGRAM(s) Oral two times a day      TELEMETRY: 	    ECG:  	  RADIOLOGY:  OTHER: 	  	  LABS:	 	    CARDIAC MARKERS:                                12.0   10.56 )-----------( 244      ( 19 Dec 2019 08:13 )             37.9     12-19    141  |  103  |  15  ----------------------------<  175<H>  3.8   |  24  |  0.64    Ca    9.4      19 Dec 2019 05:42  Mg     1.6     12-18    TPro  7.5  /  Alb  3.8  /  TBili  0.3  /  DBili  x   /  AST  21  /  ALT  31  /  AlkPhos  99  12-18    proBNP: Serum Pro-Brain Natriuretic Peptide: 19 pg/mL (12-16 @ 23:19)    Lipid Profile: Cholesterol 210    HDL 37      HgA1c: Hemoglobin A1C, Whole Blood: 12.3 % (12-05 @ 10:56)  Hemoglobin A1C, Whole Blood: 12.1 % (12-04 @ 18:42)    TSH: Thyroid Stimulating Hormone, Serum: 2.12 uIU/mL (12-19 @ 01:04)  Thyroid Stimulating Hormone, Serum: 4.75 uIU/mL (12-17 @ 04:28)  Thyroid Stimulating Hormone, Serum: 3.410 uIU/mL (12-05 @ 07:25)          Assessment and plan  ---------------------------  49F w/ uncontrolled DM2 (A1c12.3), HTN, MVP, obesity, multinodular thyroid, reactive airway disease (WTC), and recent admit to [facility] for chest pain (d/c 12/6/19) presents to [facility] for evaluation of chest pain. Patient reports that since discharge from [facility] she has continued to have intermittent "zing" shooting pain over left chest and left breast, w/ numbness of left shoulder and left arm, associated sob, occurring at rest, and improving with aspirin. She took ASA 81 chewable x5 at approx 7pm when she had the chest pain and felt the way people have described NSVT to her and became concerned that the patti had some alleviation with aspirin however she felt impending doom which led her to drive from work to the hospital. In her family, her sister and mother at young age are reported to have MI. She reports poor adherence to medication over the last year. Of note w/u as [facility] neg for PE on CTPA, did not identify abnormal NM stress c/w MI, and did not identify significant structural abnormalities on TTE. There was consideration to pursue cardiac catheterization however was deferred. Last multinodular thyroid identified incidentally on last admit w/ TFTs wnl w/ planned outpatient w/u.  pt continued to have both exertional and non exertional chest pain associated with intermittent palpitation, no hx of syncope..  pt was told by other cardiologist to have cath.   pt with sig risk factor for cad, including strong fhx, DM, HTN, Hypercholesteremia obesity, with intermittent palpitation  will consider cardiac ct and check for calcium score  event monitor as out pt   no arrythmia overnight  awaiting cardiac ct today  bp is well controlled

## 2019-12-20 ENCOUNTER — TRANSCRIPTION ENCOUNTER (OUTPATIENT)
Age: 49
End: 2019-12-20

## 2019-12-20 VITALS
RESPIRATION RATE: 18 BRPM | OXYGEN SATURATION: 96 % | SYSTOLIC BLOOD PRESSURE: 97 MMHG | DIASTOLIC BLOOD PRESSURE: 65 MMHG | TEMPERATURE: 99 F | HEART RATE: 84 BPM

## 2019-12-20 LAB
ANION GAP SERPL CALC-SCNC: 12 MMOL/L — SIGNIFICANT CHANGE UP (ref 5–17)
BUN SERPL-MCNC: 16 MG/DL — SIGNIFICANT CHANGE UP (ref 7–23)
CALCIUM SERPL-MCNC: 9.5 MG/DL — SIGNIFICANT CHANGE UP (ref 8.4–10.5)
CHLORIDE SERPL-SCNC: 102 MMOL/L — SIGNIFICANT CHANGE UP (ref 96–108)
CO2 SERPL-SCNC: 26 MMOL/L — SIGNIFICANT CHANGE UP (ref 22–31)
CREAT SERPL-MCNC: 0.66 MG/DL — SIGNIFICANT CHANGE UP (ref 0.5–1.3)
GLUCOSE BLDC GLUCOMTR-MCNC: 140 MG/DL — HIGH (ref 70–99)
GLUCOSE BLDC GLUCOMTR-MCNC: 172 MG/DL — HIGH (ref 70–99)
GLUCOSE SERPL-MCNC: 198 MG/DL — HIGH (ref 70–99)
HCT VFR BLD CALC: 38.4 % — SIGNIFICANT CHANGE UP (ref 34.5–45)
HGB BLD-MCNC: 12.3 G/DL — SIGNIFICANT CHANGE UP (ref 11.5–15.5)
MAGNESIUM SERPL-MCNC: 1.5 MG/DL — LOW (ref 1.6–2.6)
MCHC RBC-ENTMCNC: 27.8 PG — SIGNIFICANT CHANGE UP (ref 27–34)
MCHC RBC-ENTMCNC: 32 GM/DL — SIGNIFICANT CHANGE UP (ref 32–36)
MCV RBC AUTO: 86.9 FL — SIGNIFICANT CHANGE UP (ref 80–100)
PLATELET # BLD AUTO: 227 K/UL — SIGNIFICANT CHANGE UP (ref 150–400)
POTASSIUM SERPL-MCNC: 3.8 MMOL/L — SIGNIFICANT CHANGE UP (ref 3.5–5.3)
POTASSIUM SERPL-SCNC: 3.8 MMOL/L — SIGNIFICANT CHANGE UP (ref 3.5–5.3)
RBC # BLD: 4.42 M/UL — SIGNIFICANT CHANGE UP (ref 3.8–5.2)
RBC # FLD: 13.2 % — SIGNIFICANT CHANGE UP (ref 10.3–14.5)
SODIUM SERPL-SCNC: 140 MMOL/L — SIGNIFICANT CHANGE UP (ref 135–145)
WBC # BLD: 9.71 K/UL — SIGNIFICANT CHANGE UP (ref 3.8–10.5)
WBC # FLD AUTO: 9.71 K/UL — SIGNIFICANT CHANGE UP (ref 3.8–10.5)

## 2019-12-20 PROCEDURE — 85730 THROMBOPLASTIN TIME PARTIAL: CPT

## 2019-12-20 PROCEDURE — 36415 COLL VENOUS BLD VENIPUNCTURE: CPT

## 2019-12-20 PROCEDURE — 82553 CREATINE MB FRACTION: CPT

## 2019-12-20 PROCEDURE — 83880 ASSAY OF NATRIURETIC PEPTIDE: CPT

## 2019-12-20 PROCEDURE — 85027 COMPLETE CBC AUTOMATED: CPT

## 2019-12-20 PROCEDURE — G0378: CPT

## 2019-12-20 PROCEDURE — 84100 ASSAY OF PHOSPHORUS: CPT

## 2019-12-20 PROCEDURE — 80053 COMPREHEN METABOLIC PANEL: CPT

## 2019-12-20 PROCEDURE — 93970 EXTREMITY STUDY: CPT

## 2019-12-20 PROCEDURE — 85610 PROTHROMBIN TIME: CPT

## 2019-12-20 PROCEDURE — 82550 ASSAY OF CK (CPK): CPT

## 2019-12-20 PROCEDURE — 99285 EMERGENCY DEPT VISIT HI MDM: CPT | Mod: 25

## 2019-12-20 PROCEDURE — 93005 ELECTROCARDIOGRAM TRACING: CPT

## 2019-12-20 PROCEDURE — 84484 ASSAY OF TROPONIN QUANT: CPT

## 2019-12-20 PROCEDURE — 85652 RBC SED RATE AUTOMATED: CPT

## 2019-12-20 PROCEDURE — 82962 GLUCOSE BLOOD TEST: CPT

## 2019-12-20 PROCEDURE — 71046 X-RAY EXAM CHEST 2 VIEWS: CPT

## 2019-12-20 PROCEDURE — 84702 CHORIONIC GONADOTROPIN TEST: CPT

## 2019-12-20 PROCEDURE — 75574 CT ANGIO HRT W/3D IMAGE: CPT

## 2019-12-20 PROCEDURE — 84443 ASSAY THYROID STIM HORMONE: CPT

## 2019-12-20 PROCEDURE — 83735 ASSAY OF MAGNESIUM: CPT

## 2019-12-20 PROCEDURE — 94640 AIRWAY INHALATION TREATMENT: CPT

## 2019-12-20 PROCEDURE — 80048 BASIC METABOLIC PNL TOTAL CA: CPT

## 2019-12-20 RX ORDER — MAGNESIUM OXIDE 400 MG ORAL TABLET 241.3 MG
1 TABLET ORAL
Qty: 60 | Refills: 0
Start: 2019-12-20 | End: 2020-01-18

## 2019-12-20 RX ORDER — MAGNESIUM OXIDE 400 MG ORAL TABLET 241.3 MG
400 TABLET ORAL
Refills: 0 | Status: DISCONTINUED | OUTPATIENT
Start: 2019-12-20 | End: 2019-12-20

## 2019-12-20 RX ADMIN — Medication 8 UNIT(S): at 12:47

## 2019-12-20 RX ADMIN — BUDESONIDE AND FORMOTEROL FUMARATE DIHYDRATE 2 PUFF(S): 160; 4.5 AEROSOL RESPIRATORY (INHALATION) at 05:48

## 2019-12-20 RX ADMIN — Medication 81 MILLIGRAM(S): at 12:47

## 2019-12-20 RX ADMIN — AMLODIPINE BESYLATE 10 MILLIGRAM(S): 2.5 TABLET ORAL at 09:07

## 2019-12-20 RX ADMIN — Medication 8 UNIT(S): at 08:57

## 2019-12-20 RX ADMIN — LISINOPRIL 20 MILLIGRAM(S): 2.5 TABLET ORAL at 09:07

## 2019-12-20 RX ADMIN — MAGNESIUM OXIDE 400 MG ORAL TABLET 400 MILLIGRAM(S): 241.3 TABLET ORAL at 09:07

## 2019-12-20 RX ADMIN — Medication 2: at 12:48

## 2019-12-20 NOTE — DISCHARGE NOTE NURSING/CASE MANAGEMENT/SOCIAL WORK - PATIENT PORTAL LINK FT
You can access the FollowMyHealth Patient Portal offered by [facility] by registering at the following website: [email or web address]. By joining [employer]’s FollowMyHealth portal, you will also be able to view your health information using other applications (apps) compatible with our system.

## 2019-12-20 NOTE — PROGRESS NOTE ADULT - SUBJECTIVE AND OBJECTIVE BOX
CARDIOLOGY     PROGRESS  NOTE   ________________________________________________    CHIEF COMPLAINT:Patient is a 49y old  Female who presents with a chief complaint of 49F w/ chest pain (19 Dec 2019 08:43)  doing well.  	  REVIEW OF SYSTEMS:  CONSTITUTIONAL: No fever, weight loss, or fatigue  EYES: No eye pain, visual disturbances, or discharge  ENT:  No difficulty hearing, tinnitus, vertigo; No sinus or throat pain  NECK: No pain or stiffness  RESPIRATORY: No cough, wheezing, chills or hemoptysis; No Shortness of Breath  CARDIOVASCULAR: No chest pain, palpitations, passing out, dizziness, or leg swelling  GASTROINTESTINAL: No abdominal or epigastric pain. No nausea, vomiting, or hematemesis; No diarrhea or constipation. No melena or hematochezia.  GENITOURINARY: No dysuria, frequency, hematuria, or incontinence  NEUROLOGICAL: No headaches, memory loss, loss of strength, numbness, or tremors  SKIN: No itching, burning, rashes, or lesions   LYMPH Nodes: No enlarged glands  ENDOCRINE: No heat or cold intolerance; No hair loss  MUSCULOSKELETAL: No joint pain or swelling; No muscle, back, or extremity pain  PSYCHIATRIC: No depression, anxiety, mood swings, or difficulty sleeping  HEME/LYMPH: No easy bruising, or bleeding gums  ALLERGY AND IMMUNOLOGIC: No hives or eczema	    [ ] All others negative	  [ ] Unable to obtain    PHYSICAL EXAM:  T(C): 36.4 (12-20-19 @ 04:05), Max: 36.8 (12-19-19 @ 11:58)  HR: 81 (12-20-19 @ 09:04) (70 - 90)  BP: 132/89 (12-20-19 @ 09:04) (95/61 - 137/60)  RR: 18 (12-20-19 @ 04:05) (18 - 18)  SpO2: 98% (12-20-19 @ 04:05) (97% - 99%)  Wt(kg): --  I&O's Summary    19 Dec 2019 07:01  -  20 Dec 2019 07:00  --------------------------------------------------------  IN: 360 mL / OUT: 0 mL / NET: 360 mL        Appearance: Normal	  HEENT:   Normal oral mucosa, PERRL, EOMI	  Lymphatic: No lymphadenopathy  Cardiovascular: Normal S1 S2, No JVD, + murmurs, No edema  Respiratory: Lungs clear to auscultation	  Psychiatry: A & O x 3, Mood & affect appropriate  Gastrointestinal:  Soft, Non-tender, + BS	  Skin: No rashes, No ecchymoses, No cyanosis	  Neurologic: Non-focal  Extremities: Normal range of motion, No clubbing, cyanosis or edema  Vascular: Peripheral pulses palpable 2+ bilaterally    MEDICATIONS  (STANDING):  amLODIPine   Tablet 10 milliGRAM(s) Oral daily  aspirin enteric coated 81 milliGRAM(s) Oral daily  atorvastatin 40 milliGRAM(s) Oral at bedtime  budesonide 160 MICROgram(s)/formoterol 4.5 MICROgram(s) Inhaler 2 Puff(s) Inhalation two times a day  dextrose 5%. 1000 milliLiter(s) (50 mL/Hr) IV Continuous <Continuous>  dextrose 50% Injectable 12.5 Gram(s) IV Push once  dextrose 50% Injectable 25 Gram(s) IV Push once  dextrose 50% Injectable 25 Gram(s) IV Push once  enoxaparin Injectable 40 milliGRAM(s) SubCutaneous daily  insulin glargine Injectable (LANTUS) 24 Unit(s) SubCutaneous at bedtime  insulin lispro (HumaLOG) corrective regimen sliding scale   SubCutaneous three times a day before meals  insulin lispro (HumaLOG) corrective regimen sliding scale   SubCutaneous at bedtime  insulin lispro Injectable (HumaLOG) 8 Unit(s) SubCutaneous three times a day before meals  lisinopril 20 milliGRAM(s) Oral two times a day  magnesium oxide 400 milliGRAM(s) Oral two times a day with meals  metoprolol tartrate 50 milliGRAM(s) Oral once      TELEMETRY: 	    ECG:  	  RADIOLOGY:  OTHER: 	  	  LABS:	 	    CARDIAC MARKERS:                                12.3   9.71  )-----------( 227      ( 20 Dec 2019 08:49 )             38.4     12-20    140  |  102  |  16  ----------------------------<  198<H>  3.8   |  26  |  0.66    Ca    9.5      20 Dec 2019 06:36  Mg     1.5     12-20    TPro  7.5  /  Alb  3.8  /  TBili  0.3  /  DBili  x   /  AST  21  /  ALT  31  /  AlkPhos  99  12-18    proBNP: Serum Pro-Brain Natriuretic Peptide: 19 pg/mL (12-16 @ 23:19)    Lipid Profile: Cholesterol 210    HDL 37      HgA1c: Hemoglobin A1C, Whole Blood: 12.3 % (12-05 @ 10:56)  Hemoglobin A1C, Whole Blood: 12.1 % (12-04 @ 18:42)    TSH: Thyroid Stimulating Hormone, Serum: 2.12 uIU/mL (12-19 @ 01:04)  Thyroid Stimulating Hormone, Serum: 4.75 uIU/mL (12-17 @ 04:28)  Thyroid Stimulating Hormone, Serum: 3.410 uIU/mL (12-05 @ 07:25)          Assessment and plan  ---------------------------  49F w/ uncontrolled DM2 (A1c12.3), HTN, MVP, obesity, multinodular thyroid, reactive airway disease (WTC), and recent admit to [facility] for chest pain (d/c 12/6/19) presents to [facility] for evaluation of chest pain. Patient reports that since discharge from [facility] she has continued to have intermittent "zing" shooting pain over left chest and left breast, w/ numbness of left shoulder and left arm, associated sob, occurring at rest, and improving with aspirin. She took ASA 81 chewable x5 at approx 7pm when she had the chest pain and felt the way people have described NSVT to her and became concerned that the patti had some alleviation with aspirin however she felt impending doom which led her to drive from work to the hospital. In her family, her sister and mother at young age are reported to have MI. She reports poor adherence to medication over the last year. Of note w/u as [facility] neg for PE on CTPA, did not identify abnormal NM stress c/w MI, and did not identify significant structural abnormalities on TTE. There was consideration to pursue cardiac catheterization however was deferred. Last multinodular thyroid identified incidentally on last admit w/ TFTs wnl w/ planned outpatient w/u.  pt continued to have both exertional and non exertional chest pain associated with intermittent palpitation, no hx of syncope..  pt was told by other cardiologist to have cath.   pt with sig risk factor for cad, including strong fhx, DM, HTN, Hypercholesteremia obesity, with intermittent palpitation  will consider cardiac ct and check for calcium score  event monitor as out pt   no arrythmia overnight  awaiting cardiac ct  noted  events monitor as out pt  bp is well controlled

## 2020-01-09 ENCOUNTER — APPOINTMENT (OUTPATIENT)
Dept: CARDIOLOGY | Facility: CLINIC | Age: 50
End: 2020-01-09

## 2020-02-02 ENCOUNTER — EMERGENCY (EMERGENCY)
Facility: HOSPITAL | Age: 50
LOS: 1 days | Discharge: HOME | End: 2020-02-02
Attending: EMERGENCY MEDICINE | Admitting: EMERGENCY MEDICINE
Payer: COMMERCIAL

## 2020-02-02 VITALS
OXYGEN SATURATION: 100 % | DIASTOLIC BLOOD PRESSURE: 76 MMHG | HEART RATE: 118 BPM | TEMPERATURE: 99 F | RESPIRATION RATE: 18 BRPM | SYSTOLIC BLOOD PRESSURE: 97 MMHG

## 2020-02-02 VITALS
TEMPERATURE: 98 F | HEART RATE: 82 BPM | SYSTOLIC BLOOD PRESSURE: 139 MMHG | OXYGEN SATURATION: 99 % | DIASTOLIC BLOOD PRESSURE: 89 MMHG | RESPIRATION RATE: 17 BRPM

## 2020-02-02 LAB
ALBUMIN SERPL ELPH-MCNC: 3.9 G/DL — SIGNIFICANT CHANGE UP (ref 3.3–5)
ALP SERPL-CCNC: 101 U/L — SIGNIFICANT CHANGE UP (ref 40–120)
ALT FLD-CCNC: 24 U/L — SIGNIFICANT CHANGE UP (ref 4–33)
ANION GAP SERPL CALC-SCNC: 12 MMO/L — SIGNIFICANT CHANGE UP (ref 7–14)
AST SERPL-CCNC: 17 U/L — SIGNIFICANT CHANGE UP (ref 4–32)
BASE EXCESS BLDV CALC-SCNC: 2.9 MMOL/L — SIGNIFICANT CHANGE UP
BASOPHILS # BLD AUTO: 0.05 K/UL — SIGNIFICANT CHANGE UP (ref 0–0.2)
BASOPHILS NFR BLD AUTO: 0.5 % — SIGNIFICANT CHANGE UP (ref 0–2)
BILIRUB SERPL-MCNC: 0.2 MG/DL — SIGNIFICANT CHANGE UP (ref 0.2–1.2)
BLOOD GAS VENOUS - CREATININE: 0.61 MG/DL — SIGNIFICANT CHANGE UP (ref 0.5–1.3)
BUN SERPL-MCNC: 11 MG/DL — SIGNIFICANT CHANGE UP (ref 7–23)
CALCIUM SERPL-MCNC: 9.4 MG/DL — SIGNIFICANT CHANGE UP (ref 8.4–10.5)
CHLORIDE BLDV-SCNC: 104 MMOL/L — SIGNIFICANT CHANGE UP (ref 96–108)
CHLORIDE SERPL-SCNC: 103 MMOL/L — SIGNIFICANT CHANGE UP (ref 98–107)
CO2 SERPL-SCNC: 26 MMOL/L — SIGNIFICANT CHANGE UP (ref 22–31)
CREAT SERPL-MCNC: 0.59 MG/DL — SIGNIFICANT CHANGE UP (ref 0.5–1.3)
EOSINOPHIL # BLD AUTO: 0.03 K/UL — SIGNIFICANT CHANGE UP (ref 0–0.5)
EOSINOPHIL NFR BLD AUTO: 0.3 % — SIGNIFICANT CHANGE UP (ref 0–6)
GAS PNL BLDV: 139 MMOL/L — SIGNIFICANT CHANGE UP (ref 136–146)
GLUCOSE BLDV-MCNC: 255 MG/DL — HIGH (ref 70–99)
GLUCOSE SERPL-MCNC: 259 MG/DL — HIGH (ref 70–99)
HCO3 BLDV-SCNC: 26 MMOL/L — SIGNIFICANT CHANGE UP (ref 20–27)
HCT VFR BLD CALC: 38.3 % — SIGNIFICANT CHANGE UP (ref 34.5–45)
HCT VFR BLDV CALC: 37.4 % — SIGNIFICANT CHANGE UP (ref 34.5–45)
HGB BLD-MCNC: 12 G/DL — SIGNIFICANT CHANGE UP (ref 11.5–15.5)
HGB BLDV-MCNC: 12.1 G/DL — SIGNIFICANT CHANGE UP (ref 11.5–15.5)
IMM GRANULOCYTES NFR BLD AUTO: 0.3 % — SIGNIFICANT CHANGE UP (ref 0–1.5)
LACTATE BLDV-MCNC: 1.8 MMOL/L — SIGNIFICANT CHANGE UP (ref 0.5–2)
LYMPHOCYTES # BLD AUTO: 2.13 K/UL — SIGNIFICANT CHANGE UP (ref 1–3.3)
LYMPHOCYTES # BLD AUTO: 21.9 % — SIGNIFICANT CHANGE UP (ref 13–44)
MAGNESIUM SERPL-MCNC: 1.7 MG/DL — SIGNIFICANT CHANGE UP (ref 1.6–2.6)
MCHC RBC-ENTMCNC: 28.4 PG — SIGNIFICANT CHANGE UP (ref 27–34)
MCHC RBC-ENTMCNC: 31.3 % — LOW (ref 32–36)
MCV RBC AUTO: 90.5 FL — SIGNIFICANT CHANGE UP (ref 80–100)
MONOCYTES # BLD AUTO: 0.58 K/UL — SIGNIFICANT CHANGE UP (ref 0–0.9)
MONOCYTES NFR BLD AUTO: 6 % — SIGNIFICANT CHANGE UP (ref 2–14)
NEUTROPHILS # BLD AUTO: 6.92 K/UL — SIGNIFICANT CHANGE UP (ref 1.8–7.4)
NEUTROPHILS NFR BLD AUTO: 71 % — SIGNIFICANT CHANGE UP (ref 43–77)
NRBC # FLD: 0 K/UL — SIGNIFICANT CHANGE UP (ref 0–0)
PCO2 BLDV: 51 MMHG — SIGNIFICANT CHANGE UP (ref 41–51)
PH BLDV: 7.36 PH — SIGNIFICANT CHANGE UP (ref 7.32–7.43)
PHOSPHATE SERPL-MCNC: 2.6 MG/DL — SIGNIFICANT CHANGE UP (ref 2.5–4.5)
PLATELET # BLD AUTO: 207 K/UL — SIGNIFICANT CHANGE UP (ref 150–400)
PMV BLD: 10.2 FL — SIGNIFICANT CHANGE UP (ref 7–13)
PO2 BLDV: 39 MMHG — SIGNIFICANT CHANGE UP (ref 35–40)
POTASSIUM BLDV-SCNC: 4.3 MMOL/L — SIGNIFICANT CHANGE UP (ref 3.4–4.5)
POTASSIUM SERPL-MCNC: 4.4 MMOL/L — SIGNIFICANT CHANGE UP (ref 3.5–5.3)
POTASSIUM SERPL-SCNC: 4.4 MMOL/L — SIGNIFICANT CHANGE UP (ref 3.5–5.3)
PROT SERPL-MCNC: 7.3 G/DL — SIGNIFICANT CHANGE UP (ref 6–8.3)
RBC # BLD: 4.23 M/UL — SIGNIFICANT CHANGE UP (ref 3.8–5.2)
RBC # FLD: 14 % — SIGNIFICANT CHANGE UP (ref 10.3–14.5)
SAO2 % BLDV: 68.7 % — SIGNIFICANT CHANGE UP (ref 60–85)
SODIUM SERPL-SCNC: 141 MMOL/L — SIGNIFICANT CHANGE UP (ref 135–145)
TROPONIN T, HIGH SENSITIVITY: < 6 NG/L — SIGNIFICANT CHANGE UP (ref ?–14)
WBC # BLD: 9.74 K/UL — SIGNIFICANT CHANGE UP (ref 3.8–10.5)
WBC # FLD AUTO: 9.74 K/UL — SIGNIFICANT CHANGE UP (ref 3.8–10.5)

## 2020-02-02 PROCEDURE — 99284 EMERGENCY DEPT VISIT MOD MDM: CPT

## 2020-02-02 PROCEDURE — 71046 X-RAY EXAM CHEST 2 VIEWS: CPT | Mod: 26

## 2020-02-02 RX ORDER — IPRATROPIUM/ALBUTEROL SULFATE 18-103MCG
3 AEROSOL WITH ADAPTER (GRAM) INHALATION ONCE
Refills: 0 | Status: COMPLETED | OUTPATIENT
Start: 2020-02-02 | End: 2020-02-02

## 2020-02-02 RX ADMIN — Medication 3 MILLILITER(S): at 14:14

## 2020-02-02 NOTE — ED ADULT NURSE NOTE - NSIMPLEMENTINTERV_GEN_ALL_ED
Implemented All Universal Safety Interventions:  [place] to call system. Call bell, personal items and telephone within reach. Instruct patient to call for assistance. Room bathroom lighting operational. Non-slip footwear when patient is off stretcher. Physically safe environment: no spills, clutter or unnecessary equipment. Stretcher in lowest position, wheels locked, appropriate side rails in place.

## 2020-02-02 NOTE — ED PROVIDER NOTE - ATTENDING CONTRIBUTION TO CARE
gary: hx cut and pasted from [facility] note mid-dec 2019.   49F w/ uncontrolled DM2 (A1c12.3), HTN, MVP, obesity, multinodular thyroid, reactive airway disease (WTC), and recent admit to [facility] for chest pain (d/c 12/6/19) presents to [facility] for evaluation of chest pain. Patient reports that since discharge from [facility] she has continued to have intermittent "zing" shooting pain over left chest and left breast, w/ numbness of left shoulder and left arm, associated sob, occurring at rest, and improving with aspirin. She took ASA 81 chewable x5 at approx 7pm when she had the chest pain and felt the way people have described NSVT to her and became concerned that the pain had some alleviation with aspirin however she felt impending doom which led her to drive from work to the hospital. In her family, her sister and mother at young age are reported to have MI. She reports poor adherence to medication over the last year. Of note w/u as [facility] neg for PE on CTPA, did not identify abnormal NM stress c/w MI, and did not identify significant structural abnormalities on TTE. There was consideration to pursue cardiac catheterization however was deferred. Last multinodular thyroid identified incidentally on last admit w/ TFTs wnl w/ planned outpatient w/u.  pt continued to have both exertional and non exertional chest pain associated with intermittent palpitation, no hx of syncope..  pt was told by other cardiologist to have cath.   pt with sig risk factor for cad, including strong fhx, DM, HTN, Hypercholesteremia obesity, with intermittent palpitation    today:    cardiac ct to check for calcium score - no acute findings  event monitor as out pt gary:   [place] summary from recent hospitalizations noted and today hx obtained from pt at bedside.   Pt had inpt w/u at [facility] for palpitations and atypical CP including in hosp cardiac monitoring as well as CT cardiac. Pt also notes a holter monitor and has a an opt appt with EP to evaluate paliptations in approx 2 weeks (which pt wants sooner).   Today pt notes run of tachycardia with HR timed at 125 . no sycnope. Came  to ED for evaluation.  exam unremarkable. No abnl rhythms noted while on telemetry in ED.  Will try to contact pts cardiologist and if unable will offer pt additional EP specialists for a sooner appt.

## 2020-02-02 NOTE — ED PROVIDER NOTE - PATIENT PORTAL LINK FT
You can access the FollowMyHealth Patient Portal offered by [facility] by registering at the following website: [email or web address]. By joining [employer]’s FollowMyHealth portal, you will also be able to view your health information using other applications (apps) compatible with our system.

## 2020-02-02 NOTE — ED PROVIDER NOTE - NSFOLLOWUPINSTRUCTIONS_ED_ALL_ED_FT
-Take Motrin/Tylenol for pain as needed.  Take Motrin 600 mg (three 200 mg over the counter pills) every 8 hours as needed for moderate pain--take with food. Do not take this medication if you have a bleeding disorder, stomach or GI ulcer problems or liver disease. Take 2 regular strength (650mg) or 1 extra strength (500mg) of Tylenol. If you have any questions please consult a pharmacist or your PMD.   -Follow up with your cardiologist as soon as possible.   -A copy of resulted labs, imaging, and findings have been provided to you.   -As we discussed, please return to the Emergency Department if you experience any new/worsening symptoms, including, but are not limited to: unrelenting nausea, vomiting, chest pain, shortness of breath, dizziness, syncope, or any other concerning symptoms.  -If you have issues obtaining follow up, please call: [phone] to obtain a doctor or specialist who takes your insurance in your area.  You may call [phone] to make an appointment with the internal medicine clinic.

## 2020-02-02 NOTE — ED PROVIDER NOTE - NS ED ROS FT
CONSTITUTIONAL: No weakness, fevers or chills  EYES/ENT: Some congestion noted; No visual changes;  No vertigo or throat pain   NECK: No pain or stiffness  RESPIRATORY: No cough, wheezing, hemoptysis; No shortness of breath  CARDIOVASCULAR: No chest pain or palpitations  GASTROINTESTINAL: No abdominal or epigastric pain. No nausea, vomiting, or hematemesis; No diarrhea or constipation. No melena or hematochezia.  GENITOURINARY: No dysuria, frequency or hematuria  NEUROLOGICAL: No numbness or weakness  SKIN: No itching, rashes

## 2020-02-02 NOTE — ED ADULT NURSE NOTE - OBJECTIVE STATEMENT
Receive pt. in ER room 27 alert and oriented x 4 presenting to the ER with complaints of left side chest pain and heart palpitations. Pt. have a history of DM, HTN, Asthma, Gerd, High cholesterol. Pt. stated " I was driving home from work this morning and my left chest started hurting and I had palpitations". No c/o chest pain at present, pt. experience shortness of breath with Placed on cardiac monitor, will continue to monitor.

## 2020-02-02 NOTE — ED PROVIDER NOTE - CLINICAL SUMMARY MEDICAL DECISION MAKING FREE TEXT BOX
Oxana Sierra MD:  49-year-old woman with a past medical history of asthma, hypertension, hyperlipidemia, and diabetes mellitus with chest pain since AM similar to previous episodes. Pt has had extensive cardiac workup with neg results. Low suspicion for ACS but will get labs, trop, CXR, EKG.

## 2020-02-02 NOTE — ED PROVIDER NOTE - OBJECTIVE STATEMENT
The patient is a 49-year-old woman with a past medical history of asthma, hypertension, hyperlipidemia, and diabetes mellitus who is presenting to the emergency department with a chief complaint of chest pain of one day's duration. Her pain began at about 6 am this morning as she was leaving work. She describes her pain as a stabbing pain in her chest that extends to her breast. It has not been constant, but nothing in particular brings it on or makes it better. It does not radiate. Her pain is not reproducible with palpation. The patient was administered four aspirin in the ambulance, and she has not had her pain since that time. She denies any recent trauma to her chest wall. She denies any shortness of breath or any swelling or erythema in her extremities. She denies any weakness. The patient does have a history of GERD, and she does occasionally have pain with eating, but that pain is quite different from her current pain.

## 2020-02-02 NOTE — ED ADULT TRIAGE NOTE - CHIEF COMPLAINT QUOTE
Patient arrived by EMS, with complaints of left side chest pain this morning at 6:15 am, states palpitations at 8 am, dyspnea on exertion. Hx HTN, HDL, DM, asthma, GERD, trigeminal neurologia.

## 2020-02-02 NOTE — ED PROVIDER NOTE - PROGRESS NOTE DETAILS
Oxana Sierra MD: Neg labs, CXR, EKG. HR NSR. Pt is concerned about intermittent tachycardia, appointment with cardiologist in next 1-2 weeks for possible Holter. Will dc with cards f/u.

## 2020-02-22 ENCOUNTER — EMERGENCY (EMERGENCY)
Facility: HOSPITAL | Age: 50
LOS: 1 days | Discharge: HOME | End: 2020-02-22
Attending: EMERGENCY MEDICINE | Admitting: EMERGENCY MEDICINE
Payer: COMMERCIAL

## 2020-02-22 VITALS
TEMPERATURE: 99 F | DIASTOLIC BLOOD PRESSURE: 96 MMHG | SYSTOLIC BLOOD PRESSURE: 154 MMHG | RESPIRATION RATE: 18 BRPM | HEART RATE: 85 BPM | OXYGEN SATURATION: 100 %

## 2020-02-22 LAB
ALBUMIN SERPL ELPH-MCNC: 3.9 G/DL — SIGNIFICANT CHANGE UP (ref 3.3–5)
ALP SERPL-CCNC: 100 U/L — SIGNIFICANT CHANGE UP (ref 40–120)
ALT FLD-CCNC: 22 U/L — SIGNIFICANT CHANGE UP (ref 4–33)
ANION GAP SERPL CALC-SCNC: 12 MMO/L — SIGNIFICANT CHANGE UP (ref 7–14)
AST SERPL-CCNC: 29 U/L — SIGNIFICANT CHANGE UP (ref 4–32)
BASOPHILS # BLD AUTO: 0.06 K/UL — SIGNIFICANT CHANGE UP (ref 0–0.2)
BASOPHILS NFR BLD AUTO: 0.5 % — SIGNIFICANT CHANGE UP (ref 0–2)
BILIRUB SERPL-MCNC: 0.3 MG/DL — SIGNIFICANT CHANGE UP (ref 0.2–1.2)
BUN SERPL-MCNC: 10 MG/DL — SIGNIFICANT CHANGE UP (ref 7–23)
CALCIUM SERPL-MCNC: 10 MG/DL — SIGNIFICANT CHANGE UP (ref 8.4–10.5)
CHLORIDE SERPL-SCNC: 101 MMOL/L — SIGNIFICANT CHANGE UP (ref 98–107)
CO2 SERPL-SCNC: 24 MMOL/L — SIGNIFICANT CHANGE UP (ref 22–31)
CREAT SERPL-MCNC: 0.54 MG/DL — SIGNIFICANT CHANGE UP (ref 0.5–1.3)
EOSINOPHIL # BLD AUTO: 0.04 K/UL — SIGNIFICANT CHANGE UP (ref 0–0.5)
EOSINOPHIL NFR BLD AUTO: 0.3 % — SIGNIFICANT CHANGE UP (ref 0–6)
GLUCOSE SERPL-MCNC: 199 MG/DL — HIGH (ref 70–99)
HCT VFR BLD CALC: 41 % — SIGNIFICANT CHANGE UP (ref 34.5–45)
HGB BLD-MCNC: 12.8 G/DL — SIGNIFICANT CHANGE UP (ref 11.5–15.5)
IMM GRANULOCYTES NFR BLD AUTO: 0.4 % — SIGNIFICANT CHANGE UP (ref 0–1.5)
LYMPHOCYTES # BLD AUTO: 2.67 K/UL — SIGNIFICANT CHANGE UP (ref 1–3.3)
LYMPHOCYTES # BLD AUTO: 22.6 % — SIGNIFICANT CHANGE UP (ref 13–44)
MAGNESIUM SERPL-MCNC: 1.9 MG/DL — SIGNIFICANT CHANGE UP (ref 1.6–2.6)
MCHC RBC-ENTMCNC: 27.4 PG — SIGNIFICANT CHANGE UP (ref 27–34)
MCHC RBC-ENTMCNC: 31.2 % — LOW (ref 32–36)
MCV RBC AUTO: 87.6 FL — SIGNIFICANT CHANGE UP (ref 80–100)
MONOCYTES # BLD AUTO: 0.62 K/UL — SIGNIFICANT CHANGE UP (ref 0–0.9)
MONOCYTES NFR BLD AUTO: 5.2 % — SIGNIFICANT CHANGE UP (ref 2–14)
NEUTROPHILS # BLD AUTO: 8.4 K/UL — HIGH (ref 1.8–7.4)
NEUTROPHILS NFR BLD AUTO: 71 % — SIGNIFICANT CHANGE UP (ref 43–77)
NRBC # FLD: 0 K/UL — SIGNIFICANT CHANGE UP (ref 0–0)
PHOSPHATE SERPL-MCNC: 2.8 MG/DL — SIGNIFICANT CHANGE UP (ref 2.5–4.5)
PLATELET # BLD AUTO: 261 K/UL — SIGNIFICANT CHANGE UP (ref 150–400)
PMV BLD: 9.9 FL — SIGNIFICANT CHANGE UP (ref 7–13)
POTASSIUM SERPL-MCNC: 4.7 MMOL/L — SIGNIFICANT CHANGE UP (ref 3.5–5.3)
POTASSIUM SERPL-SCNC: 4.7 MMOL/L — SIGNIFICANT CHANGE UP (ref 3.5–5.3)
PROT SERPL-MCNC: 8.2 G/DL — SIGNIFICANT CHANGE UP (ref 6–8.3)
RBC # BLD: 4.68 M/UL — SIGNIFICANT CHANGE UP (ref 3.8–5.2)
RBC # FLD: 13.6 % — SIGNIFICANT CHANGE UP (ref 10.3–14.5)
SODIUM SERPL-SCNC: 137 MMOL/L — SIGNIFICANT CHANGE UP (ref 135–145)
TROPONIN T, HIGH SENSITIVITY: < 6 NG/L — SIGNIFICANT CHANGE UP (ref ?–14)
WBC # BLD: 11.84 K/UL — HIGH (ref 3.8–10.5)
WBC # FLD AUTO: 11.84 K/UL — HIGH (ref 3.8–10.5)

## 2020-02-22 PROCEDURE — 93010 ELECTROCARDIOGRAM REPORT: CPT

## 2020-02-22 PROCEDURE — 99284 EMERGENCY DEPT VISIT MOD MDM: CPT | Mod: 25

## 2020-02-22 PROCEDURE — 71046 X-RAY EXAM CHEST 2 VIEWS: CPT | Mod: 26

## 2020-02-22 NOTE — ED PROVIDER NOTE - CLINICAL SUMMARY MEDICAL DECISION MAKING FREE TEXT BOX
48 yo female with pmhx of recurrent chest pain, HTN, DM, presenting with CP today. Suggestive of atypical angina, will evaluate with cbc cmp trop ekg cxr, will reassess. will speak to EP doctor for further evaluation and recommendations

## 2020-02-22 NOTE — ED PROVIDER NOTE - PROGRESS NOTE DETAILS
Tony Cochran PGY2  trop neg, ekg neg, cxr neg, spoke to dr. vides about results, recommended outpatient follow up, does not recommend cath currently because she had extensive work up including ct coronary. patient has improved sx, will dc with pmd and cards f/u

## 2020-02-22 NOTE — ED PROVIDER NOTE - OBJECTIVE STATEMENT
48 yo female with pmhx of HTN DM GERD asthma presenting with chest pain today. Patient was walking up stairs and began to have midsternal nonradiating CP, currently wearing holter monitor, came to ED for further evaluation. Over past 2 months, patient had 5 hospital visits and 2 admissions for CP and palpitations, where echo neg, nuclear stress test neg, CT coronary neg. Recently placed on holter monitor this week by Dr. Nichole.    Denies SOB, LOC, N/V, fevers, recent travel, sick contacts

## 2020-02-22 NOTE — ED ADULT TRIAGE NOTE - CHIEF COMPLAINT QUOTE
Patient c/o mid sternal chest pain since 11 am today . Patient presently wearing a Halter Monitor for chest pain.

## 2020-02-22 NOTE — ED ADULT NURSE NOTE - OBJECTIVE STATEMENT
pt received to 15, aox3.  pt c/o chest pain x a few days.  pt presents to ED with halter monitor in place.  appears in NAD.  MD at bedside.  pt refusing blood work pt received to 15, aox3.  pt c/o chest pain since this am.  pt presents to ED with halter monitor in place.  appears in NAD.  MD at bedside.  pt refusing blood work and is aggressive towards staff.

## 2020-02-22 NOTE — ED PROVIDER NOTE - ATTENDING CONTRIBUTION TO CARE
Maddy: 48yo female with a h/o DM, HTN, MVP, and chronic chest pain/tachycardia. Over the last 3 months she has had multiple hospital admissions for chest pain- had an unremarkable stress and echo and coronary CT. PT currently has an outpatient holter monitor on. Pt endorses SOB and palpitations that occur with the chest pain. pain is intermittent and nonexertional in nature. NO cough, fevers or chills. No abdominal pain, nausea or vomiting. No LE pain or edema. Exam: GENERAL: well appearing, NAD, HEENT: MMM, PERRLA, CARDIO: +S1/S2, no murmurs, rubs or gallops, LUNGS: CTA B/L, no wheezing, rales or rhonchi, ABD: soft, nontender, BSx4 quadrants, no guarding or rigidity. EXT: No LE edema or calf TTP, 2+ distal pulses x 4 extremities. NEURO: AxOx3, SKIN: no rashes or lesions, well perfused A/P- 48yo female with chest pain and palpitations- EKG unremarkable. will obtain CXR and labs and discuss with cards. Pt also has a history of hypomagnesemia. will check lytes.

## 2020-02-22 NOTE — ED PROVIDER NOTE - NSFOLLOWUPINSTRUCTIONS_ED_ALL_ED_FT
Activities as tolerated. Please encourage good oral and fluid intake. For pain, please take Motrin 400mg every 4 hours as needed, or Tylenol 650mg every 6 hours as needed.    Please see your primary care doctor within 24-48 hours for further management of your symptoms.  Please follow up with your cardiologist in one week for further evaluation of your symptoms.    Please seek emergent medical management if you have any worsening signs or symptoms, such as worsening chest pain, difficulty breathing, loss of consciousness, or persistent vomiting.

## 2020-02-22 NOTE — ED PROVIDER NOTE - PATIENT PORTAL LINK FT
You can access the FollowMyHealth Patient Portal offered by [facility] by registering at the following website: [email or web address]. By joining [employer]’s FollowMyHealth portal, you will also be able to view your health information using other applications (apps) compatible with our system.

## 2020-04-27 ENCOUNTER — APPOINTMENT (OUTPATIENT)
Dept: GASTROENTEROLOGY | Facility: CLINIC | Age: 50
End: 2020-04-27

## 2020-04-27 ENCOUNTER — TRANSCRIPTION ENCOUNTER (OUTPATIENT)
Age: 50
End: 2020-04-27

## 2020-09-13 ENCOUNTER — EMERGENCY (EMERGENCY)
Age: 50
LOS: 1 days | Discharge: HOME | End: 2020-09-13
Attending: STUDENT IN AN ORGANIZED HEALTH CARE EDUCATION/TRAINING PROGRAM | Admitting: STUDENT IN AN ORGANIZED HEALTH CARE EDUCATION/TRAINING PROGRAM
Payer: COMMERCIAL

## 2020-09-13 VITALS
DIASTOLIC BLOOD PRESSURE: 99 MMHG | SYSTOLIC BLOOD PRESSURE: 140 MMHG | TEMPERATURE: 98 F | RESPIRATION RATE: 16 BRPM | HEART RATE: 110 BPM | OXYGEN SATURATION: 99 % | HEIGHT: 65 IN

## 2020-09-13 LAB
ALBUMIN SERPL ELPH-MCNC: 4 G/DL — SIGNIFICANT CHANGE UP (ref 3.3–5)
ALP SERPL-CCNC: 96 U/L — SIGNIFICANT CHANGE UP (ref 40–120)
ALT FLD-CCNC: 25 U/L — SIGNIFICANT CHANGE UP (ref 4–33)
ANION GAP SERPL CALC-SCNC: 14 MMO/L — SIGNIFICANT CHANGE UP (ref 7–14)
AST SERPL-CCNC: 23 U/L — SIGNIFICANT CHANGE UP (ref 4–32)
BASE EXCESS BLDV CALC-SCNC: 3.3 MMOL/L — SIGNIFICANT CHANGE UP
BASOPHILS # BLD AUTO: 0.05 K/UL — SIGNIFICANT CHANGE UP (ref 0–0.2)
BASOPHILS NFR BLD AUTO: 0.4 % — SIGNIFICANT CHANGE UP (ref 0–2)
BILIRUB SERPL-MCNC: 0.2 MG/DL — SIGNIFICANT CHANGE UP (ref 0.2–1.2)
BLOOD GAS VENOUS - CREATININE: 0.71 MG/DL — SIGNIFICANT CHANGE UP (ref 0.5–1.3)
BUN SERPL-MCNC: 10 MG/DL — SIGNIFICANT CHANGE UP (ref 7–23)
CALCIUM SERPL-MCNC: 9.7 MG/DL — SIGNIFICANT CHANGE UP (ref 8.4–10.5)
CHLORIDE BLDV-SCNC: 103 MMOL/L — SIGNIFICANT CHANGE UP (ref 96–108)
CHLORIDE SERPL-SCNC: 101 MMOL/L — SIGNIFICANT CHANGE UP (ref 98–107)
CO2 SERPL-SCNC: 24 MMOL/L — SIGNIFICANT CHANGE UP (ref 22–31)
CREAT SERPL-MCNC: 0.66 MG/DL — SIGNIFICANT CHANGE UP (ref 0.5–1.3)
EOSINOPHIL # BLD AUTO: 0.04 K/UL — SIGNIFICANT CHANGE UP (ref 0–0.5)
EOSINOPHIL NFR BLD AUTO: 0.3 % — SIGNIFICANT CHANGE UP (ref 0–6)
GAS PNL BLDV: 136 MMOL/L — SIGNIFICANT CHANGE UP (ref 136–146)
GLUCOSE BLDV-MCNC: 261 MG/DL — HIGH (ref 70–99)
GLUCOSE SERPL-MCNC: 267 MG/DL — HIGH (ref 70–99)
HCG SERPL-ACNC: < 5 MIU/ML — SIGNIFICANT CHANGE UP
HCO3 BLDV-SCNC: 26 MMOL/L — SIGNIFICANT CHANGE UP (ref 20–27)
HCT VFR BLD CALC: 40.7 % — SIGNIFICANT CHANGE UP (ref 34.5–45)
HCT VFR BLDV CALC: 38.4 % — SIGNIFICANT CHANGE UP (ref 34.5–45)
HGB BLD-MCNC: 12.6 G/DL — SIGNIFICANT CHANGE UP (ref 11.5–15.5)
HGB BLDV-MCNC: 12.5 G/DL — SIGNIFICANT CHANGE UP (ref 11.5–15.5)
IMM GRANULOCYTES NFR BLD AUTO: 0.3 % — SIGNIFICANT CHANGE UP (ref 0–1.5)
LACTATE BLDV-MCNC: 1.8 MMOL/L — SIGNIFICANT CHANGE UP (ref 0.5–2)
LACTATE BLDV-MCNC: 2.3 MMOL/L — HIGH (ref 0.5–2)
LYMPHOCYTES # BLD AUTO: 19.1 % — SIGNIFICANT CHANGE UP (ref 13–44)
LYMPHOCYTES # BLD AUTO: 2.41 K/UL — SIGNIFICANT CHANGE UP (ref 1–3.3)
MAGNESIUM SERPL-MCNC: 1.4 MG/DL — LOW (ref 1.6–2.6)
MCHC RBC-ENTMCNC: 27.7 PG — SIGNIFICANT CHANGE UP (ref 27–34)
MCHC RBC-ENTMCNC: 31 % — LOW (ref 32–36)
MCV RBC AUTO: 89.5 FL — SIGNIFICANT CHANGE UP (ref 80–100)
MONOCYTES # BLD AUTO: 0.72 K/UL — SIGNIFICANT CHANGE UP (ref 0–0.9)
MONOCYTES NFR BLD AUTO: 5.7 % — SIGNIFICANT CHANGE UP (ref 2–14)
NEUTROPHILS # BLD AUTO: 9.35 K/UL — HIGH (ref 1.8–7.4)
NEUTROPHILS NFR BLD AUTO: 74.2 % — SIGNIFICANT CHANGE UP (ref 43–77)
NRBC # FLD: 0 K/UL — SIGNIFICANT CHANGE UP (ref 0–0)
PCO2 BLDV: 51 MMHG — SIGNIFICANT CHANGE UP (ref 41–51)
PH BLDV: 7.36 PH — SIGNIFICANT CHANGE UP (ref 7.32–7.43)
PHOSPHATE SERPL-MCNC: 3 MG/DL — SIGNIFICANT CHANGE UP (ref 2.5–4.5)
PLATELET # BLD AUTO: 265 K/UL — SIGNIFICANT CHANGE UP (ref 150–400)
PMV BLD: 10.4 FL — SIGNIFICANT CHANGE UP (ref 7–13)
PO2 BLDV: 53 MMHG — HIGH (ref 35–40)
POTASSIUM BLDV-SCNC: 3.7 MMOL/L — SIGNIFICANT CHANGE UP (ref 3.4–4.5)
POTASSIUM SERPL-MCNC: 3.9 MMOL/L — SIGNIFICANT CHANGE UP (ref 3.5–5.3)
POTASSIUM SERPL-SCNC: 3.9 MMOL/L — SIGNIFICANT CHANGE UP (ref 3.5–5.3)
PROT SERPL-MCNC: 7.6 G/DL — SIGNIFICANT CHANGE UP (ref 6–8.3)
RBC # BLD: 4.55 M/UL — SIGNIFICANT CHANGE UP (ref 3.8–5.2)
RBC # FLD: 13.5 % — SIGNIFICANT CHANGE UP (ref 10.3–14.5)
SAO2 % BLDV: 85.5 % — HIGH (ref 60–85)
SODIUM SERPL-SCNC: 139 MMOL/L — SIGNIFICANT CHANGE UP (ref 135–145)
TROPONIN T, HIGH SENSITIVITY: < 6 NG/L — SIGNIFICANT CHANGE UP (ref ?–14)
WBC # BLD: 12.61 K/UL — HIGH (ref 3.8–10.5)
WBC # FLD AUTO: 12.61 K/UL — HIGH (ref 3.8–10.5)

## 2020-09-13 PROCEDURE — 71045 X-RAY EXAM CHEST 1 VIEW: CPT | Mod: 26

## 2020-09-13 PROCEDURE — 70450 CT HEAD/BRAIN W/O DYE: CPT | Mod: 26

## 2020-09-13 PROCEDURE — 99284 EMERGENCY DEPT VISIT MOD MDM: CPT | Mod: 25

## 2020-09-13 PROCEDURE — 93010 ELECTROCARDIOGRAM REPORT: CPT

## 2020-09-13 RX ORDER — MAGNESIUM SULFATE 500 MG/ML
2 VIAL (ML) INJECTION ONCE
Refills: 0 | Status: COMPLETED | OUTPATIENT
Start: 2020-09-13 | End: 2020-09-13

## 2020-09-13 RX ORDER — SODIUM CHLORIDE 9 MG/ML
1000 INJECTION INTRAMUSCULAR; INTRAVENOUS; SUBCUTANEOUS ONCE
Refills: 0 | Status: COMPLETED | OUTPATIENT
Start: 2020-09-13 | End: 2020-09-13

## 2020-09-13 RX ORDER — ACETAMINOPHEN 500 MG
650 TABLET ORAL ONCE
Refills: 0 | Status: COMPLETED | OUTPATIENT
Start: 2020-09-13 | End: 2020-09-13

## 2020-09-13 RX ORDER — METOCLOPRAMIDE HCL 10 MG
10 TABLET ORAL ONCE
Refills: 0 | Status: COMPLETED | OUTPATIENT
Start: 2020-09-13 | End: 2020-09-13

## 2020-09-13 RX ORDER — SODIUM CHLORIDE 9 MG/ML
1000 INJECTION, SOLUTION INTRAVENOUS ONCE
Refills: 0 | Status: COMPLETED | OUTPATIENT
Start: 2020-09-13 | End: 2020-09-13

## 2020-09-13 NOTE — ED PROVIDER NOTE - PATIENT PORTAL LINK FT
You can access the FollowMyHealth Patient Portal offered by [facility] by registering at the following website: [email or web address]. By joining [employer]’s FollowMyHealth portal, you will also be able to view your health information using other applications (apps) compatible with our system.

## 2020-09-13 NOTE — ED ADULT TRIAGE NOTE - CHIEF COMPLAINT QUOTE
Pt c/o a headache that started yesterday. States her BP has been elevated as well. PMH: DM2, HTN Pt c/o a headache that started yesterday. States her BP has been elevated as well. PMH: DM2, HTN .

## 2020-09-13 NOTE — ED PROVIDER NOTE - CLINICAL SUMMARY MEDICAL DECISION MAKING FREE TEXT BOX
49 y/o F with PMH HTN, GERD, DM, HLD, Trigeminal neuralgia, MVP, PCOS, tachycardia of unclear etiology followed by cardiology p/w left sided headache. pt w/ normal neuro exam . headache which began yesterday after crying and chest pain. possible migraine vs tension headache. chest pain w/ heart score of 2-3 low suspcion for acs. will obtain ekg, cxr, cbc, trop, mag, phosp, ct head, reglan, IVF, tylenol reasses . neurology f/u

## 2020-09-13 NOTE — ED PROVIDER NOTE - OBJECTIVE STATEMENT
50F PMH asthma presents with headache. Pt states she has been dealing with a ton of stress at home (kids, co-worker had stroke) and for past 2 days has been crying a lot states. 50F PMH DM, HLD, HTN asthma presents with headache. Pt states she has been dealing with a lot of stress at home (kids, co-worker had stroke) and for past 2 days has been crying almost constantly. States that for past 2 days she has had a pain around her L eye that radiates backwards through her head. States it is worse after crying. Also complaining of a sharp L sided CP that has been going on for multiple months. Non-exertional. Pt also states she is having a L arm "feeling" Unable to clarify. States not numb, weak, or pain, no paresthesias, states description closest to heavy.

## 2020-09-13 NOTE — ED PROVIDER NOTE - PHYSICAL EXAMINATION
General: Well developed, well nourished  HEENT: Normocephalic and atraumatic, EOMI, Trachea midline.   Cardiac: Normal S1 and S2 w/ RRR. No MRG.  Pulmonary: CTA bilaterally. No increased WOB.   Abdominal: Soft, NTND  Neurologic: CN II-XII intact. Muscle strength 5/5 in all extremities. tandem gait nl   Musculoskeletal: No limited ROM.  Vascular: No pitting edema. Warm and well perfused  Skin: Color appropriate for race.   Psychiatric: Appropriate mood and affect. No apparent risk to self or others.  Soy Mcgraw M.D. PGY-3

## 2020-09-13 NOTE — ED ADULT NURSE NOTE - NSIMPLEMENTINTERV_GEN_ALL_ED
Implemented All Universal Safety Interventions:  [place] to call system. Call bell, personal items and telephone within reach. Instruct patient to call for assistance. Room bathroom lighting operational. Non-slip footwear when patient is off stretcher. Physically safe environment: no spills, clutter or unnecessary equipment. Stretcher in lowest position, wheels locked, appropriate side rails in place.

## 2020-09-13 NOTE — ED PROVIDER NOTE - ATTENDING CONTRIBUTION TO CARE
51 y/o F with PMH HTN, GERD, DM, HLD, Trigeminal neuralgia, MVP, PCOS, tachycardia of unclear etiology followed by cardiology p/w left sided headache. pt reports having intermittent headache since yesterday pt states she got in an argument at home and was crying and began having a left sided headache. headache is left sided 7/10 accompanied by some shoulder pain. She states it was accompanied by chest pain left sided w/ radiation to her arm. She deneis SOB, syncope, weakness, nausea, vomiting, dyruria, head trauma. She states she has not had fever. She states when she was crying she also had some lip numbness which resolved.   denies fever, chills, +chest pain, SOB, abdominal pain, diarrhea, dysuria, syncope, bleeding, new rash,weakness, numbness, blurred vision , + headache  ROS  otherwise negative as per HPI  Gen: Awake, Alert, WD, WN, NAD  Head:  NC/AT  Eyes:  PERRL, EOMI, Conjunctiva pink, lids normal, no scleral icterus  ENT: OP clear, no exudates, no erythema, uvula midline, TMs clear bilaterally, moist mucus membranes  Neck: supple, nontender, no meningismus, no JVD, trachea midline  Cardiac/CV:  S1 S2, RRR, no M/G/R  Respiratory/Pulm:  CTAB, good air movement, normal resp effort, no wheezes/stridor/retractions/rales/rhonchi  Gastrointestinal/Abdomen:  Soft, obese, nontender, nondistended, +BS, no rebound/guarding  Back:  no CVAT, no MLT  Ext:  warm, well perfused, moving all extremities spontaneously, no peripheral edema, distal pulses intact  Skin: intact, no rash  Neuro:  AAOx3, sensation intact, motor 5/5 x 4 extremities, normal gait, speech clear, cn II-XII normal, normal coordination   MDM as above

## 2020-09-13 NOTE — ED PROVIDER NOTE - NSFOLLOWUPINSTRUCTIONS_ED_ALL_ED_FT
-Follow up with a neurologist in 24-48 hours.   -A copy of resulted labs, imaging, and findings have been provided to you.   -As we discussed, please return to the Emergency Department if you experience any new/worsening symptoms, including, but are not limited to: unrelenting nausea, vomiting, chest pain, shortness of breath, dizziness, syncope, headache that does not resolve, numbness or tingling, loss of sensation, loss of motor function, or any other concerning symptoms.  -If you have issues obtaining follow up, please call: [phone] to obtain a doctor or specialist who takes your insurance in your area.  You may call [phone] to make an appointment with the internal medicine clinic.

## 2020-09-13 NOTE — ED PROVIDER NOTE - NS ED ROS FT
REVIEW OF SYSTEMS:  General: no fever, no chills  HEENT: +headache, no vision changes  Cardiac: +chest pain, no palpitations  Respiratory: no cough, no shortness of breath  Gastrointestinal: no abdominal pain, no nausea, no vomiting, no diarrhea  Genitourinary: no hematuria, no dysuria, no urinary frequency  Extremities: no extremity swelling, no extremity pain  Neuro: no focal weakness, no numbness/tingling of the extremities, no decreased sensation  Heme: no easy bleeding, no easy bruising  Skin: no jaundice,  no rashes, no lesions  All other ROS as documented in HPI  -Soy Mgcraw, PGY-3

## 2020-09-14 VITALS
RESPIRATION RATE: 16 BRPM | OXYGEN SATURATION: 100 % | SYSTOLIC BLOOD PRESSURE: 149 MMHG | DIASTOLIC BLOOD PRESSURE: 90 MMHG | TEMPERATURE: 98 F | HEART RATE: 90 BPM

## 2020-09-17 ENCOUNTER — APPOINTMENT (OUTPATIENT)
Dept: CARDIOLOGY | Facility: CLINIC | Age: 50
End: 2020-09-17
Payer: COMMERCIAL

## 2020-09-17 VITALS
OXYGEN SATURATION: 97 % | DIASTOLIC BLOOD PRESSURE: 82 MMHG | WEIGHT: 257 LBS | SYSTOLIC BLOOD PRESSURE: 132 MMHG | HEIGHT: 65 IN | BODY MASS INDEX: 42.82 KG/M2 | HEART RATE: 97 BPM | TEMPERATURE: 98.7 F

## 2020-09-17 PROCEDURE — 93000 ELECTROCARDIOGRAM COMPLETE: CPT

## 2020-09-21 ENCOUNTER — APPOINTMENT (OUTPATIENT)
Dept: CARDIOLOGY | Facility: CLINIC | Age: 50
End: 2020-09-21
Payer: COMMERCIAL

## 2020-09-21 DIAGNOSIS — R07.89 OTHER CHEST PAIN: ICD-10-CM

## 2020-09-21 PROCEDURE — 0296T: CPT

## 2020-09-21 PROCEDURE — 0298T: CPT

## 2020-10-08 DIAGNOSIS — Z87.19 PERSONAL HISTORY OF OTHER DISEASES OF THE DIGESTIVE SYSTEM: ICD-10-CM

## 2020-10-08 DIAGNOSIS — Z86.79 PERSONAL HISTORY OF OTHER DISEASES OF THE CIRCULATORY SYSTEM: ICD-10-CM

## 2020-10-08 DIAGNOSIS — I51.7 CARDIOMEGALY: ICD-10-CM

## 2020-10-08 DIAGNOSIS — Z87.42 PERSONAL HISTORY OF OTHER DISEASES OF THE FEMALE GENITAL TRACT: ICD-10-CM

## 2020-10-08 DIAGNOSIS — Z86.39 PERSONAL HISTORY OF OTHER ENDOCRINE, NUTRITIONAL AND METABOLIC DISEASE: ICD-10-CM

## 2020-10-08 DIAGNOSIS — Z87.828 PERSONAL HISTORY OF OTHER (HEALED) PHYSICAL INJURY AND TRAUMA: ICD-10-CM

## 2020-10-08 RX ORDER — MAGNESIUM OXIDE 400 MG
400 (241.3 MG) TABLET ORAL TWICE DAILY
Refills: 0 | Status: ACTIVE | COMMUNITY

## 2020-10-08 RX ORDER — LISINOPRIL 20 MG/1
20 TABLET ORAL
Refills: 0 | Status: DISCONTINUED | COMMUNITY
End: 2020-10-08

## 2020-10-13 ENCOUNTER — APPOINTMENT (OUTPATIENT)
Dept: CARDIOLOGY | Facility: CLINIC | Age: 50
End: 2020-10-13

## 2020-11-03 NOTE — DISCUSSION/SUMMARY
[FreeTextEntry1] : Plan to f/u with pulm for DELVIN eval.\par Holter monitor\par f/u post sleep study and Holter

## 2020-11-03 NOTE — PHYSICAL EXAM
[General Appearance - Well Developed] : well developed [Normal Appearance] : normal appearance [Well Groomed] : well groomed [General Appearance - Well Nourished] : well nourished [No Deformities] : no deformities [General Appearance - In No Acute Distress] : no acute distress [Normal Conjunctiva] : the conjunctiva exhibited no abnormalities [Eyelids - No Xanthelasma] : the eyelids demonstrated no xanthelasmas [Normal Oral Mucosa] : normal oral mucosa [No Oral Pallor] : no oral pallor [No Oral Cyanosis] : no oral cyanosis [Normal Jugular Venous A Waves Present] : normal jugular venous A waves present [Normal Jugular Venous V Waves Present] : normal jugular venous V waves present [No Jugular Venous Jean A Waves] : no jugular venous jean A waves [Normal Rate] : normal [Normal S1] : normal S1 [Normal S2] : normal S2 [No Murmur] : no murmurs heard [2+] : left 2+ [No Abnormalities] : the abdominal aorta was not enlarged and no bruit was heard [No Pitting Edema] : no pitting edema present [Normal Rhythm/Effort] : normal respiratory rhythm and effort [Clear Bilaterally] : the lungs were clear to auscultation bilaterally [Normal to Percussion] : the lungs were normal to percussion [Normal] : palpation of the chest was normal [Abdomen Soft] : soft [Abdomen Tenderness] : non-tender [Abdomen Mass (___ Cm)] : no abdominal mass palpated [Abnormal Walk] : normal gait [Gait - Sufficient For Exercise Testing] : the gait was sufficient for exercise testing [Nail Clubbing] : no clubbing of the fingernails [Cyanosis, Localized] : no localized cyanosis [Petechial Hemorrhages (___cm)] : no petechial hemorrhages [Skin Color & Pigmentation] : normal skin color and pigmentation [] : no rash [No Venous Stasis] : no venous stasis [Skin Lesions] : no skin lesions [No Skin Ulcers] : no skin ulcer [No Xanthoma] : no  xanthoma was observed [Oriented To Time, Place, And Person] : oriented to person, place, and time [Affect] : the affect was normal [Mood] : the mood was normal [No Anxiety] : not feeling anxious [S3] : no S3 [S4] : no S4 [Right Carotid Bruit] : no bruit heard over the right carotid [Left Carotid Bruit] : no bruit heard over the left carotid [Right Femoral Bruit] : no bruit heard over the right femoral artery [Left Femoral Bruit] : no bruit heard over the left femoral artery

## 2020-11-03 NOTE — HISTORY OF PRESENT ILLNESS
[FreeTextEntry1] : 49yo lady with a PMH of HTN, HL, DM, reactive airway disease, LBP secondary to multiple herniated discs and trigeminal neuralgia who presents the ED for chest pain.  Patient reports for the past three days she has had stinging chest pain substernally and underneath her L breast.  Patient reports that episodes last less than 30 seconds and denies associated symptoms like palpitations, dyspnea, lightheadedness dizziness, and syncope.  Patient also reports that during one of the episodes she took 3 aspirin which significantly improved her symptoms.  Patient states that she is non-compliant with her medications for HTN and DM, took herself off all her meds ~1yr ago.  Had labs done three days ago and was told her A1c was 12.8.  Patient has no other complaints.  \par Angiogram a few years ago with non-obstructive CAD as per pt (at [facility]).\par Very strong FH of CAD in her family, mother s/p MIs; brother with BKA and sister blind from uncontrolled DM\par Trop neg x3\par \par EKG Repeated, unchanged from before \par 2D echo with normal EF, no significant valvular lesions noted, mild pHTN\par stress test normal \par Offered cardiac cath as pt is still with c/o occasional chest discomfort, pt prefers not to have cardiac cath, instead pt is requesting to be seen by GI for an endoscopy and pulm chely

## 2020-11-24 ENCOUNTER — APPOINTMENT (OUTPATIENT)
Dept: SLEEP CENTER | Facility: CLINIC | Age: 50
End: 2020-11-24

## 2021-04-11 NOTE — ED ADULT NURSE NOTE - NSFALLRSKASSESSTYPE_ED_ALL_ED
Pt arrived from home having contractions q2-5 mins per her report. Arrived to 2052. Able to talk through most contractions. SVE 2 cm, 60-70%, -2 station. Going to sit on birthing ball and will recheck.    Initial (On Arrival)

## 2021-08-11 NOTE — DIETITIAN INITIAL EVALUATION ADULT. - DIET TYPE
[facility]/consistent carbohydrate (evening snack)/DASH/TLC (sodium and cholesterol restricted diet)

## 2021-10-18 ENCOUNTER — APPOINTMENT (OUTPATIENT)
Dept: ORTHOPEDIC SURGERY | Facility: CLINIC | Age: 51
End: 2021-10-18

## 2021-10-25 ENCOUNTER — NON-APPOINTMENT (OUTPATIENT)
Age: 51
End: 2021-10-25

## 2021-10-25 ENCOUNTER — APPOINTMENT (OUTPATIENT)
Dept: ORTHOPEDIC SURGERY | Facility: CLINIC | Age: 51
End: 2021-10-25
Payer: COMMERCIAL

## 2021-10-25 VITALS
DIASTOLIC BLOOD PRESSURE: 82 MMHG | BODY MASS INDEX: 42.82 KG/M2 | WEIGHT: 257 LBS | HEIGHT: 65 IN | SYSTOLIC BLOOD PRESSURE: 138 MMHG | HEART RATE: 120 BPM

## 2021-10-25 PROCEDURE — 72100 X-RAY EXAM L-S SPINE 2/3 VWS: CPT

## 2021-10-25 PROCEDURE — 99204 OFFICE O/P NEW MOD 45 MIN: CPT

## 2021-10-25 RX ORDER — INSULIN LISPRO 100 [IU]/ML
100 INJECTION, SOLUTION INTRAVENOUS; SUBCUTANEOUS
Qty: 9 | Refills: 0 | Status: ACTIVE | COMMUNITY
Start: 2021-04-08

## 2021-10-25 RX ORDER — PEN NEEDLE, DIABETIC 31 G X1/4"
32G X 4 MM NEEDLE, DISPOSABLE MISCELLANEOUS
Qty: 100 | Refills: 0 | Status: ACTIVE | COMMUNITY
Start: 2021-04-22

## 2021-10-25 NOTE — HISTORY OF PRESENT ILLNESS
[de-identified] : Patient presents to office c/o lower back pain over the years, worse over the last 8 months. Patient works for the [employer] and notes progressively increasing pain in her day-to-day activities on the job such as walking, running, etc. She describes a constant aching lower back pain with an intermittent sharp shooting pain down her right leg to her right foot. She also notes "pins and needles" in her foot. She has tried conservative treatment such as rest, ice/heat, OTC NSAIDs, Tramadol, and pain management where she has received epidural injections, not receiving much relief with any treatment. She has been considered for discectomy for her persistent symptoms, but due to her obesity, diabetes, and other medical risks, she has not had this done.\par Pt denies fever, chills, weight changes, loss of bladder control, bowel incontinence or urinary retention or saddle anesthesia\par The patient's past medical history, past surgical history, medications, allergies, and social history were reviewed by me today with the patient and documented accordingly. In addition, the patient's family history, which is noncontributory to this visit, was also reviewed.\par

## 2021-10-25 NOTE — PHYSICAL EXAM
[de-identified] : Lumbar ROM:\par NTTP L spine and b/l paraspinals lumbar region\par Skin intact L spine. No rashes, ulcers, blisters. \par No lymphedema. \par Rapid alternating movements- intact\par Finger to nose testing- intact\par Full and non-painful ROM RUE, LUE, RLE and LLE. Skin intact RUE, LUE, RLE and LLE. No rashes, blisters, ulcers. NTTP RUE, LUE, RLE and LLE. No evidence of dislocation or subluxation B/L upper and lower extremities.\par  [de-identified] : MRI Lumbar Spine Eleanor DOS 3/3/21:\par \par Disc herniation at L5-S1 with impingement of the descending right S1 nerve root.\par \par 2 views AP and Lat of Lumbar Spine from [id number] . Read and dictated by Dr. Reddy Mcelroy Board Certified Orthopaedic surgeon\par \par

## 2021-10-25 NOTE — DISCUSSION/SUMMARY
[de-identified] : 52 yo female with L5S1 HNP/Lateral recess stenosis, failed PT, NSAIDS, and KARLEE, recommend posterior lumbar laminectomy and discectomy L5S1. Updated MR prior to surgery. \par \par I reviewed all major risks, benefits, and complications associated with lumbar laminectomy and/or microdiscectomy including but not limited to bleeding, infection, CSF leak, neurological injury, chronic pain, reherniation, hematoma worsening of pain, anesthetic risk, chronic pain and disability, need for further surgical intervention, medical complications (GI, , DVT, PE, cardiac, pulmonary, etc) and risk of mortality.\par \par Diagnosis, prognosis, natural history and treatment was discussed with patient. Patient was advised if the following symptoms develop: chills, fever, \par loss of bladder control, bowel incontinence or urinary retention, numbness/tingling or weakness is present in upper or lower extremities, to go to the nearest emergency room. This may be a new clinical condition not present at the time of the patient visit  that may lead to paralysis and/or death, Patient advised if the above symptoms developed to also call the office immediately to inform us and to go to the nearest emergency room.\par \par

## 2021-11-22 ENCOUNTER — APPOINTMENT (OUTPATIENT)
Dept: ORTHOPEDIC SURGERY | Facility: CLINIC | Age: 51
End: 2021-11-22

## 2021-11-29 ENCOUNTER — APPOINTMENT (OUTPATIENT)
Dept: ORTHOPEDIC SURGERY | Facility: CLINIC | Age: 51
End: 2021-11-29

## 2021-12-06 ENCOUNTER — APPOINTMENT (OUTPATIENT)
Dept: ORTHOPEDIC SURGERY | Facility: CLINIC | Age: 51
End: 2021-12-06

## 2021-12-06 NOTE — H&P ADULT - NSICDXFAMILYHX_GEN_ALL_CORE_FT
[Well Developed] : well developed [Well Nourished] : well nourished [No Acute Distress] : no acute distress [Normal Conjunctiva] : normal conjunctiva [Normal Venous Pressure] : normal venous pressure [No Carotid Bruit] : no carotid bruit [Normal S1, S2] : normal S1, S2 [No Murmur] : no murmur [No Rub] : no rub [No Gallop] : no gallop [Clear Lung Fields] : clear lung fields [Good Air Entry] : good air entry [No Respiratory Distress] : no respiratory distress  [Soft] : abdomen soft [Non Tender] : non-tender [No Masses/organomegaly] : no masses/organomegaly [Normal Bowel Sounds] : normal bowel sounds [Normal Gait] : normal gait [No Edema] : no edema [No Cyanosis] : no cyanosis [No Clubbing] : no clubbing [No Varicosities] : no varicosities [No Rash] : no rash [No Skin Lesions] : no skin lesions [Moves all extremities] : moves all extremities [No Focal Deficits] : no focal deficits [Normal Speech] : normal speech [Alert and Oriented] : alert and oriented [Normal memory] : normal memory FAMILY HISTORY:  No pertinent family history in first degree relatives

## 2021-12-08 NOTE — ED ADULT NURSE NOTE - CHIEF COMPLAINT QUOTE
Shortness of breath on exertion , coughing x 1.5 months , progressively worse , fever , headaches ,   on and off chills , night sweat.   h/o reactive airways
Continue Regimen: epiceream
Render In Strict Bullet Format?: No
Detail Level: Zone

## 2022-01-07 ENCOUNTER — APPOINTMENT (OUTPATIENT)
Dept: ORTHOPEDIC SURGERY | Facility: CLINIC | Age: 52
End: 2022-01-07

## 2022-01-24 ENCOUNTER — APPOINTMENT (OUTPATIENT)
Dept: ORTHOPEDIC SURGERY | Facility: CLINIC | Age: 52
End: 2022-01-24

## 2022-02-01 ENCOUNTER — FORM ENCOUNTER (OUTPATIENT)
Age: 52
End: 2022-02-01

## 2022-02-07 ENCOUNTER — APPOINTMENT (OUTPATIENT)
Dept: ORTHOPEDIC SURGERY | Facility: CLINIC | Age: 52
End: 2022-02-07
Payer: COMMERCIAL

## 2022-02-07 DIAGNOSIS — M51.36 OTHER INTERVERTEBRAL DISC DEGENERATION, LUMBAR REGION: ICD-10-CM

## 2022-02-07 DIAGNOSIS — M51.26 OTHER INTERVERTEBRAL DISC DISPLACEMENT, LUMBAR REGION: ICD-10-CM

## 2022-02-07 PROCEDURE — 99215 OFFICE O/P EST HI 40 MIN: CPT

## 2022-02-07 RX ORDER — GABAPENTIN 600 MG/1
600 TABLET, COATED ORAL 3 TIMES DAILY
Qty: 90 | Refills: 1 | Status: ACTIVE | COMMUNITY
Start: 2022-02-07 | End: 1900-01-01

## 2022-02-07 RX ORDER — IBUPROFEN 800 MG/1
800 TABLET, FILM COATED ORAL 3 TIMES DAILY
Qty: 90 | Refills: 1 | Status: ACTIVE | COMMUNITY
Start: 2022-02-07 | End: 1900-01-01

## 2022-02-15 ENCOUNTER — APPOINTMENT (OUTPATIENT)
Dept: CARDIOLOGY | Facility: CLINIC | Age: 52
End: 2022-02-15
Payer: COMMERCIAL

## 2022-02-15 ENCOUNTER — NON-APPOINTMENT (OUTPATIENT)
Age: 52
End: 2022-02-15

## 2022-02-15 VITALS
HEART RATE: 86 BPM | WEIGHT: 284 LBS | BODY MASS INDEX: 47.32 KG/M2 | HEIGHT: 65 IN | DIASTOLIC BLOOD PRESSURE: 86 MMHG | OXYGEN SATURATION: 99 % | SYSTOLIC BLOOD PRESSURE: 130 MMHG

## 2022-02-15 DIAGNOSIS — I10 ESSENTIAL (PRIMARY) HYPERTENSION: ICD-10-CM

## 2022-02-15 DIAGNOSIS — R00.2 PALPITATIONS: ICD-10-CM

## 2022-02-15 PROCEDURE — 93000 ELECTROCARDIOGRAM COMPLETE: CPT

## 2022-02-15 PROCEDURE — 99214 OFFICE O/P EST MOD 30 MIN: CPT

## 2022-02-21 NOTE — H&P ADULT - ASSESSMENT
49 F noncompliant with DM and HTN meds presents with substernal and L sided CP.  Pain improved after aspirin.  Due to risk factors, will place in cardiac obs.  EKG - NSR with LVH.      IMPROVE VTE Individual Risk Assessment          RISK                                                          Points  [  ] Previous VTE                                                3  [  ] Thrombophilia                                             2  [  ] Lower limb paralysis                                    2        (unable to hold up >15 seconds)    [  ] Current Cancer                                             2         (within 6 months)  [  ] Immobilization > 24 hrs                              1  [  ] ICU/CCU stay > 24 hours                            1  [  ] Age > 60                                                    1    IMPROVE VTE Score - 0 Veronika Nation RN

## 2022-02-23 ENCOUNTER — APPOINTMENT (OUTPATIENT)
Dept: CARDIOLOGY | Facility: CLINIC | Age: 52
End: 2022-02-23
Payer: COMMERCIAL

## 2022-02-23 ENCOUNTER — MED ADMIN CHARGE (OUTPATIENT)
Age: 52
End: 2022-02-23

## 2022-02-23 PROBLEM — R00.2 PALPITATIONS: Status: ACTIVE | Noted: 2020-09-21

## 2022-02-23 PROBLEM — I10 HYPERTENSION, ESSENTIAL: Status: ACTIVE | Noted: 2020-11-03

## 2022-02-23 PROCEDURE — A9500: CPT

## 2022-02-23 PROCEDURE — 78451 HT MUSCLE IMAGE SPECT SING: CPT

## 2022-02-23 PROCEDURE — 93015 CV STRESS TEST SUPVJ I&R: CPT

## 2022-02-23 RX ORDER — REGADENOSON 0.08 MG/ML
0.4 INJECTION, SOLUTION INTRAVENOUS
Qty: 1 | Refills: 0 | Status: COMPLETED | OUTPATIENT
Start: 2022-02-23

## 2022-02-23 RX ADMIN — REGADENOSON 4 MG/5ML: 0.08 INJECTION, SOLUTION INTRAVENOUS at 00:00

## 2022-02-23 NOTE — DISCUSSION/SUMMARY
[FreeTextEntry1] : Persistent chest discomfort.\par EKG sinus 74\par unable to walk for treadmill study 2/2 low-back pain and multiple herniated disks.\par -2D echo\par -nuc stress test

## 2022-02-23 NOTE — HISTORY OF PRESENT ILLNESS
[FreeTextEntry1] : 51yo lady with a PMH of HTN, HL, DM, reactive airway disease, LBP secondary to multiple herniated discs and trigeminal neuralgia who presents the ED for chest pain.  Patient reports for the past three days she has had stinging chest pain substernally and underneath her L breast.  Patient reports that episodes last less than 30 seconds and denies associated symptoms like palpitations, dyspnea, lightheadedness dizziness, and syncope.  Patient also reports that during one of the episodes she took 3 aspirin which significantly improved her symptoms.  Patient states that she is non-compliant with her medications for HTN and DM, took herself off all her meds ~1yr ago.  Had labs done three days ago and was told her A1c was 12.8.  Patient has no other complaints.  \par Angiogram a few years ago with non-obstructive CAD as per pt (at [facility]par Very strong FH of CAD in her family, mother s/p MIs; brother with BKA and sister blind from uncontrolled DM\par Trop neg x3\par \par EKG Repeated, unchanged from before \par 2D echo with normal EF, no significant valvular lesions noted, mild pHTN\par stress test normal \par Offered cardiac cath as pt is still with c/o occasional chest discomfort, pt prefers not to have cardiac cath, instead pt is requesting to be seen by GI for an endoscopy and pulm eval\par \par 2/15/22:\par Here for followup after being lost 2/2 COVID\par Presenting with episodes of chest discomfort that are different from prior.\par EKG sinus 74bpm

## 2022-02-28 ENCOUNTER — APPOINTMENT (OUTPATIENT)
Dept: CARDIOLOGY | Facility: CLINIC | Age: 52
End: 2022-02-28

## 2022-03-11 ENCOUNTER — APPOINTMENT (OUTPATIENT)
Dept: CARDIOLOGY | Facility: CLINIC | Age: 52
End: 2022-03-11

## 2022-03-15 ENCOUNTER — APPOINTMENT (OUTPATIENT)
Dept: CARDIOLOGY | Facility: CLINIC | Age: 52
End: 2022-03-15

## 2022-03-16 ENCOUNTER — APPOINTMENT (OUTPATIENT)
Dept: CARDIOLOGY | Facility: CLINIC | Age: 52
End: 2022-03-16
Payer: COMMERCIAL

## 2022-03-16 PROCEDURE — 93306 TTE W/DOPPLER COMPLETE: CPT

## 2022-05-18 NOTE — ED ADULT NURSE NOTE - BREATHING, MLM
Spontaneous, unlabored and symmetrical
Detail Level: Detailed
Quality 226: Preventive Care And Screening: Tobacco Use: Screening And Cessation Intervention: Patient screened for tobacco use and is an ex/non-smoker
Quality 130: Documentation Of Current Medications In The Medical Record: Current Medications Documented
Quality 431: Preventive Care And Screening: Unhealthy Alcohol Use - Screening: Patient identified as an unhealthy alcohol user when screened for unhealthy alcohol use using a systematic screening method and received brief counseling

## 2023-01-16 ENCOUNTER — APPOINTMENT (OUTPATIENT)
Dept: CARDIOLOGY | Facility: CLINIC | Age: 53
End: 2023-01-16

## 2023-01-26 ENCOUNTER — INPATIENT (INPATIENT)
Facility: HOSPITAL | Age: 53
LOS: 4 days | Discharge: HOME | End: 2023-01-31
Attending: STUDENT IN AN ORGANIZED HEALTH CARE EDUCATION/TRAINING PROGRAM | Admitting: STUDENT IN AN ORGANIZED HEALTH CARE EDUCATION/TRAINING PROGRAM
Payer: COMMERCIAL

## 2023-01-26 VITALS
TEMPERATURE: 99 F | DIASTOLIC BLOOD PRESSURE: 103 MMHG | RESPIRATION RATE: 20 BRPM | OXYGEN SATURATION: 100 % | SYSTOLIC BLOOD PRESSURE: 157 MMHG | HEART RATE: 103 BPM

## 2023-01-26 DIAGNOSIS — U07.1 COVID-19: ICD-10-CM

## 2023-01-26 LAB
ALBUMIN SERPL ELPH-MCNC: 3.7 G/DL — SIGNIFICANT CHANGE UP (ref 3.3–5)
ALP SERPL-CCNC: 135 U/L — HIGH (ref 40–120)
ALT FLD-CCNC: 19 U/L — SIGNIFICANT CHANGE UP (ref 4–33)
ANION GAP SERPL CALC-SCNC: 11 MMOL/L — SIGNIFICANT CHANGE UP (ref 7–14)
APPEARANCE UR: CLEAR — SIGNIFICANT CHANGE UP
AST SERPL-CCNC: 26 U/L — SIGNIFICANT CHANGE UP (ref 4–32)
BACTERIA # UR AUTO: NEGATIVE — SIGNIFICANT CHANGE UP
BASE EXCESS BLDV CALC-SCNC: 2.6 MMOL/L — SIGNIFICANT CHANGE UP (ref -2–3)
BASOPHILS # BLD AUTO: 0.04 K/UL — SIGNIFICANT CHANGE UP (ref 0–0.2)
BASOPHILS NFR BLD AUTO: 0.6 % — SIGNIFICANT CHANGE UP (ref 0–2)
BILIRUB SERPL-MCNC: <0.2 MG/DL — SIGNIFICANT CHANGE UP (ref 0.2–1.2)
BILIRUB UR-MCNC: NEGATIVE — SIGNIFICANT CHANGE UP
BLOOD GAS VENOUS COMPREHENSIVE RESULT: SIGNIFICANT CHANGE UP
BUN SERPL-MCNC: 14 MG/DL — SIGNIFICANT CHANGE UP (ref 7–23)
CALCIUM SERPL-MCNC: 9.8 MG/DL — SIGNIFICANT CHANGE UP (ref 8.4–10.5)
CHLORIDE BLDV-SCNC: 100 MMOL/L — SIGNIFICANT CHANGE UP (ref 96–108)
CHLORIDE SERPL-SCNC: 98 MMOL/L — SIGNIFICANT CHANGE UP (ref 98–107)
CO2 BLDV-SCNC: 32 MMOL/L — HIGH (ref 22–26)
CO2 SERPL-SCNC: 26 MMOL/L — SIGNIFICANT CHANGE UP (ref 22–31)
COLOR SPEC: YELLOW — SIGNIFICANT CHANGE UP
CREAT SERPL-MCNC: 0.64 MG/DL — SIGNIFICANT CHANGE UP (ref 0.5–1.3)
D DIMER BLD IA.RAPID-MCNC: 224 NG/ML DDU — SIGNIFICANT CHANGE UP
DIFF PNL FLD: NEGATIVE — SIGNIFICANT CHANGE UP
EGFR: 105 ML/MIN/1.73M2 — SIGNIFICANT CHANGE UP
EOSINOPHIL # BLD AUTO: 0.03 K/UL — SIGNIFICANT CHANGE UP (ref 0–0.5)
EOSINOPHIL NFR BLD AUTO: 0.4 % — SIGNIFICANT CHANGE UP (ref 0–6)
EPI CELLS # UR: 1 /HPF — SIGNIFICANT CHANGE UP (ref 0–5)
FLUAV AG NPH QL: SIGNIFICANT CHANGE UP
FLUBV AG NPH QL: SIGNIFICANT CHANGE UP
GAS PNL BLDV: 133 MMOL/L — LOW (ref 136–145)
GAS PNL BLDV: SIGNIFICANT CHANGE UP
GLUCOSE BLDV-MCNC: 251 MG/DL — HIGH (ref 70–99)
GLUCOSE SERPL-MCNC: 246 MG/DL — HIGH (ref 70–99)
GLUCOSE UR QL: ABNORMAL
HCO3 BLDV-SCNC: 30 MMOL/L — HIGH (ref 22–29)
HCT VFR BLD CALC: 40.3 % — SIGNIFICANT CHANGE UP (ref 34.5–45)
HCT VFR BLDA CALC: 38 % — SIGNIFICANT CHANGE UP (ref 34.5–46.5)
HGB BLD CALC-MCNC: 12.5 G/DL — SIGNIFICANT CHANGE UP (ref 11.5–15.5)
HGB BLD-MCNC: 12.5 G/DL — SIGNIFICANT CHANGE UP (ref 11.5–15.5)
HYALINE CASTS # UR AUTO: 0 /LPF — SIGNIFICANT CHANGE UP (ref 0–7)
IANC: 4.05 K/UL — SIGNIFICANT CHANGE UP (ref 1.8–7.4)
IMM GRANULOCYTES NFR BLD AUTO: 0.7 % — SIGNIFICANT CHANGE UP (ref 0–0.9)
KETONES UR-MCNC: NEGATIVE — SIGNIFICANT CHANGE UP
LACTATE BLDV-MCNC: 1.8 MMOL/L — SIGNIFICANT CHANGE UP (ref 0.5–2)
LEUKOCYTE ESTERASE UR-ACNC: NEGATIVE — SIGNIFICANT CHANGE UP
LYMPHOCYTES # BLD AUTO: 2.12 K/UL — SIGNIFICANT CHANGE UP (ref 1–3.3)
LYMPHOCYTES # BLD AUTO: 29.9 % — SIGNIFICANT CHANGE UP (ref 13–44)
MCHC RBC-ENTMCNC: 26.8 PG — LOW (ref 27–34)
MCHC RBC-ENTMCNC: 31 GM/DL — LOW (ref 32–36)
MCV RBC AUTO: 86.3 FL — SIGNIFICANT CHANGE UP (ref 80–100)
MONOCYTES # BLD AUTO: 0.8 K/UL — SIGNIFICANT CHANGE UP (ref 0–0.9)
MONOCYTES NFR BLD AUTO: 11.3 % — SIGNIFICANT CHANGE UP (ref 2–14)
NEUTROPHILS # BLD AUTO: 4.05 K/UL — SIGNIFICANT CHANGE UP (ref 1.8–7.4)
NEUTROPHILS NFR BLD AUTO: 57.1 % — SIGNIFICANT CHANGE UP (ref 43–77)
NITRITE UR-MCNC: NEGATIVE — SIGNIFICANT CHANGE UP
NRBC # BLD: 0 /100 WBCS — SIGNIFICANT CHANGE UP (ref 0–0)
NRBC # FLD: 0 K/UL — SIGNIFICANT CHANGE UP (ref 0–0)
NT-PROBNP SERPL-SCNC: 69 PG/ML — SIGNIFICANT CHANGE UP
PCO2 BLDV: 60 MMHG — HIGH (ref 39–42)
PH BLDV: 7.31 — LOW (ref 7.32–7.43)
PH UR: 5.5 — SIGNIFICANT CHANGE UP (ref 5–8)
PLATELET # BLD AUTO: 220 K/UL — SIGNIFICANT CHANGE UP (ref 150–400)
PO2 BLDV: 37 MMHG — SIGNIFICANT CHANGE UP
POTASSIUM BLDV-SCNC: 3.9 MMOL/L — SIGNIFICANT CHANGE UP (ref 3.5–5.1)
POTASSIUM SERPL-MCNC: 4.5 MMOL/L — SIGNIFICANT CHANGE UP (ref 3.5–5.3)
POTASSIUM SERPL-SCNC: 4.5 MMOL/L — SIGNIFICANT CHANGE UP (ref 3.5–5.3)
PROT SERPL-MCNC: 7.8 G/DL — SIGNIFICANT CHANGE UP (ref 6–8.3)
PROT UR-MCNC: ABNORMAL
RBC # BLD: 4.67 M/UL — SIGNIFICANT CHANGE UP (ref 3.8–5.2)
RBC # FLD: 13.4 % — SIGNIFICANT CHANGE UP (ref 10.3–14.5)
RBC CASTS # UR COMP ASSIST: 2 /HPF — SIGNIFICANT CHANGE UP (ref 0–4)
RSV RNA NPH QL NAA+NON-PROBE: SIGNIFICANT CHANGE UP
SAO2 % BLDV: 56.2 % — SIGNIFICANT CHANGE UP
SARS-COV-2 RNA SPEC QL NAA+PROBE: DETECTED
SODIUM SERPL-SCNC: 135 MMOL/L — SIGNIFICANT CHANGE UP (ref 135–145)
SP GR SPEC: 1.02 — SIGNIFICANT CHANGE UP (ref 1.01–1.05)
TROPONIN T, HIGH SENSITIVITY RESULT: 7 NG/L — SIGNIFICANT CHANGE UP
UROBILINOGEN FLD QL: SIGNIFICANT CHANGE UP
WBC # BLD: 7.09 K/UL — SIGNIFICANT CHANGE UP (ref 3.8–10.5)
WBC # FLD AUTO: 7.09 K/UL — SIGNIFICANT CHANGE UP (ref 3.8–10.5)
WBC UR QL: 1 /HPF — SIGNIFICANT CHANGE UP (ref 0–5)

## 2023-01-26 PROCEDURE — 71046 X-RAY EXAM CHEST 2 VIEWS: CPT | Mod: 26

## 2023-01-26 PROCEDURE — 99223 1ST HOSP IP/OBS HIGH 75: CPT

## 2023-01-26 PROCEDURE — 99285 EMERGENCY DEPT VISIT HI MDM: CPT

## 2023-01-26 RX ORDER — INSULIN LISPRO 100/ML
VIAL (ML) SUBCUTANEOUS AT BEDTIME
Refills: 0 | Status: DISCONTINUED | OUTPATIENT
Start: 2023-01-26 | End: 2023-01-26

## 2023-01-26 RX ORDER — SODIUM CHLORIDE 9 MG/ML
1000 INJECTION INTRAMUSCULAR; INTRAVENOUS; SUBCUTANEOUS ONCE
Refills: 0 | Status: COMPLETED | OUTPATIENT
Start: 2023-01-26 | End: 2023-01-26

## 2023-01-26 RX ORDER — INSULIN LISPRO 100/ML
10 VIAL (ML) SUBCUTANEOUS
Refills: 0 | Status: DISCONTINUED | OUTPATIENT
Start: 2023-01-26 | End: 2023-01-27

## 2023-01-26 RX ORDER — SODIUM CHLORIDE 9 MG/ML
1000 INJECTION, SOLUTION INTRAVENOUS
Refills: 0 | Status: DISCONTINUED | OUTPATIENT
Start: 2023-01-26 | End: 2023-01-31

## 2023-01-26 RX ORDER — SODIUM CHLORIDE 9 MG/ML
1000 INJECTION INTRAMUSCULAR; INTRAVENOUS; SUBCUTANEOUS
Refills: 0 | Status: DISCONTINUED | OUTPATIENT
Start: 2023-01-26 | End: 2023-01-31

## 2023-01-26 RX ORDER — GLUCAGON INJECTION, SOLUTION 0.5 MG/.1ML
1 INJECTION, SOLUTION SUBCUTANEOUS ONCE
Refills: 0 | Status: DISCONTINUED | OUTPATIENT
Start: 2023-01-26 | End: 2023-01-26

## 2023-01-26 RX ORDER — DEXTROSE 50 % IN WATER 50 %
25 SYRINGE (ML) INTRAVENOUS ONCE
Refills: 0 | Status: DISCONTINUED | OUTPATIENT
Start: 2023-01-26 | End: 2023-01-31

## 2023-01-26 RX ORDER — DEXTROSE 50 % IN WATER 50 %
25 SYRINGE (ML) INTRAVENOUS ONCE
Refills: 0 | Status: DISCONTINUED | OUTPATIENT
Start: 2023-01-26 | End: 2023-01-26

## 2023-01-26 RX ORDER — INSULIN GLARGINE 100 [IU]/ML
28 INJECTION, SOLUTION SUBCUTANEOUS AT BEDTIME
Refills: 0 | Status: DISCONTINUED | OUTPATIENT
Start: 2023-01-27 | End: 2023-01-29

## 2023-01-26 RX ORDER — INSULIN LISPRO 100/ML
VIAL (ML) SUBCUTANEOUS
Refills: 0 | Status: DISCONTINUED | OUTPATIENT
Start: 2023-01-26 | End: 2023-01-31

## 2023-01-26 RX ORDER — DEXTROSE 50 % IN WATER 50 %
15 SYRINGE (ML) INTRAVENOUS ONCE
Refills: 0 | Status: DISCONTINUED | OUTPATIENT
Start: 2023-01-26 | End: 2023-01-26

## 2023-01-26 RX ORDER — IPRATROPIUM/ALBUTEROL SULFATE 18-103MCG
3 AEROSOL WITH ADAPTER (GRAM) INHALATION ONCE
Refills: 0 | Status: COMPLETED | OUTPATIENT
Start: 2023-01-26 | End: 2023-01-26

## 2023-01-26 RX ORDER — INSULIN GLARGINE 100 [IU]/ML
24 INJECTION, SOLUTION SUBCUTANEOUS ONCE
Refills: 0 | Status: COMPLETED | OUTPATIENT
Start: 2023-01-26 | End: 2023-01-26

## 2023-01-26 RX ORDER — SODIUM CHLORIDE 9 MG/ML
1000 INJECTION, SOLUTION INTRAVENOUS
Refills: 0 | Status: DISCONTINUED | OUTPATIENT
Start: 2023-01-26 | End: 2023-01-26

## 2023-01-26 RX ORDER — SODIUM CHLORIDE 9 MG/ML
1000 INJECTION INTRAMUSCULAR; INTRAVENOUS; SUBCUTANEOUS
Refills: 0 | Status: DISCONTINUED | OUTPATIENT
Start: 2023-01-26 | End: 2023-01-26

## 2023-01-26 RX ORDER — DEXTROSE 50 % IN WATER 50 %
12.5 SYRINGE (ML) INTRAVENOUS ONCE
Refills: 0 | Status: DISCONTINUED | OUTPATIENT
Start: 2023-01-26 | End: 2023-01-31

## 2023-01-26 RX ORDER — DEXTROSE 50 % IN WATER 50 %
12.5 SYRINGE (ML) INTRAVENOUS ONCE
Refills: 0 | Status: DISCONTINUED | OUTPATIENT
Start: 2023-01-26 | End: 2023-01-26

## 2023-01-26 RX ORDER — INSULIN LISPRO 100/ML
VIAL (ML) SUBCUTANEOUS
Refills: 0 | Status: DISCONTINUED | OUTPATIENT
Start: 2023-01-26 | End: 2023-01-26

## 2023-01-26 RX ORDER — MAGNESIUM SULFATE 500 MG/ML
2 VIAL (ML) INJECTION ONCE
Refills: 0 | Status: COMPLETED | OUTPATIENT
Start: 2023-01-26 | End: 2023-01-26

## 2023-01-26 RX ORDER — DEXTROSE 50 % IN WATER 50 %
15 SYRINGE (ML) INTRAVENOUS ONCE
Refills: 0 | Status: DISCONTINUED | OUTPATIENT
Start: 2023-01-26 | End: 2023-01-31

## 2023-01-26 RX ORDER — INSULIN LISPRO 100/ML
VIAL (ML) SUBCUTANEOUS AT BEDTIME
Refills: 0 | Status: DISCONTINUED | OUTPATIENT
Start: 2023-01-26 | End: 2023-01-31

## 2023-01-26 RX ORDER — ACETAMINOPHEN 500 MG
650 TABLET ORAL EVERY 6 HOURS
Refills: 0 | Status: DISCONTINUED | OUTPATIENT
Start: 2023-01-26 | End: 2023-01-31

## 2023-01-26 RX ORDER — GLUCAGON INJECTION, SOLUTION 0.5 MG/.1ML
1 INJECTION, SOLUTION SUBCUTANEOUS ONCE
Refills: 0 | Status: DISCONTINUED | OUTPATIENT
Start: 2023-01-26 | End: 2023-01-31

## 2023-01-26 RX ADMIN — Medication 125 MILLIGRAM(S): at 16:28

## 2023-01-26 RX ADMIN — SODIUM CHLORIDE 500 MILLILITER(S): 9 INJECTION INTRAMUSCULAR; INTRAVENOUS; SUBCUTANEOUS at 22:12

## 2023-01-26 RX ADMIN — Medication 3 MILLILITER(S): at 16:29

## 2023-01-26 RX ADMIN — Medication 3: at 22:37

## 2023-01-26 NOTE — ED PROVIDER NOTE - NS ED ATTENDING STATEMENT MOD
This was a shared visit with the ZAYDA. I reviewed and verified the documentation and independently performed the documented: Attending with
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
## 2023-01-26 NOTE — H&P ADULT - PROBLEM SELECTOR PLAN 6
- patient unable to names/dosages of all medications  - Med-Hx Pharmacist e-mailed; please follow-up and complete Med-Rec

## 2023-01-26 NOTE — H&P ADULT - NSHPLABSRESULTS_GEN_ALL_CORE
LAB-WORK/STUDIES:                          12.5   7.09  )-----------( 220      ( 2023 16:28 )             40.3     2023 16:28    135    |  98     |  14     ----------------------------<  246    4.5     |  26     |  0.64     Ca    9.8        2023 16:28    TPro  7.8    /  Alb  3.7    /  TBili  <0.2   /  DBili  x      /  AST       /  ALT  19     /  AlkPhos  135    2023 16:28    LIVER FUNCTIONS - ( 2023 16:28 )  Alb: 3.7 g/dL / Pro: 7.8 g/dL / ALK PHOS: 135 U/L / ALT: 19 U/L / AST: 26 U/L / GGT: x           CAPILLARY BLOOD GLUCOSE    POCT Blood Glucose.: 297 mg/dL (2023 14:05)        Urinalysis Basic - ( 2023 19:34 )    Color: Yellow / Appearance: Clear / S.019 / pH: x  Gluc: x / Ketone: Negative  / Bili: Negative / Urobili: <2 mg/dL   Blood: x / Protein: 30 mg/dL / Nitrite: Negative   Leuk Esterase: Negative / RBC: 2 /HPF / WBC 1 /HPF   Sq Epi: x / Non Sq Epi: 1 /HPF / Bacteria: Negative    ========================================================================        ========================================================================

## 2023-01-26 NOTE — H&P ADULT - PROBLEM SELECTOR PROBLEM 4
Prophylactic measure Uncontrolled type 2 diabetes mellitus with hyperglycemia, with long-term current use of insulin

## 2023-01-26 NOTE — H&P ADULT - PROBLEM SELECTOR PLAN 3
- sharp and somewhat piercing  - likely pleuritic, in the setting of lung infection, coughing  - troponin = 7, Pro-BNP = 69 (may be falsely low, in the setting of obesity)  - CXR w/o any acute findings - including effusions, congestive changes (personally reviewed)  - f/u AM lab-work  - f/u TTE (ordered)

## 2023-01-26 NOTE — ED PROVIDER NOTE - PROGRESS NOTE DETAILS
Bunny Nixon, PGY-3: Pt reexamined at bedside, resting comfortably, vitals stable. CXR clear.  Attempted to ambulatory sat however patient can walk for more than 30 seconds without becoming too tired tachypneic, sats lowest 93.  Discussed with patient possible discharge however she feels unsafe at home and feels her shortness of breath is too pronounced.  Will admit to the hospital, endorsed to hospitalist who requests D-dimer.

## 2023-01-26 NOTE — ED ADULT NURSE NOTE - OBJECTIVE STATEMENT
Break RN: Pt is a 53 y/o Female, A&Ox4, ambulatory with a hx of DM, HTN, HLD, and asthma. Pt presents to the ED with c/o SOB and cough x 3 days. Pt states she tested positive for COVID in urgent care and had a sick contact with a positive COVID family member 3 days ago. Pt denies chest pain, fever, chills, abdominal pain, N/V/D or any urinary symptoms. Neuro/sensory intact. Respirations even and unlabored. Pt on 2L NC for comfort, SpO2 at 100%. Pt states SOB worse on exertion. Chest rise equal b/l. Abdomen soft, nontender, nondistended. Skin warm, dry and intact. 20g IVL placed in LAC. Labs collected and sent. Medications administered as ordered, See MAR. VS as noted in flow sheets. Safety maintained throughout.

## 2023-01-26 NOTE — H&P ADULT - PROBLEM SELECTOR PLAN 4
- glucose = 246 on CMP.  Glucosuria of 200.  FS to 456  - reports being diagnosed with Syndrome X in the past  - given Admelog 3 units in the ED  - prescribed additional 6 units, as well as Lantus 24 units tonight  - prescription for Lantus 28 units HS, Admelog 10 units TID and m-ISS, per FS in effect (modify therapy, as needed, especially since patient on steroid presently)  - consistent carb diet  - f/u A1c in the AM; if elevated, please call Endocrinology consult - glucose = 246 on CMP.  Glucosuria of 200.  FS to 456  - reports being diagnosed with Syndrome X in the past  - reports being on long acting insulin (pt unable to recall which) 28 units HS and has self-increased meal-time insulin from 6 units to 11 units (has not been able to follow-up with OP endocrinologist)  - given Admelog 3 units in the ED  - prescribed additional 6 units, as well as Lantus 24 units tonight  - prescription for Lantus 28 units HS, Admelog 15 units TID and m-ISS, per FS in effect (modify therapy, as needed, especially since patient on steroid presently)  - consistent carb diet  - f/u A1c in the AM; if elevated, please call Endocrinology consult

## 2023-01-26 NOTE — H&P ADULT - PROBLEM SELECTOR PLAN 2
- persistent shortness of breath x 3 to 4 days, not ameliorated w/ home bronchodilator therapies  - d-dimer = 224, trop = 7, lactate = 1.8  - CXR w/o any acute findings (personally reviewed)  - maintaining good O2 sat on room air; supplemental oxygen via nasal cannula, as needed  - given Solu-medrol and duo-neb in the ED (continuing, as above)  - Tylenol PRN fever, mild pain  - ensure optimal hydration  - HOB elevation  - f/u AM lab-work  - supportive care, as needed

## 2023-01-26 NOTE — H&P ADULT - ASSESSMENT
[  ]  Lab studies personally reviewed  [  ]  Radiology personally reviewed  [  ]  Old records personally reviewed    52 year old female, with past history significant for Reactive airway disease, Pneumonia (multiple), Type-2 DM, SBO, GERD, Herniated disc, Hypothyroidism, HTN, HLD, LVHG, Mitral valve prolapse, Obesity, and PCOS, presented to the ED secondary to shortness of breath.  Diagnosed with 2019 Novel Coronavirus Disease in the ED. [ x ]  Lab studies personally reviewed  [ x ]  Radiology personally reviewed  [ x ]  Old records personally reviewed    52 year old female, with past history significant for Reactive airway disease, Pneumonia (multiple), Type-2 DM, SBO, GERD, Herniated disc, Hypothyroidism, HTN, HLD, LVHG, Mitral valve prolapse, Obesity, and PCOS, presented to the ED secondary to shortness of breath.  Diagnosed with 2019 Novel Coronavirus Disease in the ED. [ x ]  Lab studies personally reviewed; has respiratory acidosis  [ x ]  Radiology personally reviewed; CXR w/o any acute findings - including effusions, congestive changes (personally reviewed)  [ x ]  Old records personally reviewed    52 year old female, with past history significant for Reactive airway disease, Pneumonia (multiple), Type-2 DM, SBO, GERD, Herniated disc, Hypothyroidism, HTN, HLD, LVH, Mitral valve prolapse, Obesity, and PCOS, presented to the ED secondary to shortness of breath.  Diagnosed with 2019 Novel Coronavirus Disease in the ED.

## 2023-01-26 NOTE — H&P ADULT - NSHPSOCIALHISTORY_GEN_ALL_CORE
SOCIAL HISTORY:    Marital Status:  (  )    (  ) Single        (  )        (  )        (  )   Lives with:         (  ) Alone       (  ) Spouse     (  ) Children         (  ) Parents              (  ) Other    No history of smoking  No history of alcohol abuse  No history of illegal drug use    Occupation:

## 2023-01-26 NOTE — ED PROVIDER NOTE - CLINICAL SUMMARY MEDICAL DECISION MAKING FREE TEXT BOX
52-year-old female history of hypertension hyperlipidemia, obesity, and asthma with shortness of breath in the setting of COVID-19.  Patient currently well-appearing, not hypoxic, no signs of DVT, not tachycardic.  Clear lungs on exam.  No fever at presentation, nonproductive cough.  Low suspicion for pneumonia or PE.  Will defer CTA.  Chest x-ray to evaluate for pneumonia, check lites and cardiac enzymes given intermittent chest pain, however has had multiple cardiac cath and stress test over the past 2 to 3 years which have been negative.  Likely discharge home after blood work and imaging however will reassess periodically

## 2023-01-26 NOTE — ED ADULT TRIAGE NOTE - CHIEF COMPLAINT QUOTE
Pt arrives ambulatory to triage c/o SOB and CP since Tuesday. Pt sent from urgent care, tested + for covid. RAMON noted, 3L NC placed. PMH: reactive airway disease, left ventricular hypertrophy, mitral valve prolapse, obesity, DM2, HLD, HTN, hypothyroid.

## 2023-01-26 NOTE — H&P ADULT - PROBLEM SELECTOR PLAN 7
- reports taking Oxcarbazepine and gabapentin, but unable to recall dosages  - please f/u w/ Med-Hx Pharmacist for dosages

## 2023-01-26 NOTE — H&P ADULT - HISTORY OF PRESENT ILLNESS
52 year old female, with past history significant for Reactive airway disease, Pneumonia (multiple), Type-2 DM, SBO, GERD, Herniated disc, Hypothyroidism, HTN, HLD, LVHG, Mitral valve prolapse, Obesity, and PCOS, presented to the ED secondary to shortness of breath.  Seen and evaluated at bedside;    Vital signs upon ED presentation as follows: BP = 157/103, HR = 103, RR = 20, T = 37.2 C (99 F), O2 Sat = 100% on RA.  Diagnosed with 2019 Novel Coronavirus Disease and prescribed duo-ne x one and Solu-medrol 125 mg in the ED.     52 year old female, with past history significant for Reactive airway disease (sequela of being [place]), Pneumonia (multiple), Type-2 DM, SBO, GERD, Herniated disc, Hypothyroidism, HTN, HLD, LVHG, Mitral valve prolapse, Obesity, and PCOS, presented to the ED secondary to shortness of breath.  Seen and evaluated at bedside; somewhat ill appearing, but in NAD.  Patient endorses shortness of breath for the past ~ 3 days, onset shortly after interacting with her grandchildren who were coughing and ill.  On Tuesday, patient had fever of 102F along with non-productive cough, chills and body aches.  Feels fatigued.  Reports also non-bloody diarrhea and frequency in micturition.  Relates sharp pain of the left posterior shoulder area and the left chest, intermittently.  Went to an urgent care center and was prescribed albuterol and Proventil.  Possibility also of Paxlovid was discussed.  However, duet o signs/symptoms and past history of recurrent pneumonia, patient was sent to the ED for further evaluation.    Vital signs upon ED presentation as follows: BP = 157/103, HR = 103, RR = 20, T = 37.2 C (99 F), O2 Sat = 100% on RA.  Diagnosed with 2019 Novel Coronavirus Disease and prescribed duo-ne x one and Solu-medrol 125 mg in the ED.     52 year old female, with past history significant for Reactive airway disease (sequela of being [place]), Pneumonia (multiple), Type-2 DM, SBO, GERD, Herniated disc, Hypothyroidism, HTN, HLD, LVH, Mitral valve prolapse, Obesity, and PCOS, presented to the ED secondary to shortness of breath.  Seen and evaluated at bedside; somewhat ill appearing, but in NAD.  Patient endorses shortness of breath for the past ~ 3 days, onset shortly after interacting with her grandchildren who were coughing and ill.  On Tuesday, patient had fever of 102F along with non-productive cough, chills and body aches.  Feels fatigued.  Reports also non-bloody diarrhea and frequency in micturition.  Relates sharp pain of the left posterior shoulder area and the left chest, intermittently.  Went to an urgent care center and was prescribed albuterol and Proventil.  Possibility also of Paxlovid was discussed.  However, duet o signs/symptoms and past history of recurrent pneumonia, patient was sent to the ED for further evaluation.    Vital signs upon ED presentation as follows: BP = 157/103, HR = 103, RR = 20, T = 37.2 C (99 F), O2 Sat = 100% on RA.  Diagnosed with 2019 Novel Coronavirus Disease and prescribed duo-ne x one and Solu-medrol 125 mg in the ED.

## 2023-01-26 NOTE — ED PROVIDER NOTE - ATTENDING APP SHARED VISIT CONTRIBUTION OF CARE
Ryland Meeks DO:  patient seen and evaluated with the PA.  I was present for key portions of the History & Physical, and I agree with the Impression & Plan. 53 yo f pmh  DM, HLD, HTN asthma, pw .  Reports has been ill for the last 3 days, including malaise, F/C, dry cough, SOB, exertional.  Today was diagnosed with COVID.  Seen in urgent care.  Advised to ED due to tachypnea.  Patient reports history of RAD, states whenever she gets a URI may develop into a pneumonia.  Denies recent travel.  Patient arrives HDS, well-appearing, mild tachypnea.  Do not suspect PE, aortic cancer.  Do not suspect ACS.  Likely COVID and URI.  Given tachypnea and SOB will treat, will require amatory pulse ox and reassessment.

## 2023-01-26 NOTE — H&P ADULT - NSICDXFAMILYHX_GEN_ALL_CORE_FT
FAMILY HISTORY:  Family history of early CAD     FAMILY HISTORY:  Family history of early CAD    Child  Still living? Unknown  Family history of gallstones, Age at diagnosis: Age Unknown

## 2023-01-26 NOTE — H&P ADULT - NSHPREVIEWOFSYSTEMS_GEN_ALL_CORE
REVIEW OF SYSTEMS:    CONSTITUTIONAL: Generalized weakness/fatigue.  (+) fever and chills  EYES/ENT: No visual changes.  No dysphagia  NECK: No pain or stiffness  RESPIRATORY: Frequent dry coughing.  No hemoptysis.  (+) shortness of breath  CARDIOVASCULAR: L-chest pain w/ some association to coughing.  No palpitations.  No lower extremity edema  GASTROINTESTINAL: No epigastric or abdominal pain. (+) non-bloody diarrhea - occurring spontaneously during urination as well.  No nausea, vomiting or hematemesis.  No constipation. No melena or hematochezia.  GENITOURINARY: Frequency in micturition.  No dysuria or hematuria  MUSCULOSKELETAL: No joint pain, swelling, decreased ROM, erythema, warmth  NEUROLOGICAL: No numbness or weakness  PSYCHIATRY: No anxiety, or depression.  SKIN: No itching, burning, rashes, or lesions   All other review of systems is negative unless indicated above.

## 2023-01-26 NOTE — H&P ADULT - PROBLEM SELECTOR PLAN 5
- chronic issue and is on magnesium up to 1000 mg daily, per patient  - magnesium currently 1.4; supplemented w/ 2 grams magnesium sulfate  - f/u AM lab-work for resolution  - prescribing also magnesium oxide 500 mg BID

## 2023-01-26 NOTE — ED ADULT NURSE NOTE - NSIMPLEMENTINTERV_GEN_ALL_ED
Implemented All Universal Safety Interventions:  [place] to call system. Call bell, personal items and telephone within reach. Instruct patient to call for assistance. Room bathroom lighting operational. Non-slip footwear when patient is off stretcher. Physically safe environment: no spills, clutter or unnecessary equipment. Stretcher in lowest position, wheels locked, appropriate side rails in place.

## 2023-01-26 NOTE — ED PROVIDER NOTE - OBJECTIVE STATEMENT
53yo F pmh HTN/HLD, T2DM, Obesity, Asthma -  Presents to emergency department with 3 days of SOB, intermittent chest pain, nonproductive cough.  Patient from urgent care today, COVID-positive at that time with clear chest x-ray.  Patient reports symptoms started after son was diagnosed COVID-positive 3 days ago, since that time has had intermittent fevers that are responsive to Tylenol, last Tylenol was taken last night.  No lower extremity swelling, no history of blood clots no hemoptysis.  Patient reports multiple episodes of pneumonia in the past, a lot of pneumonia and post viral pneumonia.

## 2023-01-26 NOTE — ED PROVIDER NOTE - PHYSICAL EXAMINATION
GENERAL: non-toxic appearing, alert, in NAD  HEENT: atraumatic, normocephalic, Vision grossly intact, no conjunctivitis or discharge, hearing grossly intact,  no nasal discharge, epistaxis   CARDIAC: RRR, normal S1S2,  no appreciable murmurs, no cyanosis, cap refill < 2 seconds  PULM: tachypneic, oxygen saturation on RA wnl, CTAB, no crackles, rales, rhonchi, or wheezing  GI: abdomen nondistended, soft, nontender, no guarding or rebound tenderness, no palpable masses  NEURO: awake and alert, follows commands, normal speech, PERRLA, EOMI, no focal motor or sensory deficits  MSK: spine appears normal, no joint swelling or erythema, ranging all extremities with no appreciable loss of ROM  EXT: no peripheral edema, calf tenderness, redness or swelling  SKIN: warm, dry, and intact, no rashes  PSYCH: appropriate mood and affect

## 2023-01-26 NOTE — H&P ADULT - PROBLEM SELECTOR PLAN 8
- unclear location, but is sequela of [place] exposure, per patient  - patient thinks last measurement was ~ 9mm (unsure)  - has not been able to follow-up  with scans Q3 to Q6 months for the past ~ one year  - would obtain outpatient records  - repeat CT (likely as outpatient)  - Pulmonology consult

## 2023-01-26 NOTE — H&P ADULT - NSHPPHYSICALEXAM_GEN_ALL_CORE
Vital Signs Last 24 Hrs  T(C): 36.7 (26 Jan 2023 20:00), Max: 37.2 (26 Jan 2023 14:01)  T(F): 98 (26 Jan 2023 20:00), Max: 99 (26 Jan 2023 14:01)  HR: 79 (26 Jan 2023 20:00) (79 - 103)  BP: 118/82 (26 Jan 2023 20:00) (118/82 - 161/101)  BP(mean): --  RR: 18 (26 Jan 2023 20:00) (18 - 20)  SpO2: 99% (26 Jan 2023 20:00) (94% - 100%)    Parameters below as of 26 Jan 2023 17:42  Patient On (Oxygen Delivery Method): room air    =========================================================================================== Vital Signs Last 24 Hrs  T(C): 36.7 (26 Jan 2023 20:00), Max: 37.2 (26 Jan 2023 14:01)  T(F): 98 (26 Jan 2023 20:00), Max: 99 (26 Jan 2023 14:01)  HR: 79 (26 Jan 2023 20:00) (79 - 103)  BP: 118/82 (26 Jan 2023 20:00) (118/82 - 161/101)  BP(mean): --  RR: 18 (26 Jan 2023 20:00) (18 - 20)  SpO2: 99% (26 Jan 2023 20:00) (94% - 100%)    Parameters below as of 26 Jan 2023 17:42  Patient On (Oxygen Delivery Method): room air    ===========================================================================================    PHYSICAL EXAMINATION:    APPEARANCE: Adequately groomed, overweight female, lying propped up in stretcher in NAD  HEENT: Dry oral mucosa.  Pupils reactive to light.  EOMI  LYMPHATIC: No lymphadenopathy appreciated  CARDIOVASCULAR: (+) S1 S2.  No JVD.  No murmurs.  No edema  RESPIRATORY: No wheezing, rhonchi, crackles appreciated  GASTROINTESTINAL: Soft.  Non-tender.  (+) BS  GENITOURINARY: No suprapubic tenderness.  No CVA tenderness B/L  EXTREMITIES: Normal range of motion.  No clubbing, cyanosis or edema  MUSCULOSKELETAL: No atrophy.  No asymmetry.  Good ROM  SKIN: No rashes. No ecchymoses.  No cyanosis  PSYCHIATRIC: A&O x 3.  Mood & affect appropriate to situation  NEUROLOGICAL: Non-focal, RANDALL x 4 against gravity  VASCULAR: Peripheral pulses palpable Vital Signs Last 24 Hrs  T(C): 36.7 (26 Jan 2023 20:00), Max: 37.2 (26 Jan 2023 14:01)  T(F): 98 (26 Jan 2023 20:00), Max: 99 (26 Jan 2023 14:01)  HR: 79 (26 Jan 2023 20:00) (79 - 103)  BP: 118/82 (26 Jan 2023 20:00) (118/82 - 161/101)  BP(mean): --  RR: 18 (26 Jan 2023 20:00) (18 - 20)  SpO2: 99% (26 Jan 2023 20:00) (94% - 100%)    Parameters below as of 26 Jan 2023 17:42  Patient On (Oxygen Delivery Method): room air    ===========================================================================================    PHYSICAL EXAMINATION:    APPEARANCE: Adequately groomed, obese female, lying propped up in stretcher in NAD  HEENT: Dry oral mucosa.  Pupils reactive to light.  EOMI  LYMPHATIC: No lymphadenopathy appreciated  CARDIOVASCULAR: (+) S1 S2.  No JVD.  No murmurs.  No edema  RESPIRATORY: No wheezing, rhonchi, crackles appreciated  GASTROINTESTINAL: Soft.  Non-tender.  (+) BS  GENITOURINARY: No suprapubic tenderness.  No CVA tenderness B/L  EXTREMITIES: Normal range of motion.  No clubbing, cyanosis or edema  MUSCULOSKELETAL: No atrophy.  No asymmetry.  Good ROM  SKIN: No rashes. No ecchymoses.  No cyanosis  PSYCHIATRIC: A&O x 3.  Mood & affect appropriate to situation  NEUROLOGICAL: Non-focal, RANDALL x 4 against gravity  VASCULAR: Peripheral pulses palpable

## 2023-01-26 NOTE — ED PROVIDER NOTE - ATTENDING CONTRIBUTION TO CARE
Ryland Meeks DO:  patient seen and evaluated with the resident. I was present for key portions of the History & Physical, and I agree with the Impression & Plan. 53 yo f pmh  DM, HLD, HTN asthma, pw .  Reports has been ill for the last 3 days, including malaise, F/C, dry cough, SOB, exertional.  Today was diagnosed with COVID.  Seen in urgent care.  Advised to ED due to tachypnea.  Patient reports history of RAD, states whenever she gets a URI may develop into a pneumonia.  Denies recent travel.  Patient arrives HDS, well-appearing, mild tachypnea.  Do not suspect PE, aortic cancer.  Do not suspect ACS.  Likely COVID and URI.  Given tachypnea and SOB will treat, will require amatory pulse ox and reassessment.

## 2023-01-26 NOTE — H&P ADULT - PROBLEM SELECTOR PROBLEM 3
Uncontrolled type 2 diabetes mellitus with hyperglycemia, with long-term current use of insulin Left-sided chest pain

## 2023-01-26 NOTE — H&P ADULT - NSICDXPASTSURGICALHX_GEN_ALL_CORE_FT
PAST SURGICAL HISTORY:   Section     Cholecystectomy     h/o Removal of  Cyst of Bartholin's gland     Lipoma of back removal 2012

## 2023-01-26 NOTE — H&P ADULT - PROBLEM SELECTOR PLAN 1
- worsening shortness of breath, along with fever, chills, body aches and fatigue, over the past 3 to 4 days, in the context of COVID-19 infection and chronic reactive airway disease - as sequela of being [place]  - has had multiple episodes of pneumonia in the past  - likely complicated by obesity (??DELVIN; not on CPAP at home)  - unlikely of cardiac origin, but needs further eval (hx palpitations, HTN)  - non-responsive to bronchodilator therapies at home  - respiratory acidosis present (pH = 7.31, pCO2 = 60).  Laactate = 1.8  - report of wheezing during exertion, but no wheezing at rest in the ED  - given Solu-medrol 125 mg x one in the ED, as well as duo-neb x one  - continuing w/ Solu-medrol 60 mg IV Q8H x 3 doses (then re-evaluate), and duo-nebs Q6H PRN  - supplemental oxygen PRN  - ensure optimal hydration  - HOB elevation  - no need for Remdesivir presently.  - Pulmonology consult in the AM (please call); - worsening shortness of breath, along with fever, chills, body aches and fatigue, over the past 3 to 4 days, in the context of COVID-19 infection and chronic reactive airway disease - as sequela of being [place]  - has had multiple episodes of pneumonia in the past  - likely complicated by obesity (??DELVIN; not on CPAP at home)  - unlikely of cardiac origin, but needs further eval (hx palpitations, HTN)  - non-responsive to bronchodilator therapies at home  - respiratory acidosis present (pH = 7.31, pCO2 = 60).  Lactate = 1.8  - report of wheezing during exertion, but no wheezing at rest in the ED  - given Solu-medrol 125 mg x one in the ED, as well as duo-neb x one  - continuing w/ Solu-medrol 60 mg IV Q8H x 3 doses (then re-evaluate), and duo-nebs Q6H PRN  - supplemental oxygen PRN  - ensure optimal hydration  - HOB elevation  - no need for Remdesivir presently  - Pulmonology consult in the AM (please call);

## 2023-01-27 DIAGNOSIS — J45.901 UNSPECIFIED ASTHMA WITH (ACUTE) EXACERBATION: ICD-10-CM

## 2023-01-27 DIAGNOSIS — E83.42 HYPOMAGNESEMIA: ICD-10-CM

## 2023-01-27 DIAGNOSIS — Z79.899 OTHER LONG TERM (CURRENT) DRUG THERAPY: ICD-10-CM

## 2023-01-27 DIAGNOSIS — R07.9 CHEST PAIN, UNSPECIFIED: ICD-10-CM

## 2023-01-27 DIAGNOSIS — R06.02 SHORTNESS OF BREATH: ICD-10-CM

## 2023-01-27 DIAGNOSIS — E11.65 TYPE 2 DIABETES MELLITUS WITH HYPERGLYCEMIA: ICD-10-CM

## 2023-01-27 DIAGNOSIS — Z29.9 ENCOUNTER FOR PROPHYLACTIC MEASURES, UNSPECIFIED: ICD-10-CM

## 2023-01-27 DIAGNOSIS — R91.1 SOLITARY PULMONARY NODULE: ICD-10-CM

## 2023-01-27 DIAGNOSIS — G50.0 TRIGEMINAL NEURALGIA: ICD-10-CM

## 2023-01-27 LAB
A1C WITH ESTIMATED AVERAGE GLUCOSE RESULT: 11.4 % — HIGH (ref 4–5.6)
ALBUMIN SERPL ELPH-MCNC: 3.6 G/DL — SIGNIFICANT CHANGE UP (ref 3.3–5)
ALP SERPL-CCNC: 109 U/L — SIGNIFICANT CHANGE UP (ref 40–120)
ALT FLD-CCNC: 17 U/L — SIGNIFICANT CHANGE UP (ref 4–33)
ANION GAP SERPL CALC-SCNC: 10 MMOL/L — SIGNIFICANT CHANGE UP (ref 7–14)
APTT BLD: 28.9 SEC — SIGNIFICANT CHANGE UP (ref 27–36.3)
AST SERPL-CCNC: 16 U/L — SIGNIFICANT CHANGE UP (ref 4–32)
BASE EXCESS BLDV CALC-SCNC: -0.8 MMOL/L — SIGNIFICANT CHANGE UP (ref -2–3)
BILIRUB SERPL-MCNC: <0.2 MG/DL — SIGNIFICANT CHANGE UP (ref 0.2–1.2)
BLOOD GAS VENOUS COMPREHENSIVE RESULT: SIGNIFICANT CHANGE UP
BUN SERPL-MCNC: 18 MG/DL — SIGNIFICANT CHANGE UP (ref 7–23)
CALCIUM SERPL-MCNC: 9.1 MG/DL — SIGNIFICANT CHANGE UP (ref 8.4–10.5)
CHLORIDE BLDV-SCNC: 102 MMOL/L — SIGNIFICANT CHANGE UP (ref 96–108)
CHLORIDE SERPL-SCNC: 101 MMOL/L — SIGNIFICANT CHANGE UP (ref 98–107)
CO2 BLDV-SCNC: 25.6 MMOL/L — SIGNIFICANT CHANGE UP (ref 22–26)
CO2 SERPL-SCNC: 23 MMOL/L — SIGNIFICANT CHANGE UP (ref 22–31)
CREAT SERPL-MCNC: 0.66 MG/DL — SIGNIFICANT CHANGE UP (ref 0.5–1.3)
EGFR: 104 ML/MIN/1.73M2 — SIGNIFICANT CHANGE UP
ESTIMATED AVERAGE GLUCOSE: 280 — SIGNIFICANT CHANGE UP
GAS PNL BLDV: 133 MMOL/L — LOW (ref 136–145)
GLUCOSE BLDC GLUCOMTR-MCNC: 322 MG/DL — HIGH (ref 70–99)
GLUCOSE BLDC GLUCOMTR-MCNC: 333 MG/DL — HIGH (ref 70–99)
GLUCOSE BLDC GLUCOMTR-MCNC: 357 MG/DL — HIGH (ref 70–99)
GLUCOSE BLDC GLUCOMTR-MCNC: 416 MG/DL — HIGH (ref 70–99)
GLUCOSE BLDV-MCNC: 427 MG/DL — HIGH (ref 70–99)
GLUCOSE SERPL-MCNC: 380 MG/DL — HIGH (ref 70–99)
HCO3 BLDV-SCNC: 24 MMOL/L — SIGNIFICANT CHANGE UP (ref 22–29)
HCT VFR BLD CALC: 35.1 % — SIGNIFICANT CHANGE UP (ref 34.5–45)
HCT VFR BLDA CALC: 35 % — SIGNIFICANT CHANGE UP (ref 34.5–46.5)
HGB BLD CALC-MCNC: 11.8 G/DL — SIGNIFICANT CHANGE UP (ref 11.5–15.5)
HGB BLD-MCNC: 11 G/DL — LOW (ref 11.5–15.5)
INR BLD: 1.01 RATIO — SIGNIFICANT CHANGE UP (ref 0.88–1.16)
LACTATE BLDV-MCNC: 2.3 MMOL/L — HIGH (ref 0.5–2)
MAGNESIUM SERPL-MCNC: 1.7 MG/DL — SIGNIFICANT CHANGE UP (ref 1.6–2.6)
MCHC RBC-ENTMCNC: 26.9 PG — LOW (ref 27–34)
MCHC RBC-ENTMCNC: 31.3 GM/DL — LOW (ref 32–36)
MCV RBC AUTO: 85.8 FL — SIGNIFICANT CHANGE UP (ref 80–100)
NRBC # BLD: 0 /100 WBCS — SIGNIFICANT CHANGE UP (ref 0–0)
NRBC # FLD: 0 K/UL — SIGNIFICANT CHANGE UP (ref 0–0)
PCO2 BLDV: 41 MMHG — SIGNIFICANT CHANGE UP (ref 39–42)
PH BLDV: 7.38 — SIGNIFICANT CHANGE UP (ref 7.32–7.43)
PHOSPHATE SERPL-MCNC: 3.4 MG/DL — SIGNIFICANT CHANGE UP (ref 2.5–4.5)
PLATELET # BLD AUTO: 204 K/UL — SIGNIFICANT CHANGE UP (ref 150–400)
PO2 BLDV: 146 MMHG — SIGNIFICANT CHANGE UP
POTASSIUM BLDV-SCNC: 4.4 MMOL/L — SIGNIFICANT CHANGE UP (ref 3.5–5.1)
POTASSIUM SERPL-MCNC: 4.2 MMOL/L — SIGNIFICANT CHANGE UP (ref 3.5–5.3)
POTASSIUM SERPL-SCNC: 4.2 MMOL/L — SIGNIFICANT CHANGE UP (ref 3.5–5.3)
PROCALCITONIN SERPL-MCNC: 0.04 NG/ML — SIGNIFICANT CHANGE UP (ref 0.02–0.1)
PROT SERPL-MCNC: 6.8 G/DL — SIGNIFICANT CHANGE UP (ref 6–8.3)
PROTHROM AB SERPL-ACNC: 11.7 SEC — SIGNIFICANT CHANGE UP (ref 10.5–13.4)
RBC # BLD: 4.09 M/UL — SIGNIFICANT CHANGE UP (ref 3.8–5.2)
RBC # FLD: 13.4 % — SIGNIFICANT CHANGE UP (ref 10.3–14.5)
SAO2 % BLDV: 99.1 % — SIGNIFICANT CHANGE UP
SODIUM SERPL-SCNC: 134 MMOL/L — LOW (ref 135–145)
TROPONIN T, HIGH SENSITIVITY RESULT: <6 NG/L — SIGNIFICANT CHANGE UP
WBC # BLD: 9.4 K/UL — SIGNIFICANT CHANGE UP (ref 3.8–10.5)
WBC # FLD AUTO: 9.4 K/UL — SIGNIFICANT CHANGE UP (ref 3.8–10.5)

## 2023-01-27 PROCEDURE — 99233 SBSQ HOSP IP/OBS HIGH 50: CPT

## 2023-01-27 PROCEDURE — 93306 TTE W/DOPPLER COMPLETE: CPT | Mod: 26

## 2023-01-27 RX ORDER — MAGNESIUM OXIDE 400 MG ORAL TABLET 241.3 MG
400 TABLET ORAL
Refills: 0 | Status: DISCONTINUED | OUTPATIENT
Start: 2023-01-27 | End: 2023-01-27

## 2023-01-27 RX ORDER — REMDESIVIR 5 MG/ML
100 INJECTION INTRAVENOUS EVERY 24 HOURS
Refills: 0 | Status: COMPLETED | OUTPATIENT
Start: 2023-01-28 | End: 2023-01-29

## 2023-01-27 RX ORDER — ENOXAPARIN SODIUM 100 MG/ML
40 INJECTION SUBCUTANEOUS EVERY 12 HOURS
Refills: 0 | Status: DISCONTINUED | OUTPATIENT
Start: 2023-01-27 | End: 2023-01-31

## 2023-01-27 RX ORDER — ATORVASTATIN CALCIUM 80 MG/1
10 TABLET, FILM COATED ORAL AT BEDTIME
Refills: 0 | Status: DISCONTINUED | OUTPATIENT
Start: 2023-01-27 | End: 2023-01-31

## 2023-01-27 RX ORDER — CHOLECALCIFEROL (VITAMIN D3) 125 MCG
2000 CAPSULE ORAL DAILY
Refills: 0 | Status: DISCONTINUED | OUTPATIENT
Start: 2023-01-27 | End: 2023-01-31

## 2023-01-27 RX ORDER — ALBUTEROL 90 UG/1
2 AEROSOL, METERED ORAL EVERY 6 HOURS
Refills: 0 | Status: DISCONTINUED | OUTPATIENT
Start: 2023-01-27 | End: 2023-01-30

## 2023-01-27 RX ORDER — MAGNESIUM OXIDE 400 MG ORAL TABLET 241.3 MG
2 TABLET ORAL
Qty: 0 | Refills: 0 | DISCHARGE

## 2023-01-27 RX ORDER — ZINC SULFATE TAB 220 MG (50 MG ZINC EQUIVALENT) 220 (50 ZN) MG
220 TAB ORAL DAILY
Refills: 0 | Status: DISCONTINUED | OUTPATIENT
Start: 2023-01-27 | End: 2023-01-31

## 2023-01-27 RX ORDER — REMDESIVIR 5 MG/ML
INJECTION INTRAVENOUS
Refills: 0 | Status: COMPLETED | OUTPATIENT
Start: 2023-01-29 | End: 2023-01-29

## 2023-01-27 RX ORDER — INSULIN LISPRO 100/ML
15 VIAL (ML) SUBCUTANEOUS
Refills: 0 | Status: DISCONTINUED | OUTPATIENT
Start: 2023-01-27 | End: 2023-01-31

## 2023-01-27 RX ORDER — BUDESONIDE AND FORMOTEROL FUMARATE DIHYDRATE 160; 4.5 UG/1; UG/1
2 AEROSOL RESPIRATORY (INHALATION)
Refills: 0 | Status: DISCONTINUED | OUTPATIENT
Start: 2023-01-27 | End: 2023-01-31

## 2023-01-27 RX ORDER — IPRATROPIUM/ALBUTEROL SULFATE 18-103MCG
3 AEROSOL WITH ADAPTER (GRAM) INHALATION EVERY 6 HOURS
Refills: 0 | Status: DISCONTINUED | OUTPATIENT
Start: 2023-01-27 | End: 2023-01-27

## 2023-01-27 RX ORDER — LISINOPRIL 2.5 MG/1
20 TABLET ORAL
Refills: 0 | Status: DISCONTINUED | OUTPATIENT
Start: 2023-01-27 | End: 2023-01-31

## 2023-01-27 RX ORDER — ASCORBIC ACID 60 MG
500 TABLET,CHEWABLE ORAL DAILY
Refills: 0 | Status: DISCONTINUED | OUTPATIENT
Start: 2023-01-27 | End: 2023-01-31

## 2023-01-27 RX ORDER — REMDESIVIR 5 MG/ML
100 INJECTION INTRAVENOUS EVERY 24 HOURS
Refills: 0 | Status: DISCONTINUED | OUTPATIENT
Start: 2023-01-27 | End: 2023-01-27

## 2023-01-27 RX ORDER — ESCITALOPRAM OXALATE 10 MG/1
10 TABLET, FILM COATED ORAL DAILY
Refills: 0 | Status: DISCONTINUED | OUTPATIENT
Start: 2023-01-27 | End: 2023-01-31

## 2023-01-27 RX ORDER — REMDESIVIR 5 MG/ML
200 INJECTION INTRAVENOUS EVERY 24 HOURS
Refills: 0 | Status: COMPLETED | OUTPATIENT
Start: 2023-01-27 | End: 2023-01-27

## 2023-01-27 RX ORDER — INSULIN LISPRO 100/ML
15 VIAL (ML) SUBCUTANEOUS ONCE
Refills: 0 | Status: COMPLETED | OUTPATIENT
Start: 2023-01-27 | End: 2023-01-27

## 2023-01-27 RX ORDER — MAGNESIUM SULFATE 500 MG/ML
2 VIAL (ML) INJECTION ONCE
Refills: 0 | Status: COMPLETED | OUTPATIENT
Start: 2023-01-27 | End: 2023-01-27

## 2023-01-27 RX ORDER — INSULIN LISPRO 100/ML
6 VIAL (ML) SUBCUTANEOUS ONCE
Refills: 0 | Status: COMPLETED | OUTPATIENT
Start: 2023-01-27 | End: 2023-01-27

## 2023-01-27 RX ORDER — ESCITALOPRAM OXALATE 10 MG/1
1 TABLET, FILM COATED ORAL
Qty: 0 | Refills: 0 | DISCHARGE

## 2023-01-27 RX ADMIN — ZINC SULFATE TAB 220 MG (50 MG ZINC EQUIVALENT) 220 MILLIGRAM(S): 220 (50 ZN) TAB at 15:24

## 2023-01-27 RX ADMIN — Medication 15 UNIT(S): at 08:20

## 2023-01-27 RX ADMIN — Medication 2000 UNIT(S): at 12:42

## 2023-01-27 RX ADMIN — Medication 60 MILLIGRAM(S): at 08:24

## 2023-01-27 RX ADMIN — Medication 1 TABLET(S): at 12:42

## 2023-01-27 RX ADMIN — ENOXAPARIN SODIUM 40 MILLIGRAM(S): 100 INJECTION SUBCUTANEOUS at 12:57

## 2023-01-27 RX ADMIN — Medication 25 GRAM(S): at 00:59

## 2023-01-27 RX ADMIN — ESCITALOPRAM OXALATE 10 MILLIGRAM(S): 10 TABLET, FILM COATED ORAL at 12:42

## 2023-01-27 RX ADMIN — ALBUTEROL 2 PUFF(S): 90 AEROSOL, METERED ORAL at 12:57

## 2023-01-27 RX ADMIN — Medication 6: at 21:59

## 2023-01-27 RX ADMIN — Medication 8: at 08:18

## 2023-01-27 RX ADMIN — LISINOPRIL 20 MILLIGRAM(S): 2.5 TABLET ORAL at 18:09

## 2023-01-27 RX ADMIN — LISINOPRIL 20 MILLIGRAM(S): 2.5 TABLET ORAL at 12:57

## 2023-01-27 RX ADMIN — Medication 15 UNIT(S): at 12:41

## 2023-01-27 RX ADMIN — MAGNESIUM OXIDE 400 MG ORAL TABLET 400 MILLIGRAM(S): 241.3 TABLET ORAL at 08:25

## 2023-01-27 RX ADMIN — ATORVASTATIN CALCIUM 10 MILLIGRAM(S): 80 TABLET, FILM COATED ORAL at 21:59

## 2023-01-27 RX ADMIN — INSULIN GLARGINE 28 UNIT(S): 100 INJECTION, SOLUTION SUBCUTANEOUS at 21:58

## 2023-01-27 RX ADMIN — Medication 12: at 12:41

## 2023-01-27 RX ADMIN — Medication 6 UNIT(S): at 01:19

## 2023-01-27 RX ADMIN — BUDESONIDE AND FORMOTEROL FUMARATE DIHYDRATE 2 PUFF(S): 160; 4.5 AEROSOL RESPIRATORY (INHALATION) at 13:36

## 2023-01-27 RX ADMIN — Medication 60 MILLIGRAM(S): at 22:12

## 2023-01-27 RX ADMIN — SODIUM CHLORIDE 100 MILLILITER(S): 9 INJECTION INTRAMUSCULAR; INTRAVENOUS; SUBCUTANEOUS at 07:27

## 2023-01-27 RX ADMIN — INSULIN GLARGINE 24 UNIT(S): 100 INJECTION, SOLUTION SUBCUTANEOUS at 00:58

## 2023-01-27 RX ADMIN — Medication 500 MILLIGRAM(S): at 12:57

## 2023-01-27 RX ADMIN — Medication 8: at 18:03

## 2023-01-27 RX ADMIN — Medication 25 GRAM(S): at 13:36

## 2023-01-27 RX ADMIN — Medication 15 UNIT(S): at 18:04

## 2023-01-27 RX ADMIN — REMDESIVIR 200 MILLIGRAM(S): 5 INJECTION INTRAVENOUS at 18:03

## 2023-01-27 RX ADMIN — Medication 60 MILLIGRAM(S): at 15:25

## 2023-01-27 NOTE — PHARMACOTHERAPY INTERVENTION NOTE - COMMENTS
Medication history is complete. Medication list updated in Outpatient Medication Record ([facility]). List provided by patient and verified with outpatient pharmacy.   Of Note:   - Patient reports on insulin (long acting - 28u SQ QHS and short acting - 11u SQ TID), however unable to recall names. Per Tayler, only insulin on file is Levemir & Humalog (last dispensed in Feb/March 2022).   - Patient also notes she was on Gabapentin & Oxcarbazepine, however have not been on them for about 3 months now.

## 2023-01-27 NOTE — PROGRESS NOTE ADULT - PROBLEM SELECTOR PLAN 1
- worsening shortness of breath, along with fever, chills, body aches and fatigue, over the past 3 to 4 days, in the context of COVID-19 infection and chronic reactive airway disease - as sequela of being [place]; wheezing on 1/27  - has had multiple episodes of pneumonia in the past  - likely complicated by obesity (??DELVIN; not on CPAP at home)  - unlikely of cardiac origin, but needs further eval (hx palpitations, HTN)  - non-responsive to bronchodilator therapies at home-- takes symbicort and albuterol   - respiratory acidosis present (pH = 7.31, pCO2 = 60).  Lactate = 1.8  - given Solu-medrol 125 mg x one in the ED, as well as duo-neb x one  - continuing w/ Solu-medrol 60 mg IV Q8H x 3 doses   - supplemental oxygen PRN, incentive una  - ensure optimal hydration  - HOB elevation  - no need for Remdesivir presently  - Pulmonology consult in the AM (please call); - worsening shortness of breath, along with fever, chills, body aches and fatigue, over the past 3 to 4 days, in the context of COVID-19 infection and chronic reactive airway disease - as sequela of being [place]; wheezing on 1/27  - has had multiple episodes of pneumonia in the past  - likely complicated by obesity (??DELVIN; not on CPAP at home)  - unlikely of cardiac origin, but needs further eval (hx palpitations, HTN)  - non-responsive to bronchodilator therapies at home-- takes symbicort and albuterol   - respiratory acidosis present (pH = 7.31, pCO2 = 60).  Lactate = 1.8  - given Solu-medrol 125 mg x one in the ED, as well as duo-neb x one  - continuing w/ Solu-medrol 60 mg IV Q8H x 3 doses   - supplemental oxygen PRN, incentive una  - ensure optimal hydration  - HOB elevation  - Remdesivir 1/27-1/29  - Pulm consult for reactive airway disease and [place] patient

## 2023-01-27 NOTE — PROGRESS NOTE ADULT - SUBJECTIVE AND OBJECTIVE BOX
[facility] Division of Hospital Medicine  Jovana Portillo MD  Pager (M-F, 8A-5P): [phone]  Other Times:  [phone]      SUBJECTIVE / OVERNIGHT EVENTS: Pt w ongoing wheezing this am, still feels SOB. Not requiring oxygen at this time.     MEDICATIONS  (STANDING):  ascorbic acid 500 milliGRAM(s) Oral daily  atorvastatin 10 milliGRAM(s) Oral at bedtime  budesonide 160 MICROgram(s)/formoterol 4.5 MICROgram(s) Inhaler 2 Puff(s) Inhalation two times a day  cholecalciferol 2000 Unit(s) Oral daily  dextrose 5%. 1000 milliLiter(s) (100 mL/Hr) IV Continuous <Continuous>  dextrose 5%. 1000 milliLiter(s) (50 mL/Hr) IV Continuous <Continuous>  dextrose 50% Injectable 25 Gram(s) IV Push once  dextrose 50% Injectable 12.5 Gram(s) IV Push once  dextrose 50% Injectable 25 Gram(s) IV Push once  enoxaparin Injectable 40 milliGRAM(s) SubCutaneous every 12 hours  escitalopram 10 milliGRAM(s) Oral daily  glucagon  Injectable 1 milliGRAM(s) IntraMuscular once  insulin glargine Injectable (LANTUS) 28 Unit(s) SubCutaneous at bedtime  insulin lispro (ADMELOG) corrective regimen sliding scale   SubCutaneous three times a day before meals  insulin lispro (ADMELOG) corrective regimen sliding scale   SubCutaneous at bedtime  insulin lispro Injectable (ADMELOG) 15 Unit(s) SubCutaneous three times a day before meals  lisinopril 20 milliGRAM(s) Oral two times a day  methylPREDNISolone sodium succinate Injectable 60 milliGRAM(s) IV Push every 8 hours  multivitamin 1 Tablet(s) Oral daily  remdesivir  IVPB   IV Intermittent   remdesivir  IVPB 200 milliGRAM(s) IV Intermittent every 24 hours  sodium chloride 0.9%. 1000 milliLiter(s) (100 mL/Hr) IV Continuous <Continuous>  zinc sulfate 220 milliGRAM(s) Oral daily    MEDICATIONS  (PRN):  acetaminophen     Tablet .. 650 milliGRAM(s) Oral every 6 hours PRN Temp greater or equal to 38C (100.4F), Mild Pain (1 - 3)  albuterol    90 MICROgram(s) HFA Inhaler 2 Puff(s) Inhalation every 6 hours PRN Shortness of Breath and/or Wheezing  dextrose Oral Gel 15 Gram(s) Oral once PRN Blood Glucose LESS THAN 70 milliGRAM(s)/deciliter      I&O's Summary      PHYSICAL EXAM:  Vital Signs Last 24 Hrs  T(C): 36.8 (2023 12:40), Max: 37.2 (2023 15:26)  T(F): 98.2 (2023 12:40), Max: 99 (2023 22:00)  HR: 82 (2023 12:40) (79 - 88)  BP: 150/74 (2023 12:40) (118/82 - 161/101)  BP(mean): --  RR: 16 (2023 12:40) (16 - 20)  SpO2: 98% (2023 12:40) (94% - 100%)    Parameters below as of 2023 12:40  Patient On (Oxygen Delivery Method): room air      CONSTITUTIONAL: NAD, well-developed, well-groomed  EYES: PERRLA; conjunctiva and sclera clear  ENMT: Moist oral mucosa, no pharyngeal injection or exudates; normal dentition  RESPIRATORY: Normal respiratory effort; faint end expiratory wheezing at the L posterior lower lung field   CARDIOVASCULAR: Regular rate and rhythm, normal S1 and S2, no murmur/rub/gallop; No lower extremity edema; Peripheral pulses are 2+ bilaterally  ABDOMEN: Nontender to palpation, normoactive bowel sounds, no rebound/guarding; No hepatosplenomegaly  PSYCH: A+O to person, place, and time; affect appropriate  SKIN: No rashes; no palpable lesions, acanthosis nigricans    LABS:                        11.0   9.40  )-----------( 204      ( 2023 07:02 )             35.1         134<L>  |  101  |  18  ----------------------------<  380<H>  4.2   |  23  |  0.66    Ca    9.1      2023 07:02  Phos  3.4       Mg     1.70         TPro  6.8  /  Alb  3.6  /  TBili  <0.2  /  DBili  x   /  AST  16  /  ALT  17  /  AlkPhos  109      PT/INR - ( 2023 07:02 )   PT: 11.7 sec;   INR: 1.01 ratio         PTT - ( 2023 07:02 )  PTT:28.9 sec      Urinalysis Basic - ( 2023 19:34 )    Color: Yellow / Appearance: Clear / S.019 / pH: x  Gluc: x / Ketone: Negative  / Bili: Negative / Urobili: <2 mg/dL   Blood: x / Protein: 30 mg/dL / Nitrite: Negative   Leuk Esterase: Negative / RBC: 2 /HPF / WBC 1 /HPF   Sq Epi: x / Non Sq Epi: 1 /HPF / Bacteria: Negative          RADIOLOGY & ADDITIONAL TESTS:  Results Reviewed:   Imaging Personally Reviewed:  Electrocardiogram Personally Reviewed:    COORDINATION OF CARE:  Care Discussed with Consultants/Other Providers [Y/N]:  Prior or Outpatient Records Reviewed [Y/N]:

## 2023-01-27 NOTE — PATIENT PROFILE ADULT - FALL HARM RISK - HARM RISK INTERVENTIONS

## 2023-01-28 LAB
ALBUMIN SERPL ELPH-MCNC: 3.6 G/DL — SIGNIFICANT CHANGE UP (ref 3.3–5)
ALP SERPL-CCNC: 111 U/L — SIGNIFICANT CHANGE UP (ref 40–120)
ALT FLD-CCNC: 14 U/L — SIGNIFICANT CHANGE UP (ref 4–33)
ANION GAP SERPL CALC-SCNC: 12 MMOL/L — SIGNIFICANT CHANGE UP (ref 7–14)
APTT BLD: 29 SEC — SIGNIFICANT CHANGE UP (ref 27–36.3)
AST SERPL-CCNC: 17 U/L — SIGNIFICANT CHANGE UP (ref 4–32)
BILIRUB SERPL-MCNC: <0.2 MG/DL — SIGNIFICANT CHANGE UP (ref 0.2–1.2)
BUN SERPL-MCNC: 28 MG/DL — HIGH (ref 7–23)
CALCIUM SERPL-MCNC: 9.3 MG/DL — SIGNIFICANT CHANGE UP (ref 8.4–10.5)
CHLORIDE SERPL-SCNC: 99 MMOL/L — SIGNIFICANT CHANGE UP (ref 98–107)
CO2 SERPL-SCNC: 22 MMOL/L — SIGNIFICANT CHANGE UP (ref 22–31)
CREAT SERPL-MCNC: 0.67 MG/DL — SIGNIFICANT CHANGE UP (ref 0.5–1.3)
CULTURE RESULTS: SIGNIFICANT CHANGE UP
EGFR: 104 ML/MIN/1.73M2 — SIGNIFICANT CHANGE UP
GLUCOSE BLDC GLUCOMTR-MCNC: 259 MG/DL — HIGH (ref 70–99)
GLUCOSE BLDC GLUCOMTR-MCNC: 310 MG/DL — HIGH (ref 70–99)
GLUCOSE BLDC GLUCOMTR-MCNC: 328 MG/DL — HIGH (ref 70–99)
GLUCOSE BLDC GLUCOMTR-MCNC: 366 MG/DL — HIGH (ref 70–99)
GLUCOSE SERPL-MCNC: 423 MG/DL — HIGH (ref 70–99)
HCT VFR BLD CALC: 38.2 % — SIGNIFICANT CHANGE UP (ref 34.5–45)
HGB BLD-MCNC: 11.8 G/DL — SIGNIFICANT CHANGE UP (ref 11.5–15.5)
INR BLD: 0.93 RATIO — SIGNIFICANT CHANGE UP (ref 0.88–1.16)
MAGNESIUM SERPL-MCNC: 2 MG/DL — SIGNIFICANT CHANGE UP (ref 1.6–2.6)
MCHC RBC-ENTMCNC: 26.4 PG — LOW (ref 27–34)
MCHC RBC-ENTMCNC: 30.9 GM/DL — LOW (ref 32–36)
MCV RBC AUTO: 85.5 FL — SIGNIFICANT CHANGE UP (ref 80–100)
MELD SCORE WITH DIALYSIS: 23 POINTS — SIGNIFICANT CHANGE UP
MELD SCORE WITHOUT DIALYSIS: 6 POINTS — SIGNIFICANT CHANGE UP
NRBC # BLD: 0 /100 WBCS — SIGNIFICANT CHANGE UP (ref 0–0)
NRBC # FLD: 0 K/UL — SIGNIFICANT CHANGE UP (ref 0–0)
PHOSPHATE SERPL-MCNC: 3.6 MG/DL — SIGNIFICANT CHANGE UP (ref 2.5–4.5)
PLATELET # BLD AUTO: 241 K/UL — SIGNIFICANT CHANGE UP (ref 150–400)
POTASSIUM SERPL-MCNC: 4.5 MMOL/L — SIGNIFICANT CHANGE UP (ref 3.5–5.3)
POTASSIUM SERPL-SCNC: 4.5 MMOL/L — SIGNIFICANT CHANGE UP (ref 3.5–5.3)
PROT SERPL-MCNC: 7.5 G/DL — SIGNIFICANT CHANGE UP (ref 6–8.3)
PROTHROM AB SERPL-ACNC: 10.8 SEC — SIGNIFICANT CHANGE UP (ref 10.5–13.4)
RBC # BLD: 4.47 M/UL — SIGNIFICANT CHANGE UP (ref 3.8–5.2)
RBC # FLD: 13.3 % — SIGNIFICANT CHANGE UP (ref 10.3–14.5)
SODIUM SERPL-SCNC: 133 MMOL/L — LOW (ref 135–145)
SPECIMEN SOURCE: SIGNIFICANT CHANGE UP
WBC # BLD: 12.18 K/UL — HIGH (ref 3.8–10.5)
WBC # FLD AUTO: 12.18 K/UL — HIGH (ref 3.8–10.5)

## 2023-01-28 PROCEDURE — 99233 SBSQ HOSP IP/OBS HIGH 50: CPT

## 2023-01-28 RX ORDER — FLUTICASONE PROPIONATE 50 MCG
1 SPRAY, SUSPENSION NASAL ONCE
Refills: 0 | Status: COMPLETED | OUTPATIENT
Start: 2023-01-28 | End: 2023-01-28

## 2023-01-28 RX ORDER — FLUTICASONE PROPIONATE 50 MCG
SPRAY, SUSPENSION NASAL
Refills: 0 | Status: DISCONTINUED | OUTPATIENT
Start: 2023-01-28 | End: 2023-01-31

## 2023-01-28 RX ORDER — INSULIN GLARGINE 100 [IU]/ML
7 INJECTION, SOLUTION SUBCUTANEOUS ONCE
Refills: 0 | Status: COMPLETED | OUTPATIENT
Start: 2023-01-28 | End: 2023-01-28

## 2023-01-28 RX ORDER — FLUTICASONE PROPIONATE 50 MCG
1 SPRAY, SUSPENSION NASAL
Refills: 0 | Status: DISCONTINUED | OUTPATIENT
Start: 2023-01-28 | End: 2023-01-31

## 2023-01-28 RX ADMIN — LISINOPRIL 20 MILLIGRAM(S): 2.5 TABLET ORAL at 17:52

## 2023-01-28 RX ADMIN — ENOXAPARIN SODIUM 40 MILLIGRAM(S): 100 INJECTION SUBCUTANEOUS at 00:39

## 2023-01-28 RX ADMIN — Medication 8: at 13:08

## 2023-01-28 RX ADMIN — Medication 10: at 08:55

## 2023-01-28 RX ADMIN — LISINOPRIL 20 MILLIGRAM(S): 2.5 TABLET ORAL at 06:09

## 2023-01-28 RX ADMIN — INSULIN GLARGINE 28 UNIT(S): 100 INJECTION, SOLUTION SUBCUTANEOUS at 22:37

## 2023-01-28 RX ADMIN — BUDESONIDE AND FORMOTEROL FUMARATE DIHYDRATE 2 PUFF(S): 160; 4.5 AEROSOL RESPIRATORY (INHALATION) at 22:36

## 2023-01-28 RX ADMIN — Medication 1 SPRAY(S): at 13:05

## 2023-01-28 RX ADMIN — ENOXAPARIN SODIUM 40 MILLIGRAM(S): 100 INJECTION SUBCUTANEOUS at 13:06

## 2023-01-28 RX ADMIN — Medication 8: at 17:52

## 2023-01-28 RX ADMIN — Medication 2: at 22:36

## 2023-01-28 RX ADMIN — ESCITALOPRAM OXALATE 10 MILLIGRAM(S): 10 TABLET, FILM COATED ORAL at 13:06

## 2023-01-28 RX ADMIN — Medication 15 UNIT(S): at 08:56

## 2023-01-28 RX ADMIN — ENOXAPARIN SODIUM 40 MILLIGRAM(S): 100 INJECTION SUBCUTANEOUS at 23:31

## 2023-01-28 RX ADMIN — Medication 1 TABLET(S): at 13:05

## 2023-01-28 RX ADMIN — BUDESONIDE AND FORMOTEROL FUMARATE DIHYDRATE 2 PUFF(S): 160; 4.5 AEROSOL RESPIRATORY (INHALATION) at 08:57

## 2023-01-28 RX ADMIN — REMDESIVIR 200 MILLIGRAM(S): 5 INJECTION INTRAVENOUS at 17:51

## 2023-01-28 RX ADMIN — Medication 1 SPRAY(S): at 17:53

## 2023-01-28 RX ADMIN — Medication 15 UNIT(S): at 13:07

## 2023-01-28 RX ADMIN — ATORVASTATIN CALCIUM 10 MILLIGRAM(S): 80 TABLET, FILM COATED ORAL at 22:37

## 2023-01-28 RX ADMIN — Medication 100 MILLIGRAM(S): at 17:54

## 2023-01-28 RX ADMIN — Medication 2000 UNIT(S): at 13:05

## 2023-01-28 RX ADMIN — ZINC SULFATE TAB 220 MG (50 MG ZINC EQUIVALENT) 220 MILLIGRAM(S): 220 (50 ZN) TAB at 13:06

## 2023-01-28 RX ADMIN — Medication 100 MILLIGRAM(S): at 22:39

## 2023-01-28 RX ADMIN — INSULIN GLARGINE 7 UNIT(S): 100 INJECTION, SOLUTION SUBCUTANEOUS at 13:05

## 2023-01-28 RX ADMIN — Medication 500 MILLIGRAM(S): at 13:06

## 2023-01-28 RX ADMIN — Medication 15 UNIT(S): at 17:53

## 2023-01-28 RX ADMIN — Medication 100 MILLIGRAM(S): at 13:05

## 2023-01-28 NOTE — PROGRESS NOTE ADULT - SUBJECTIVE AND OBJECTIVE BOX
[facility] Division of Hospital Medicine  Jovana Portillo MD  Pager (M-F, 8A-5P): [phone]  Other Times:  [phone]      SUBJECTIVE / OVERNIGHT EVENTS: NAEON, continues to have a cough. Wheezing improved. No o2 requirement, afebrile. Having some nasal congestion. Hyperglycemia an ongoing issue.    MEDICATIONS  (STANDING):  ascorbic acid 500 milliGRAM(s) Oral daily  atorvastatin 10 milliGRAM(s) Oral at bedtime  benzonatate 100 milliGRAM(s) Oral three times a day  budesonide 160 MICROgram(s)/formoterol 4.5 MICROgram(s) Inhaler 2 Puff(s) Inhalation two times a day  cholecalciferol 2000 Unit(s) Oral daily  dextrose 5%. 1000 milliLiter(s) (100 mL/Hr) IV Continuous <Continuous>  dextrose 5%. 1000 milliLiter(s) (50 mL/Hr) IV Continuous <Continuous>  dextrose 50% Injectable 25 Gram(s) IV Push once  dextrose 50% Injectable 12.5 Gram(s) IV Push once  dextrose 50% Injectable 25 Gram(s) IV Push once  enoxaparin Injectable 40 milliGRAM(s) SubCutaneous every 12 hours  escitalopram 10 milliGRAM(s) Oral daily  fluticasone propionate 50 MICROgram(s)/spray Nasal Spray      fluticasone propionate 50 MICROgram(s)/spray Nasal Spray 1 Spray(s) Both Nostrils two times a day  glucagon  Injectable 1 milliGRAM(s) IntraMuscular once  insulin glargine Injectable (LANTUS) 28 Unit(s) SubCutaneous at bedtime  insulin lispro (ADMELOG) corrective regimen sliding scale   SubCutaneous three times a day before meals  insulin lispro (ADMELOG) corrective regimen sliding scale   SubCutaneous at bedtime  insulin lispro Injectable (ADMELOG) 15 Unit(s) SubCutaneous three times a day before meals  lisinopril 20 milliGRAM(s) Oral two times a day  multivitamin 1 Tablet(s) Oral daily  remdesivir  IVPB   IV Intermittent   remdesivir  IVPB 100 milliGRAM(s) IV Intermittent every 24 hours  sodium chloride 0.9%. 1000 milliLiter(s) (100 mL/Hr) IV Continuous <Continuous>  zinc sulfate 220 milliGRAM(s) Oral daily    MEDICATIONS  (PRN):  acetaminophen     Tablet .. 650 milliGRAM(s) Oral every 6 hours PRN Temp greater or equal to 38C (100.4F), Mild Pain (1 - 3)  albuterol    90 MICROgram(s) HFA Inhaler 2 Puff(s) Inhalation every 6 hours PRN Shortness of Breath and/or Wheezing  dextrose Oral Gel 15 Gram(s) Oral once PRN Blood Glucose LESS THAN 70 milliGRAM(s)/deciliter  guaiFENesin Oral Liquid (Sugar-Free) 100 milliGRAM(s) Oral every 6 hours PRN Cough      I&O's Summary      PHYSICAL EXAM:  Vital Signs Last 24 Hrs  T(C): 36.8 (2023 13:34), Max: 37.1 (2023 18:00)  T(F): 98.2 (2023 13:34), Max: 98.7 (2023 18:00)  HR: 89 (2023 13:34) (70 - 89)  BP: 154/88 (2023 13:34) (150/75 - 154/88)  BP(mean): --  RR: 17 (2023 13:34) (16 - 18)  SpO2: 99% (2023 13:34) (98% - 99%)    Parameters below as of 2023 13:34  Patient On (Oxygen Delivery Method): room air    CONSTITUTIONAL: NAD, well-developed, well-groomed  EYES: PERRLA; conjunctiva and sclera clear  ENMT: Moist oral mucosa, no pharyngeal injection or exudates; normal dentition  RESPIRATORY: Normal respiratory effort; faint end expiratory wheezing at the L posterior lower lung field is now resolved  CARDIOVASCULAR: Regular rate and rhythm, normal S1 and S2, no murmur/rub/gallop; No lower extremity edema; Peripheral pulses are 2+ bilaterally  ABDOMEN: Nontender to palpation, normoactive bowel sounds, no rebound/guarding; No hepatosplenomegaly  PSYCH: A+O to person, place, and time; affect appropriate  SKIN: No rashes; no palpable lesions, acanthosis nigricans    LABS:                        11.8   12.18 )-----------( 241      ( 2023 06:30 )             38.2         133<L>  |  99  |  28<H>  ----------------------------<  423<H>  4.5   |  22  |  0.67    Ca    9.3      2023 06:30  Phos  3.6       Mg     2.00         TPro  7.5  /  Alb  3.6  /  TBili  <0.2  /  DBili  <0.2  /  AST  17  /  ALT  14  /  AlkPhos  111      PT/INR - ( 2023 06:30 )   PT: 10.8 sec;   INR: 0.93 ratio         PTT - ( 2023 06:30 )  PTT:29.0 sec      Urinalysis Basic - ( 2023 19:34 )    Color: Yellow / Appearance: Clear / S.019 / pH: x  Gluc: x / Ketone: Negative  / Bili: Negative / Urobili: <2 mg/dL   Blood: x / Protein: 30 mg/dL / Nitrite: Negative   Leuk Esterase: Negative / RBC: 2 /HPF / WBC 1 /HPF   Sq Epi: x / Non Sq Epi: 1 /HPF / Bacteria: Negative        Culture - Urine (collected 2023 19:16)  Source: Clean Catch Clean Catch (Midstream)  Final Report (2023 07:37):    <10,000 CFU/mL Normal Urogenital Vanessa        RADIOLOGY & ADDITIONAL TESTS:  Results Reviewed:   Imaging Personally Reviewed:  Electrocardiogram Personally Reviewed:    COORDINATION OF CARE:  Care Discussed with Consultants/Other Providers [Y/N]: Endo  Prior or Outpatient Records Reviewed [Y/N]:

## 2023-01-28 NOTE — PROGRESS NOTE ADULT - PROBLEM SELECTOR PLAN 1
- worsening shortness of breath, along with fever, chills, body aches and fatigue, over the past 3 to 4 days, in the context of COVID-19 infection and chronic reactive airway disease - as sequela of being 9-11/[employer]; wheezing on 1/27  - has had multiple episodes of pneumonia in the past  - likely complicated by obesity (??DELVIN; not on CPAP at home)  - unlikely of cardiac origin, but needs further eval (hx palpitations, HTN)  - non-responsive to bronchodilator therapies at home-- takes symbicort and albuterol   - respiratory acidosis present (pH = 7.31, pCO2 = 60).  Lactate = 1.8  - given Solu-medrol 125 mg x one in the ED, as well as duo-neb x one  - continuing w/ Solu-medrol 60 mg IV Q8H x 3 doses   - supplemental oxygen PRN, incentive una  - HOB elevation, incentive una  - Remdesivir 1/27-1/29  - Pulm consult for reactive airway disease and ground zero patient-- mariella, rec's cont above therapies.

## 2023-01-29 ENCOUNTER — TRANSCRIPTION ENCOUNTER (OUTPATIENT)
Age: 53
End: 2023-01-29

## 2023-01-29 LAB
ALBUMIN SERPL ELPH-MCNC: 3.2 G/DL — LOW (ref 3.3–5)
ALP SERPL-CCNC: 92 U/L — SIGNIFICANT CHANGE UP (ref 40–120)
ALT FLD-CCNC: 17 U/L — SIGNIFICANT CHANGE UP (ref 4–33)
ANION GAP SERPL CALC-SCNC: 9 MMOL/L — SIGNIFICANT CHANGE UP (ref 7–14)
AST SERPL-CCNC: 14 U/L — SIGNIFICANT CHANGE UP (ref 4–32)
BILIRUB SERPL-MCNC: <0.2 MG/DL — SIGNIFICANT CHANGE UP (ref 0.2–1.2)
BUN SERPL-MCNC: 33 MG/DL — HIGH (ref 7–23)
CALCIUM SERPL-MCNC: 9.2 MG/DL — SIGNIFICANT CHANGE UP (ref 8.4–10.5)
CHLORIDE SERPL-SCNC: 99 MMOL/L — SIGNIFICANT CHANGE UP (ref 98–107)
CO2 SERPL-SCNC: 26 MMOL/L — SIGNIFICANT CHANGE UP (ref 22–31)
CREAT SERPL-MCNC: 0.73 MG/DL — SIGNIFICANT CHANGE UP (ref 0.5–1.3)
EGFR: 98 ML/MIN/1.73M2 — SIGNIFICANT CHANGE UP
GLUCOSE BLDC GLUCOMTR-MCNC: 149 MG/DL — HIGH (ref 70–99)
GLUCOSE BLDC GLUCOMTR-MCNC: 166 MG/DL — HIGH (ref 70–99)
GLUCOSE BLDC GLUCOMTR-MCNC: 189 MG/DL — HIGH (ref 70–99)
GLUCOSE BLDC GLUCOMTR-MCNC: 237 MG/DL — HIGH (ref 70–99)
GLUCOSE SERPL-MCNC: 301 MG/DL — HIGH (ref 70–99)
HCT VFR BLD CALC: 35.8 % — SIGNIFICANT CHANGE UP (ref 34.5–45)
HGB BLD-MCNC: 10.9 G/DL — LOW (ref 11.5–15.5)
MAGNESIUM SERPL-MCNC: 1.7 MG/DL — SIGNIFICANT CHANGE UP (ref 1.6–2.6)
MCHC RBC-ENTMCNC: 26.7 PG — LOW (ref 27–34)
MCHC RBC-ENTMCNC: 30.4 GM/DL — LOW (ref 32–36)
MCV RBC AUTO: 87.7 FL — SIGNIFICANT CHANGE UP (ref 80–100)
NRBC # BLD: 0 /100 WBCS — SIGNIFICANT CHANGE UP (ref 0–0)
NRBC # FLD: 0 K/UL — SIGNIFICANT CHANGE UP (ref 0–0)
PHOSPHATE SERPL-MCNC: 3.5 MG/DL — SIGNIFICANT CHANGE UP (ref 2.5–4.5)
PLATELET # BLD AUTO: 206 K/UL — SIGNIFICANT CHANGE UP (ref 150–400)
POTASSIUM SERPL-MCNC: 4.1 MMOL/L — SIGNIFICANT CHANGE UP (ref 3.5–5.3)
POTASSIUM SERPL-SCNC: 4.1 MMOL/L — SIGNIFICANT CHANGE UP (ref 3.5–5.3)
PROT SERPL-MCNC: 6.4 G/DL — SIGNIFICANT CHANGE UP (ref 6–8.3)
RBC # BLD: 4.08 M/UL — SIGNIFICANT CHANGE UP (ref 3.8–5.2)
RBC # FLD: 13.4 % — SIGNIFICANT CHANGE UP (ref 10.3–14.5)
SODIUM SERPL-SCNC: 134 MMOL/L — LOW (ref 135–145)
WBC # BLD: 11.44 K/UL — HIGH (ref 3.8–10.5)
WBC # FLD AUTO: 11.44 K/UL — HIGH (ref 3.8–10.5)

## 2023-01-29 PROCEDURE — 99233 SBSQ HOSP IP/OBS HIGH 50: CPT

## 2023-01-29 RX ORDER — INSULIN DETEMIR 100/ML (3)
28 INSULIN PEN (ML) SUBCUTANEOUS
Qty: 0 | Refills: 0 | DISCHARGE

## 2023-01-29 RX ORDER — INSULIN GLARGINE 100 [IU]/ML
7 INJECTION, SOLUTION SUBCUTANEOUS ONCE
Refills: 0 | Status: COMPLETED | OUTPATIENT
Start: 2023-01-29 | End: 2023-01-29

## 2023-01-29 RX ORDER — INSULIN LISPRO 100/ML
14 VIAL (ML) SUBCUTANEOUS
Qty: 1 | Refills: 0
Start: 2023-01-29

## 2023-01-29 RX ORDER — CHOLECALCIFEROL (VITAMIN D3) 125 MCG
2000 CAPSULE ORAL
Qty: 0 | Refills: 0 | DISCHARGE
Start: 2023-01-29

## 2023-01-29 RX ORDER — ASCORBIC ACID 60 MG
1 TABLET,CHEWABLE ORAL
Qty: 0 | Refills: 0 | DISCHARGE
Start: 2023-01-29

## 2023-01-29 RX ORDER — INSULIN LISPRO 100/ML
11 VIAL (ML) SUBCUTANEOUS
Qty: 0 | Refills: 0 | DISCHARGE

## 2023-01-29 RX ORDER — INSULIN GLARGINE 100 [IU]/ML
30 INJECTION, SOLUTION SUBCUTANEOUS AT BEDTIME
Refills: 0 | Status: DISCONTINUED | OUTPATIENT
Start: 2023-01-29 | End: 2023-01-31

## 2023-01-29 RX ORDER — ZINC SULFATE TAB 220 MG (50 MG ZINC EQUIVALENT) 220 (50 ZN) MG
1 TAB ORAL
Qty: 0 | Refills: 0 | DISCHARGE
Start: 2023-01-29

## 2023-01-29 RX ORDER — FLUTICASONE PROPIONATE 50 MCG
1 SPRAY, SUSPENSION NASAL
Qty: 0 | Refills: 0 | DISCHARGE
Start: 2023-01-29

## 2023-01-29 RX ORDER — INSULIN DETEMIR 100/ML (3)
30 INSULIN PEN (ML) SUBCUTANEOUS
Qty: 5 | Refills: 0
Start: 2023-01-29 | End: 2023-02-27

## 2023-01-29 RX ORDER — MAGNESIUM SULFATE 500 MG/ML
2 VIAL (ML) INJECTION ONCE
Refills: 0 | Status: COMPLETED | OUTPATIENT
Start: 2023-01-29 | End: 2023-01-29

## 2023-01-29 RX ADMIN — LISINOPRIL 20 MILLIGRAM(S): 2.5 TABLET ORAL at 06:11

## 2023-01-29 RX ADMIN — Medication 15 UNIT(S): at 18:25

## 2023-01-29 RX ADMIN — Medication 100 MILLIGRAM(S): at 06:13

## 2023-01-29 RX ADMIN — Medication 1 SPRAY(S): at 18:38

## 2023-01-29 RX ADMIN — Medication 100 MILLIGRAM(S): at 06:18

## 2023-01-29 RX ADMIN — Medication 15 UNIT(S): at 12:43

## 2023-01-29 RX ADMIN — Medication 4: at 09:48

## 2023-01-29 RX ADMIN — Medication 15 UNIT(S): at 09:49

## 2023-01-29 RX ADMIN — ZINC SULFATE TAB 220 MG (50 MG ZINC EQUIVALENT) 220 MILLIGRAM(S): 220 (50 ZN) TAB at 12:45

## 2023-01-29 RX ADMIN — Medication 100 MILLIGRAM(S): at 21:17

## 2023-01-29 RX ADMIN — ATORVASTATIN CALCIUM 10 MILLIGRAM(S): 80 TABLET, FILM COATED ORAL at 21:13

## 2023-01-29 RX ADMIN — Medication 2: at 12:43

## 2023-01-29 RX ADMIN — REMDESIVIR 200 MILLIGRAM(S): 5 INJECTION INTRAVENOUS at 18:42

## 2023-01-29 RX ADMIN — INSULIN GLARGINE 7 UNIT(S): 100 INJECTION, SOLUTION SUBCUTANEOUS at 09:48

## 2023-01-29 RX ADMIN — Medication 25 GRAM(S): at 09:53

## 2023-01-29 RX ADMIN — Medication 500 MILLIGRAM(S): at 12:45

## 2023-01-29 RX ADMIN — Medication 1 TABLET(S): at 12:44

## 2023-01-29 RX ADMIN — Medication 2000 UNIT(S): at 12:45

## 2023-01-29 RX ADMIN — LISINOPRIL 20 MILLIGRAM(S): 2.5 TABLET ORAL at 18:37

## 2023-01-29 RX ADMIN — Medication 1 SPRAY(S): at 06:12

## 2023-01-29 RX ADMIN — Medication 2: at 18:23

## 2023-01-29 RX ADMIN — ENOXAPARIN SODIUM 40 MILLIGRAM(S): 100 INJECTION SUBCUTANEOUS at 12:44

## 2023-01-29 RX ADMIN — ESCITALOPRAM OXALATE 10 MILLIGRAM(S): 10 TABLET, FILM COATED ORAL at 12:45

## 2023-01-29 RX ADMIN — Medication 100 MILLIGRAM(S): at 12:46

## 2023-01-29 RX ADMIN — BUDESONIDE AND FORMOTEROL FUMARATE DIHYDRATE 2 PUFF(S): 160; 4.5 AEROSOL RESPIRATORY (INHALATION) at 21:13

## 2023-01-29 RX ADMIN — BUDESONIDE AND FORMOTEROL FUMARATE DIHYDRATE 2 PUFF(S): 160; 4.5 AEROSOL RESPIRATORY (INHALATION) at 09:49

## 2023-01-29 RX ADMIN — INSULIN GLARGINE 30 UNIT(S): 100 INJECTION, SOLUTION SUBCUTANEOUS at 21:53

## 2023-01-29 RX ADMIN — ENOXAPARIN SODIUM 40 MILLIGRAM(S): 100 INJECTION SUBCUTANEOUS at 23:24

## 2023-01-29 NOTE — PROGRESS NOTE ADULT - PROBLEM SELECTOR PLAN 1
- worsening shortness of breath, along with fever, chills, body aches and fatigue, over the past 3 to 4 days, in the context of COVID-19 infection and chronic reactive airway disease - as sequela of being 9-11/[employer]; wheezing on 1/27  - has had multiple episodes of pneumonia in the past  - unlikely of cardiac origin, but needs further eval (hx palpitations, HTN)- follows with OP Cardiologist EP, diagnosed w MVP, holter monitor was nml per pt  - non-responsive to bronchodilator therapies at home-- takes symbicort and albuterol   - respiratory acidosis present (pH = 7.31, pCO2 = 60).  Lactate = 1.8  - given Solu-medrol 125 mg x one in the ED, as well as duo-neb x one  - s/p Solu-medrol 60 mg IV Q8H x 3 doses   - supplemental oxygen PRN, incentive una  - HOB elevation, incentive una  - Remdesivir 1/27-1/29  - Pulm consult for reactive airway disease and ground zero patient-- curbside, rec's cont above therapies.  - Pt requesting levaquin (has taken it in the past for bronchitis)-- holding off on antibiotics for now as symptoms more likely explained by COVID PNA than a post viral bacterial infection. F/u sputum cultures [ ].

## 2023-01-29 NOTE — DISCHARGE NOTE PROVIDER - NSDCMRMEDTOKEN_GEN_ALL_CORE_FT
atorvastatin 10 mg oral tablet: 1 tab(s) orally once a day (Last dispensed in Nov/2022 x 1 month).   budesonide-formoterol 160 mcg-4.5 mcg/inh inhalation aerosol: 2 puff(s) inhaled 2 times a day  escitalopram 10 mg oral tablet: 1 tab(s) orally once a day  HumaLOG 100 units/mL injectable solution: 11 unit(s) injectable 3 times a day   (Last dispensed Feb/2022)  Levemir 100 units/mL subcutaneous solution: 28 unit(s) subcutaneous once a day (at bedtime)  (Last dispensed March/2022).   lisinopril 20 mg oral tablet: 1 tab(s) orally 2 times a day (Last dispensed 1/12/23 x 90 days).   magnesium oxide 250 mg oral tablet: Per Patient: 2 tab(s) orally 2 times a day (1000mg daily)  metFORMIN 500 mg oral tablet: 1 tab(s) orally 2 times a day   (Last dispensed Nov/2022 x 90 days).   Ventolin HFA 90 mcg/inh inhalation aerosol: 2 puff(s) inhaled 4 times a day, As Needed   ascorbic acid 500 mg oral tablet: 1 tab(s) orally once a day  atorvastatin 10 mg oral tablet: 1 tab(s) orally once a day (Last dispensed in Nov/2022 x 1 month).   budesonide-formoterol 160 mcg-4.5 mcg/inh inhalation aerosol: 2 puff(s) inhaled 2 times a day  cholecalciferol oral tablet: 2000 unit(s) orally once a day  escitalopram 10 mg oral tablet: 1 tab(s) orally once a day  fluticasone 50 mcg/inh nasal spray: 1 spray(s) nasal once a day for 5 days  HumaLOG 100 units/mL injectable solution: 14 unit(s) injectable 3 times a day before meals   Levemir FlexPen 100 units/mL subcutaneous solution: 30 unit(s) subcutaneous once a day (at bedtime)   lisinopril 20 mg oral tablet: 1 tab(s) orally 2 times a day (Last dispensed 1/12/23 x 90 days).   magnesium oxide 250 mg oral tablet: Per Patient: 2 tab(s) orally 2 times a day (1000mg daily)  metFORMIN 500 mg oral tablet: 1 tab(s) orally 2 times a day   (Last dispensed Nov/2022 x 90 days).   Multiple Vitamins oral tablet: 1 tab(s) orally once a day  Ventolin HFA 90 mcg/inh inhalation aerosol: 2 puff(s) inhaled 4 times a day, As Needed  zinc sulfate 220 mg oral capsule: 1 cap(s) orally once a day   albuterol 90 mcg/inh inhalation aerosol: 2 puff(s) inhaled every 6 hours as needed   shortness of breath   ascorbic acid 500 mg oral tablet: 1 tab(s) orally once a day  atorvastatin 10 mg oral tablet: 1 tab(s) orally once a day (Last dispensed in Nov/2022 x 1 month).   benzonatate 100 mg oral capsule: 1 cap(s) orally 3 times a day as needed for cough   budesonide-formoterol 160 mcg-4.5 mcg/inh inhalation aerosol: 2 puff(s) inhaled 2 times a day  cholecalciferol oral tablet: 2000 unit(s) orally once a day  escitalopram 10 mg oral tablet: 1 tab(s) orally once a day  fluticasone 50 mcg/inh nasal spray: 1 spray(s) nasal once a day for 5 days  guaiFENesin 600 mg oral tablet, extended release: 1 tab(s) orally every 12 hours  for congeston  HumaLOG 100 units/mL injectable solution: 15 unit(s) injectable 3 times a day (before meals)  before meals   Levemir FlexPen 100 units/mL subcutaneous solution: 30 unit(s) subcutaneous once a day (at bedtime)   levoFLOXacin 750 mg oral tablet: 1 tab(s) orally every 24 hours  lisinopril 20 mg oral tablet: 1 tab(s) orally 2 times a day (Last dispensed 1/12/23 x 90 days).   magnesium oxide 250 mg oral tablet: Per Patient: 2 tab(s) orally 2 times a day (1000mg daily)  metFORMIN 500 mg oral tablet: 1 tab(s) orally 2 times a day   (Last dispensed Nov/2022 x 90 days).   Multiple Vitamins oral tablet: 1 tab(s) orally once a day  Ventolin HFA 90 mcg/inh inhalation aerosol: 2 puff(s) inhaled 4 times a day, As Needed  zinc sulfate 220 mg oral capsule: 1 cap(s) orally once a day   albuterol 90 mcg/inh inhalation aerosol: 2 puff(s) inhaled every 6 hours as needed   shortness of breath   ascorbic acid 500 mg oral tablet: 1 tab(s) orally once a day  atorvastatin 10 mg oral tablet: 1 tab(s) orally once a day (Last dispensed in Nov/2022 x 1 month).   benzonatate 100 mg oral capsule: 1 cap(s) orally 3 times a day as needed for cough   budesonide-formoterol 160 mcg-4.5 mcg/inh inhalation aerosol: 2 puff(s) inhaled 2 times a day  cholecalciferol oral tablet: 2000 unit(s) orally once a day  escitalopram 10 mg oral tablet: 1 tab(s) orally once a day  fluticasone 50 mcg/inh nasal spray: 1 spray(s) nasal once a day for 5 days  guaiFENesin 600 mg oral tablet, extended release: 1 tab(s) orally every 12 hours  for congeston  HumaLOG 100 units/mL injectable solution: 15 unit(s) injectable 3 times a day (before meals)  before meals   Insulin Pen Needles, 4mm: 1 application subcutaneously 4 times a day. ** Use with insulin pen **   Levemir FlexPen 100 units/mL subcutaneous solution: 30 unit(s) subcutaneous once a day (at bedtime)   levoFLOXacin 750 mg oral tablet: 1 tab(s) orally every 24 hours  lisinopril 20 mg oral tablet: 1 tab(s) orally 2 times a day (Last dispensed 1/12/23 x 90 days).   magnesium oxide 250 mg oral tablet: Per Patient: 2 tab(s) orally 2 times a day (1000mg daily)  metFORMIN 500 mg oral tablet: 1 tab(s) orally 2 times a day   (Last dispensed Nov/2022 x 90 days).   Multiple Vitamins oral tablet: 1 tab(s) orally once a day  U-100 Insulin Syringe, 1/2 mL: 1 application subcutaneously 2 times a day ** 1/2 mL holds up to 50 units of insulin **   Ventolin HFA 90 mcg/inh inhalation aerosol: 2 puff(s) inhaled 4 times a day, As Needed  zinc sulfate 220 mg oral capsule: 1 cap(s) orally once a day

## 2023-01-29 NOTE — PROGRESS NOTE ADULT - SUBJECTIVE AND OBJECTIVE BOX
[facility] Division of Hospital Medicine  Jovana Portillo MD  Pager (M-F, 8A-5P): [phone]  Other Times:  [phone]      SUBJECTIVE / OVERNIGHT EVENTS:    MEDICATIONS  (STANDING):  ascorbic acid 500 milliGRAM(s) Oral daily  atorvastatin 10 milliGRAM(s) Oral at bedtime  benzonatate 100 milliGRAM(s) Oral three times a day  budesonide 160 MICROgram(s)/formoterol 4.5 MICROgram(s) Inhaler 2 Puff(s) Inhalation two times a day  cholecalciferol 2000 Unit(s) Oral daily  dextrose 5%. 1000 milliLiter(s) (100 mL/Hr) IV Continuous <Continuous>  dextrose 5%. 1000 milliLiter(s) (50 mL/Hr) IV Continuous <Continuous>  dextrose 50% Injectable 25 Gram(s) IV Push once  dextrose 50% Injectable 12.5 Gram(s) IV Push once  dextrose 50% Injectable 25 Gram(s) IV Push once  enoxaparin Injectable 40 milliGRAM(s) SubCutaneous every 12 hours  escitalopram 10 milliGRAM(s) Oral daily  fluticasone propionate 50 MICROgram(s)/spray Nasal Spray      fluticasone propionate 50 MICROgram(s)/spray Nasal Spray 1 Spray(s) Both Nostrils two times a day  glucagon  Injectable 1 milliGRAM(s) IntraMuscular once  insulin glargine Injectable (LANTUS) 28 Unit(s) SubCutaneous at bedtime  insulin lispro (ADMELOG) corrective regimen sliding scale   SubCutaneous three times a day before meals  insulin lispro (ADMELOG) corrective regimen sliding scale   SubCutaneous at bedtime  insulin lispro Injectable (ADMELOG) 15 Unit(s) SubCutaneous three times a day before meals  lisinopril 20 milliGRAM(s) Oral two times a day  multivitamin 1 Tablet(s) Oral daily  remdesivir  IVPB   IV Intermittent   remdesivir  IVPB 100 milliGRAM(s) IV Intermittent every 24 hours  sodium chloride 0.9%. 1000 milliLiter(s) (100 mL/Hr) IV Continuous <Continuous>  zinc sulfate 220 milliGRAM(s) Oral daily    MEDICATIONS  (PRN):  acetaminophen     Tablet .. 650 milliGRAM(s) Oral every 6 hours PRN Temp greater or equal to 38C (100.4F), Mild Pain (1 - 3)  albuterol    90 MICROgram(s) HFA Inhaler 2 Puff(s) Inhalation every 6 hours PRN Shortness of Breath and/or Wheezing  dextrose Oral Gel 15 Gram(s) Oral once PRN Blood Glucose LESS THAN 70 milliGRAM(s)/deciliter  guaiFENesin Oral Liquid (Sugar-Free) 100 milliGRAM(s) Oral every 6 hours PRN Cough      I&O's Summary      PHYSICAL EXAM:  Vital Signs Last 24 Hrs  T(C): 36.9 (29 Jan 2023 11:54), Max: 37.1 (28 Jan 2023 20:00)  T(F): 98.5 (29 Jan 2023 11:54), Max: 98.7 (28 Jan 2023 20:00)  HR: 68 (29 Jan 2023 11:54) (66 - 90)  BP: 107/60 (29 Jan 2023 11:54) (107/60 - 154/88)  BP(mean): --  RR: 18 (29 Jan 2023 11:54) (16 - 19)  SpO2: 100% (29 Jan 2023 11:54) (98% - 100%)    Parameters below as of 29 Jan 2023 11:54  Patient On (Oxygen Delivery Method): room air      CONSTITUTIONAL: NAD, well-developed, well-groomed  EYES: PERRLA; conjunctiva and sclera clear  ENMT: Moist oral mucosa, no pharyngeal injection or exudates; normal dentition  RESPIRATORY: Normal respiratory effort; lungs are clear to auscultation bilaterally  CARDIOVASCULAR: Regular rate and rhythm, normal S1 and S2, no murmur/rub/gallop; No lower extremity edema; Peripheral pulses are 2+ bilaterally  ABDOMEN: Nontender to palpation, normoactive bowel sounds, no rebound/guarding; No hepatosplenomegaly  PSYCH: A+O to person, place, and time; affect appropriate  SKIN: No rashes; no palpable lesions    LABS:                        10.9   11.44 )-----------( 206      ( 29 Jan 2023 05:47 )             35.8     01-29    134<L>  |  99  |  33<H>  ----------------------------<  301<H>  4.1   |  26  |  0.73    Ca    9.2      29 Jan 2023 05:47  Phos  3.5     01-29  Mg     1.70     01-29    TPro  6.4  /  Alb  3.2<L>  /  TBili  <0.2  /  DBili  x   /  AST  14  /  ALT  17  /  AlkPhos  92  01-29    PT/INR - ( 28 Jan 2023 06:30 )   PT: 10.8 sec;   INR: 0.93 ratio         PTT - ( 28 Jan 2023 06:30 )  PTT:29.0 sec          Culture - Urine (collected 26 Jan 2023 19:16)  Source: Clean Catch Clean Catch (Midstream)  Final Report (28 Jan 2023 07:37):    <10,000 CFU/mL Normal Urogenital Vanessa        RADIOLOGY & ADDITIONAL TESTS:  Results Reviewed:   Imaging Personally Reviewed:  Electrocardiogram Personally Reviewed:    COORDINATION OF CARE:  Care Discussed with Consultants/Other Providers [Y/N]:  Prior or Outpatient Records Reviewed [Y/N]:   [facility] Hospital Medicine  Jovana Portillo MD  Pager (M-F, 8A-5P): [phone]  Other Times:  [phone]      SUBJECTIVE / OVERNIGHT EVENTS:AINSLEY, endorses ongoing coughing, worse at night. Says her sputum is now green. No oxygen requirement overnight, no wheezing.    MEDICATIONS  (STANDING):  ascorbic acid 500 milliGRAM(s) Oral daily  atorvastatin 10 milliGRAM(s) Oral at bedtime  benzonatate 100 milliGRAM(s) Oral three times a day  budesonide 160 MICROgram(s)/formoterol 4.5 MICROgram(s) Inhaler 2 Puff(s) Inhalation two times a day  cholecalciferol 2000 Unit(s) Oral daily  dextrose 5%. 1000 milliLiter(s) (100 mL/Hr) IV Continuous <Continuous>  dextrose 5%. 1000 milliLiter(s) (50 mL/Hr) IV Continuous <Continuous>  dextrose 50% Injectable 25 Gram(s) IV Push once  dextrose 50% Injectable 12.5 Gram(s) IV Push once  dextrose 50% Injectable 25 Gram(s) IV Push once  enoxaparin Injectable 40 milliGRAM(s) SubCutaneous every 12 hours  escitalopram 10 milliGRAM(s) Oral daily  fluticasone propionate 50 MICROgram(s)/spray Nasal Spray      fluticasone propionate 50 MICROgram(s)/spray Nasal Spray 1 Spray(s) Both Nostrils two times a day  glucagon  Injectable 1 milliGRAM(s) IntraMuscular once  insulin glargine Injectable (LANTUS) 28 Unit(s) SubCutaneous at bedtime  insulin lispro (ADMELOG) corrective regimen sliding scale   SubCutaneous three times a day before meals  insulin lispro (ADMELOG) corrective regimen sliding scale   SubCutaneous at bedtime  insulin lispro Injectable (ADMELOG) 15 Unit(s) SubCutaneous three times a day before meals  lisinopril 20 milliGRAM(s) Oral two times a day  multivitamin 1 Tablet(s) Oral daily  remdesivir  IVPB   IV Intermittent   remdesivir  IVPB 100 milliGRAM(s) IV Intermittent every 24 hours  sodium chloride 0.9%. 1000 milliLiter(s) (100 mL/Hr) IV Continuous <Continuous>  zinc sulfate 220 milliGRAM(s) Oral daily    MEDICATIONS  (PRN):  acetaminophen     Tablet .. 650 milliGRAM(s) Oral every 6 hours PRN Temp greater or equal to 38C (100.4F), Mild Pain (1 - 3)  albuterol    90 MICROgram(s) HFA Inhaler 2 Puff(s) Inhalation every 6 hours PRN Shortness of Breath and/or Wheezing  dextrose Oral Gel 15 Gram(s) Oral once PRN Blood Glucose LESS THAN 70 milliGRAM(s)/deciliter  guaiFENesin Oral Liquid (Sugar-Free) 100 milliGRAM(s) Oral every 6 hours PRN Cough      I&O's Summary      PHYSICAL EXAM:  Vital Signs Last 24 Hrs  T(C): 36.9 (29 Jan 2023 11:54), Max: 37.1 (28 Jan 2023 20:00)  T(F): 98.5 (29 Jan 2023 11:54), Max: 98.7 (28 Jan 2023 20:00)  HR: 68 (29 Jan 2023 11:54) (66 - 90)  BP: 107/60 (29 Jan 2023 11:54) (107/60 - 154/88)  BP(mean): --  RR: 18 (29 Jan 2023 11:54) (16 - 19)  SpO2: 100% (29 Jan 2023 11:54) (98% - 100%)    Parameters below as of 29 Jan 2023 11:54  Patient On (Oxygen Delivery Method): room air      CONSTITUTIONAL: NAD, well-developed, well-groomed  EYES: PERRLA; conjunctiva and sclera clear  ENMT: Moist oral mucosa, no pharyngeal injection or exudates; normal dentition  RESPIRATORY: Normal respiratory effort; faint end expiratory wheezing at the L posterior lower lung field is now resolved  CARDIOVASCULAR: Regular rate and rhythm, normal S1 and S2, no murmur/rub/gallop; No lower extremity edema; Peripheral pulses are 2+ bilaterally  ABDOMEN: Nontender to palpation, normoactive bowel sounds, no rebound/guarding; No hepatosplenomegaly  PSYCH: A+O to person, place, and time; affect appropriate  SKIN: No rashes; no palpable lesions, acanthosis nigricans    LABS:                        10.9   11.44 )-----------( 206      ( 29 Jan 2023 05:47 )             35.8     01-29    134<L>  |  99  |  33<H>  ----------------------------<  301<H>  4.1   |  26  |  0.73    Ca    9.2      29 Jan 2023 05:47  Phos  3.5     01-29  Mg     1.70     01-29    TPro  6.4  /  Alb  3.2<L>  /  TBili  <0.2  /  DBili  x   /  AST  14  /  ALT  17  /  AlkPhos  92  01-29    PT/INR - ( 28 Jan 2023 06:30 )   PT: 10.8 sec;   INR: 0.93 ratio         PTT - ( 28 Jan 2023 06:30 )  PTT:29.0 sec          Culture - Urine (collected 26 Jan 2023 19:16)  Source: Clean Catch Clean Catch (Midstream)  Final Report (28 Jan 2023 07:37):    <10,000 CFU/mL Normal Urogenital Vanessa        RADIOLOGY & ADDITIONAL TESTS:  Results Reviewed:   Imaging Personally Reviewed:  Electrocardiogram Personally Reviewed:    COORDINATION OF CARE:  Care Discussed with Consultants/Other Providers [Y/N]: Endo  Prior or Outpatient Records Reviewed [Y/N]:

## 2023-01-29 NOTE — DISCHARGE NOTE PROVIDER - NSDCCPTREATMENT_GEN_ALL_CORE_FT
PRINCIPAL PROCEDURE  Procedure: Echo 2D  Findings and Treatment: 1. Normal mitral valve. Minimal mitral regurgitation.  2. Normal left ventricular internal dimensions and wall  thicknesses.  3. Endocardium not well visualized; grossly normal left  ventricular systolic function.  4. The right ventricle is not well visualized; grossly  normal right ventricular systolic function.

## 2023-01-29 NOTE — DISCHARGE NOTE PROVIDER - HOSPITAL COURSE
52 year old female, with past history significant for Reactive airway disease (ground zero), Pneumonia (multiple, last hospitalized 2 years ago), Type-2 DM (A1c of 11), SBO, GERD, Herniated disc, Hypothyroidism, HTN, HLD, LVH, Mitral valve prolapse, Obesity, and PCOS, presented to the ED secondary to shortness of breath.  Diagnosed with 2019 Novel Coronavirus Disease in the ED. High risk of progression to severe disease, remdesivir (1/27-1/29). S/p solumedrol for reactive airway disease. Subsequent steroid induced hyperglycemia. 52 year old female, with past history significant for Reactive airway disease (ground zero), Pneumonia (multiple, last hospitalized 2 years ago), Type-2 DM (A1c of 11), SBO, GERD, Herniated disc, Hypothyroidism, HTN, HLD, LVH, Mitral valve prolapse, Obesity, and PCOS, presented to the ED secondary to shortness of breath.  Diagnosed with 2019 Novel Coronavirus Disease in the ED. High risk of progression to severe disease, remdesivir (1/27-1/29). S/p solumedrol for reactive airway disease. Subsequent steroid induced hyperglycemia.     Exacerbation of reactive airway disease.    - worsening shortness of breath, along with fever, chills, body aches and fatigue, over the past 3 to 4 days, in the context of COVID-19 infection and chronic reactive airway disease - as sequela of being 9-11/Ground [place];   - has had multiple episodes of pneumonia in the past  - unlikely of cardiac origin, but needs further eval (hx palpitations, HTN)- follows with OP Cardiologist EP, diagnosed w MVP, holter monitor was nml per pt  - s/p Solu-medrol 60 mg IV Q8H x 3 doses   - Remdesivir 1/27-1/29  - Pulm consult for reactive airway disease and ground zero patient-- astrid wolff's cont above therapies.       Left-sided chest pain.   - INTERMITTENT  - CXR w/o any acute findings   -ef 66% 1. Normal mitral valve. Minimal mitral regurgitation.  2. Normal left ventricular internal dimensions and wall  thicknesses.  3. Endocardium not well visualized; grossly normal left  ventricular systolic function.  4. The right ventricle is not well visualized; grossly  normal right ventricular systolic function.    Uncontrolled type 2 diabetes mellitus with hyperglycemia, with long-term current use of insulin.   - reports being diagnosed with Syndrome X in the past  -diabetes medications adjusted while in patient   -  A1c  11%.  follow up with endocrine, optho, podiatry call for appointments     Hypomagnesemia.    - chronic issue and is on magnesium  - resume oral magnesium oxide       Trigeminal neuralgia.    - reports taking Oxcarbazepine and gabapentin, --> she says these are not active medications for her.    Lung nodule.   - unclear location, but is sequela of [place]/9-11 exposure, per patient  - patient thinks last measurement was ~ 9mm (unsure)  - has not been able to follow-up  with scans Q3 to Q6 months for the past ~ one year  - follow up with your PCP and or pulmonologist to determine when repeat CT should be scheduled     Discussed with attending ready for discharge        52 year old female, with past history significant for Reactive airway disease (ground zero), Pneumonia (multiple, last hospitalized 2 years ago), Type-2 DM (A1c of 11), SBO, GERD, Herniated disc, Hypothyroidism, HTN, HLD, LVH, Mitral valve prolapse, Obesity, and PCOS, presented to the ED secondary to shortness of breath.  Diagnosed with 2019 Novel Coronavirus Disease in the ED. High risk of progression to severe disease, remdesivir (1/27-1/29). S/p solumedrol for reactive airway disease. Subsequent steroid induced hyperglycemia.     Exacerbation of reactive airway disease 2/2 COVID  -s/p remdesivir and solumedrol   -started Levaquin for 5 day course   - supportive care with mucinex, albuterol standing, tessalon perle  - Ambulatory saturation 98%   - pt reluctant to discharged, has been wanting to stay another day for last 2 days, b/c of overall feeling unwell. However discussed there she does not need a medical admission at this time, physical symptoms, such as fatigue, exhaustion, congestion from COVID can take weeks to months to improve. Advised pt to see her PCP or pulmonologist in 1-2 weeks upon discharge for evaluation and repeat COVID PCR if desired, prior to seeing new grandchild.        Left-sided chest pain. resolved    -ef 66% 1. Normal mitral valve. Minimal mitral regurgitation.  2. Normal left ventricular internal dimensions and wall  thicknesses.  3. Endocardium not well visualized; grossly normal left  ventricular systolic function.  4. The right ventricle is not well visualized; grossly  normal right ventricular systolic function.    Uncontrolled type 2 diabetes mellitus with hyperglycemia, with long-term current use of insulin.   - reports being diagnosed with Syndrome X in the past  -diabetes medications adjusted while in patient   -  A1c  11%.  follow up with endocrine, optho, podiatry call for appointments     Hypomagnesemia.    - chronic issue and is on magnesium  - resume oral magnesium oxide       Trigeminal neuralgia.    - reports taking Oxcarbazepine and gabapentin, --> she says these are not active medications for her.    Lung nodule.   - unclear location, but is sequela of [place]/9-11 exposure, per patient  - patient thinks last measurement was ~ 9mm (unsure)  - has not been able to follow-up  with scans Q3 to Q6 months for the past ~ one year  - follow up with your PCP and or pulmonologist to determine when repeat CT should be scheduled     Discussed with attending ready for discharge

## 2023-01-29 NOTE — DISCHARGE NOTE PROVIDER - ATTENDING DISCHARGE PHYSICAL EXAMINATION:
CONSTITUTIONAL: NAD, well-developed, well-groomed  EYES: EOMI; conjunctiva and sclera clear  ENMT: Moist oral mucosa, no pharyngeal injection or exudates; normal dentition  RESPIRATORY: Normal respiratory effort; no wheeze   CARDIOVASCULAR: Regular rate and rhythm, normal S1 and S2, no murmur/rub/gallop  ABDOMEN: Nontender to palpation, normoactive bowel sounds, soft   PSYCH: A+O to person, place, and time; affect appropriate  SKIN: No rashes; no palpable lesions, acanthosis nigricans

## 2023-01-29 NOTE — DISCHARGE NOTE PROVIDER - NSFOLLOWUPCLINICS_GEN_ALL_ED_FT
[facility]  Endocrinology  [address] [place] [address]  Phone: [phone]  Fax:

## 2023-01-29 NOTE — DISCHARGE NOTE PROVIDER - NSDCCPCAREPLAN_GEN_ALL_CORE_FT
PRINCIPAL DISCHARGE DIAGNOSIS  Diagnosis: 2019 novel coronavirus disease (COVID-19)  Assessment and Plan of Treatment: You completed a 3 day course of remdesivir for COVID-19 infection with risk factors for progression to severe disease. You completed 3 doses of solumedrol for an exacerbation of your reactive airway disease.      SECONDARY DISCHARGE DIAGNOSES  Diagnosis: Steroid-induced hyperglycemia  Assessment and Plan of Treatment: Follow up with [facility] Endocrinology. Call [phone] to schedule an appointment with a diabetes educator and also an appointment with an NP or MD next week (first available).   Resume taking your metformiin 1g BID. For you basal insulin you should take 30 units nightly and for your pre mealtime insulin you should take 14 units three times a day.     PRINCIPAL DISCHARGE DIAGNOSIS  Diagnosis: 2019 novel coronavirus disease (COVID-19)  Assessment and Plan of Treatment: You completed a 3 day course of remdesivir for COVID-19 infection given you history of risk factors for progression to severe disease. You completed 3 doses of solumedrol for an exacerbation of your reactive airway disease.      SECONDARY DISCHARGE DIAGNOSES  Diagnosis: Steroid-induced hyperglycemia  Assessment and Plan of Treatment: Follow up with [facility] Endocrinology. Call [phone] to schedule an appointment with a diabetes educator and also an appointment with an NP or MD next week (first available).   Resume taking your metformiin 1g BID. For you basal insulin you should take 30 units levemir nightly and for your pre mealtime insulin (Humalog) you should take 14 units three times a day.     PRINCIPAL DISCHARGE DIAGNOSIS  Diagnosis: 2019 novel coronavirus disease (COVID-19)  Assessment and Plan of Treatment: You completed a 3 day course of remdesivir for COVID-19 infection given your history of risk factors for progression to severe disease. You completed 3 doses of solumedrol for an exacerbation of your reactive airway disease as well as remdesivir      SECONDARY DISCHARGE DIAGNOSES  Diagnosis: Steroid-induced hyperglycemia  Assessment and Plan of Treatment: Follow up with [facility] Endocrinology. Call [phone] to schedule an appointment with a diabetes educator and also an appointment with an NP or MD next week (first available).   Resume taking your metformiin 1g BID. For you basal insulin you should take 30 units levemir nightly and for your pre mealtime insulin (Humalog) you should take 14 units three times a day.     PRINCIPAL DISCHARGE DIAGNOSIS  Diagnosis: 2019 novel coronavirus disease (COVID-19)  Assessment and Plan of Treatment: You completed a 3 day course of remdesivir for COVID-19 infection given your history of risk factors for progression to severe disease. You completed 3 doses of solumedrol for an exacerbation of your reactive airway disease as well as remdesivir      SECONDARY DISCHARGE DIAGNOSES  Diagnosis: Steroid-induced hyperglycemia  Assessment and Plan of Treatment: Follow up with [facility] Endocrinology. Call [phone] to schedule an appointment with a diabetes educator and also an appointment with an NP or MD next week (first available).   Resume taking your metformiin 1g BID. For you basal insulin you should take 30 units levemir nightly and for your pre mealtime insulin (Humalog) you should take 15 units three times a day.  follow up with endocrine, optho, podiatry call for appointments    Diagnosis: History of medical treatment  Assessment and Plan of Treatment: Exacerbation of reactive airway disease.   - s/p Solu-medrol 60 mg IV Q8H x 3 doses   - Remdesivir 1/27-1/29  take your medicaion as prescribed, follow up with your PCP and pulmonologist call for appointment   seek medical attention if you develop changes in your respiratory status    Left-sided chest pain.   - CXR w/o any acute findings   -ef 66% 1. Normal mitral valve. Minimal mitral regurgitation.  2. Normal left ventricular internal dimensions and wall  thicknesses.  3. Endocardium not well visualized; grossly normal left  ventricular systolic function.  4. The right ventricle is not well visualized; grossly  normal right ventricular systolic function.  follow up with your cardiologist call for appointment   if you develop worsening chest, jaw, arm, back pain seek immediate medical attention   Uncontrolled type 2 diabetes mellitus with hyperglycemia, with long-term current use of insulin.   - reports being diagnosed with Syndrome X in the past  -diabetes medications adjusted while in patient   -  A1c  11%.  follow up with endocrine, optho, podiatry call for appointments   Hypomagnesemia.    - chronic issue and is on magnesium  - resume oral magnesium oxide   follow up with your PCP call for appointment   Trigeminal neuralgia.    - reports taking Oxcarbazepine and gabapentin, --> she says these are not active medications for her.  follow up with neurology as needed   Lung nodule.   - unclear location, but is sequela of [place]/9-11 exposure,  - follow up with your PCP and or pulmonologist to determine when repeat CT should be scheduled        PRINCIPAL DISCHARGE DIAGNOSIS  Diagnosis: 2019 novel coronavirus disease (COVID-19)  Assessment and Plan of Treatment: You completed a 3 day course of remdesivir for COVID-19 infection given your history of risk factors for progression to severe disease. You completed 3 doses of solumedrol for an exacerbation of your reactive airway disease as well as remdesivir.   Your saturation with ambulation was 98%.      SECONDARY DISCHARGE DIAGNOSES  Diagnosis: Steroid-induced hyperglycemia  Assessment and Plan of Treatment: Your A1c is 11%.   Follow up with [facility] Endocrinology. Call [phone] to schedule an appointment with a diabetes educator and also an appointment with an NP or MD next week (first available).   Resume taking your metformin 1g BID. For you basal insulin you should take 30 units levemir nightly and for your pre mealtime insulin (Humalog) you should take 15 units three times a day.    Diagnosis: Uncontrolled type 2 diabetes mellitus with hyperglycemia, with long-term current use of insulin  Assessment and Plan of Treatment: Your A1c is 11%.   Follow up with [facility] Endocrinology. Call [phone] to schedule an appointment with a diabetes educator and also an appointment with an NP or MD next week (first available).   Resume taking your metformin 1g BID. For you basal insulin you should take 30 units levemir nightly and for your pre mealtime insulin (Humalog) you should take 15 units three times a day.    Diagnosis: Exacerbation of reactive airway disease  Assessment and Plan of Treatment: This was due to COVID, you were treated with steroids.   Continue albuterol inhaler standing if needed for symptoms.   take your medicaion as prescribed, follow up with your PCP and pulmonologist call for appointment   seek medical attention if you develop changes in your respiratory status    Diagnosis: Lung nodule  Assessment and Plan of Treatment: Pre-existing, sequela of [place]/9-11 exposure,  follow up with your PCP and or pulmonologist to determine when repeat CT should be scheduled       Diagnosis: Left-sided chest pain  Assessment and Plan of Treatment: This was likely due to your viral illness.   CXR was clear, you echocardiogram showed normal right and left heart function.   follow up with your cardiologist call for appointment   if you develop worsening chest, jaw, arm, back pain seek immediate medical attention

## 2023-01-29 NOTE — DISCHARGE NOTE PROVIDER - NSDCFUADDAPPT_GEN_ALL_CORE_FT
Follow up with your pulmonologist outpatient  Follow up with your pulmonologist outpatient     follow up with podiatry and optho outpatient re your diabetes   Follow up with your pulmonologist outpatient call for appointment     follow up with podiatry and optho outpatient re your diabetes

## 2023-01-30 LAB
GLUCOSE BLDC GLUCOMTR-MCNC: 161 MG/DL — HIGH (ref 70–99)
GLUCOSE BLDC GLUCOMTR-MCNC: 185 MG/DL — HIGH (ref 70–99)
GLUCOSE BLDC GLUCOMTR-MCNC: 203 MG/DL — HIGH (ref 70–99)
GLUCOSE BLDC GLUCOMTR-MCNC: 206 MG/DL — HIGH (ref 70–99)

## 2023-01-30 PROCEDURE — 99232 SBSQ HOSP IP/OBS MODERATE 35: CPT

## 2023-01-30 RX ORDER — IPRATROPIUM/ALBUTEROL SULFATE 18-103MCG
3 AEROSOL WITH ADAPTER (GRAM) INHALATION EVERY 6 HOURS
Refills: 0 | Status: DISCONTINUED | OUTPATIENT
Start: 2023-01-30 | End: 2023-01-30

## 2023-01-30 RX ORDER — ALBUTEROL 90 UG/1
2 AEROSOL, METERED ORAL EVERY 6 HOURS
Refills: 0 | Status: DISCONTINUED | OUTPATIENT
Start: 2023-01-30 | End: 2023-01-31

## 2023-01-30 RX ADMIN — Medication 2: at 08:38

## 2023-01-30 RX ADMIN — Medication 15 UNIT(S): at 17:50

## 2023-01-30 RX ADMIN — ENOXAPARIN SODIUM 40 MILLIGRAM(S): 100 INJECTION SUBCUTANEOUS at 12:42

## 2023-01-30 RX ADMIN — Medication 100 MILLIGRAM(S): at 21:10

## 2023-01-30 RX ADMIN — Medication 500 MILLIGRAM(S): at 12:39

## 2023-01-30 RX ADMIN — ENOXAPARIN SODIUM 40 MILLIGRAM(S): 100 INJECTION SUBCUTANEOUS at 23:06

## 2023-01-30 RX ADMIN — Medication 2: at 17:51

## 2023-01-30 RX ADMIN — Medication 2000 UNIT(S): at 12:38

## 2023-01-30 RX ADMIN — Medication 100 MILLIGRAM(S): at 12:38

## 2023-01-30 RX ADMIN — Medication 1 SPRAY(S): at 17:50

## 2023-01-30 RX ADMIN — ZINC SULFATE TAB 220 MG (50 MG ZINC EQUIVALENT) 220 MILLIGRAM(S): 220 (50 ZN) TAB at 12:39

## 2023-01-30 RX ADMIN — BUDESONIDE AND FORMOTEROL FUMARATE DIHYDRATE 2 PUFF(S): 160; 4.5 AEROSOL RESPIRATORY (INHALATION) at 22:05

## 2023-01-30 RX ADMIN — ALBUTEROL 2 PUFF(S): 90 AEROSOL, METERED ORAL at 22:03

## 2023-01-30 RX ADMIN — Medication 1 SPRAY(S): at 05:17

## 2023-01-30 RX ADMIN — Medication 100 MILLIGRAM(S): at 21:11

## 2023-01-30 RX ADMIN — Medication 1 TABLET(S): at 12:38

## 2023-01-30 RX ADMIN — LISINOPRIL 20 MILLIGRAM(S): 2.5 TABLET ORAL at 05:18

## 2023-01-30 RX ADMIN — Medication 100 MILLIGRAM(S): at 05:17

## 2023-01-30 RX ADMIN — INSULIN GLARGINE 30 UNIT(S): 100 INJECTION, SOLUTION SUBCUTANEOUS at 22:03

## 2023-01-30 RX ADMIN — LISINOPRIL 20 MILLIGRAM(S): 2.5 TABLET ORAL at 17:55

## 2023-01-30 RX ADMIN — Medication 15 UNIT(S): at 12:40

## 2023-01-30 RX ADMIN — ESCITALOPRAM OXALATE 10 MILLIGRAM(S): 10 TABLET, FILM COATED ORAL at 12:38

## 2023-01-30 RX ADMIN — Medication 15 UNIT(S): at 08:39

## 2023-01-30 RX ADMIN — BUDESONIDE AND FORMOTEROL FUMARATE DIHYDRATE 2 PUFF(S): 160; 4.5 AEROSOL RESPIRATORY (INHALATION) at 08:40

## 2023-01-30 RX ADMIN — Medication 100 MILLIGRAM(S): at 05:18

## 2023-01-30 RX ADMIN — Medication 4: at 12:41

## 2023-01-30 RX ADMIN — ALBUTEROL 2 PUFF(S): 90 AEROSOL, METERED ORAL at 14:40

## 2023-01-30 RX ADMIN — ATORVASTATIN CALCIUM 10 MILLIGRAM(S): 80 TABLET, FILM COATED ORAL at 21:11

## 2023-01-30 NOTE — PROGRESS NOTE ADULT - PROBLEM SELECTOR PLAN 5
- chronic issue and is on magnesium up to 1000 mg daily, per patient  - magnesium currently 1.4; supplemented w/ 2 grams magnesium sulfate  - resume oral magnesium oxide 500 mg BID at d/c

## 2023-01-30 NOTE — PROVIDER CONTACT NOTE (OTHER) - BACKGROUND
patient admitted for covid
patient covid positive, on IV steriods and also ate her full breakfast this morning

## 2023-01-30 NOTE — PROGRESS NOTE ADULT - NSPROGADDITIONALINFOA_GEN_ALL_CORE
Greater than 50 minutes spent with patient and on patient's care plan.
Greater than 50 minutes spent with patient and on patient's care plan.

## 2023-01-30 NOTE — PROGRESS NOTE ADULT - REASON FOR ADMISSION
2019 Novel coronavirus disease

## 2023-01-30 NOTE — PROVIDER CONTACT NOTE (OTHER) - SITUATION
Pt with elevated blood glucose of 456. lantus given per eMAR
patient refusing labs this morning; states she is a hard stick
patient

## 2023-01-30 NOTE — PROGRESS NOTE ADULT - PROBLEM SELECTOR PROBLEM 1
Exacerbation of reactive airway disease

## 2023-01-30 NOTE — PROVIDER CONTACT NOTE (OTHER) - ACTION/TREATMENT ORDERED:
patient given insulin as ordered will recheck in an hour
notify provider, continue to monitor.
Pt will be ordered for additional ademelog

## 2023-01-30 NOTE — PROGRESS NOTE ADULT - PROBLEM SELECTOR PROBLEM 4
Uncontrolled type 2 diabetes mellitus with hyperglycemia, with long-term current use of insulin

## 2023-01-30 NOTE — PROVIDER CONTACT NOTE (OTHER) - ASSESSMENT
no acute changes noted patient currently resting in bed comfortably
Pt asymptomatic. received steroids during the day
patient resting comfortably in bed

## 2023-01-30 NOTE — PROGRESS NOTE ADULT - PROBLEM SELECTOR PLAN 6
- patient unable to names/dosages of all medications  - Med-Hx Pharmacist e-mailed; please follow-up and complete Med-Rec
- patient unable to names/dosages of all medications  - Med-Hx Pharmacist e-mailed; please follow-up and complete Med-Rec
- reports taking Oxcarbazepine and gabapentin, but unable to recall dosages--> she says these are not active medications for her
- patient unable to names/dosages of all medications  - Med-Hx Pharmacist e-mailed; please follow-up and complete Med-Rec

## 2023-01-30 NOTE — PROGRESS NOTE ADULT - ASSESSMENT
Pleasant  52 year old female, with past history significant for Reactive airway disease (ground zero), Pneumonia (multiple, last hospitalized 2 years ago), Type-2 DM (A1c of 11), SBO, GERD, Herniated disc, Hypothyroidism, HTN, HLD, LVH, Mitral valve prolapse, Obesity, and PCOS, presented to the ED secondary to shortness of breath.  Diagnosed with 2019 Novel Coronavirus Disease in the ED. High risk of progression to severe disease, remdesivir (1/27-1/29). S/p solumedrol for reactive airway disease. Subsequent steroid induced hyperglycemia. Endo curbside on 1/29.
  52 year old female, with past history significant for Reactive airway disease (ground zero), Pneumonia (multiple, last hospitalized 2 years ago), Type-2 DM (A1c of 11), SBO, GERD, Herniated disc, Hypothyroidism, HTN, HLD, LVH, Mitral valve prolapse, Obesity, and PCOS, presented to the ED secondary to shortness of breath.  Diagnosed with 2019 Novel Coronavirus Disease in the ED. High risk of progression to severe disease, remdesivir (1/27-1/29). S/p solumedrol for reactive airway disease. Subsequent steroid induced hyperglycemia. Consult Endo on 1/29 prior to pt's d/c. 
  52 year old female, with past history significant for Reactive airway disease (ground zero), Pneumonia (multiple, last hospitalized 2 years ago), Type-2 DM (A1c of 11), SBO, GERD, Herniated disc, Hypothyroidism, HTN, HLD, LVH, Mitral valve prolapse, Obesity, and PCOS, presented to the ED secondary to shortness of breath.  Diagnosed with 2019 Novel Coronavirus Disease in the ED. High risk of progression to severe disease, remdesivir (1/27-1/29). S/p solumedrol for reactive airway disease. Subsequent steroid induced hyperglycemia.
Pleasant  52 year old female, with past history significant for Reactive airway disease (ground zero), Pneumonia (multiple, last hospitalized 2 years ago), Type-2 DM (A1c of 11), SBO, GERD, Herniated disc, Hypothyroidism, HTN, HLD, LVH, Mitral valve prolapse, Obesity, and PCOS, presented to the ED secondary to shortness of breath.  Diagnosed with 2019 Novel Coronavirus Disease in the ED. High risk of progression to severe disease, remdesivir (1/27-1/29). S/p solumedrol for reactive airway disease. Subsequent steroid induced hyperglycemia. Endo curbside on 1/29.

## 2023-01-30 NOTE — PROGRESS NOTE ADULT - PROBLEM SELECTOR PLAN 3
INTERMITTENT  - sharp and somewhat piercing  - likely pleuritic, in the setting of lung infection, coughing  - troponin = 7, Pro-BNP = 69  - CXR w/o any acute findings   - f/u TTE (ordered)-- pEF, minimal mitral regurg
RESOLVED  - sharp and somewhat piercing  - likely pleuritic, in the setting of lung infection, coughing  - troponin = 7, Pro-BNP = 69 (may be falsely low, in the setting of obesity)  - CXR w/o any acute findings - including effusions, congestive changes (personally reviewed)  - f/u TTE (ordered)
INTERMITTENT  - sharp and somewhat piercing  - likely pleuritic, in the setting of lung infection, coughing  - troponin = 7, Pro-BNP = 69 (may be falsely low, in the setting of obesity)  - CXR w/o any acute findings - including effusions, congestive changes (personally reviewed)  - f/u TTE (ordered)-- pEF, minimal mitral regurg
INTERMITTENT  - sharp and somewhat piercing  - likely pleuritic, in the setting of lung infection, coughing  - troponin = 7, Pro-BNP = 69  - CXR w/o any acute findings   - TTE -pEF, minimal mitral regurg

## 2023-01-30 NOTE — PROGRESS NOTE ADULT - PROBLEM SELECTOR PLAN 7
- reports taking Oxcarbazepine and gabapentin, but unable to recall dosages--> she says these are not active medications for her
- reports taking Oxcarbazepine and gabapentin, but unable to recall dosages--> she says these are not active medications for her
- unclear location, but is sequela of [place]/9[place] exposure, per patient  - patient thinks last measurement was ~ 9mm (unsure)  - has not been able to follow-up  with scans Q3 to Q6 months for the past ~ one year  - repeat CT as outpatient
- reports taking Oxcarbazepine and gabapentin, but unable to recall dosages--> she says these are not active medications for her  - please f/u w/ Med-Hx Pharmacist for dosages

## 2023-01-30 NOTE — PROGRESS NOTE ADULT - PROBLEM SELECTOR PLAN 1
- worsening shortness of breath, along with fever, chills, body aches and fatigue, over the past 3 to 4 days, in the context of COVID-19 infection and chronic reactive airway disease - as sequela of being 9-11/[place]; wheezing on 1/27  - has had multiple episodes of pneumonia in the past  - unlikely of cardiac origin, but needs further eval (hx palpitations, HTN)- follows with OP Cardiologist EP, diagnosed w MVP, holter monitor was nml per pt  - non-responsive to bronchodilator therapies at home-- takes symbicort and albuterol   - respiratory acidosis present (pH = 7.31, pCO2 = 60).  Lactate = 1.8  - given Solu-medrol 125 mg x one in the ED, as well as duo-neb x one  - s/p Solu-medrol 60 mg IV Q8H x 3 doses   - supplemental oxygen PRN, incentive una  - HOB elevation, incentive una  - Remdesivir 1/27-1/29  - Pulm consult for reactive airway disease and ground zero patient-- mariella, rec's cont above therapies.  - start levaquin x5 days for possible bronchitis, change albuterol HFA to standing q6   - sputum cx sent

## 2023-01-30 NOTE — PROGRESS NOTE ADULT - PROBLEM SELECTOR PLAN 2
- persistent shortness of breath x 3 to 4 days, not ameliorated w/ home bronchodilator therapies  - CXR w/o any acute findings   - maintaining good O2 sat on room air; supplemental oxygen via nasal cannula, as needed  - given Solu-medrol and duo-neb in the ED   - Tylenol PRN fever, mild pain
- persistent shortness of breath x 3 to 4 days, not ameliorated w/ home bronchodilator therapies  - d-dimer = 224, trop = 7, lactate = 1.8  - CXR w/o any acute findings   - maintaining good O2 sat on room air; supplemental oxygen via nasal cannula, as needed  - given Solu-medrol and duo-neb in the ED   - Tylenol PRN fever, mild pain  - supportive care, as needed
- persistent shortness of breath x 3 to 4 days, not ameliorated w/ home bronchodilator therapies  - d-dimer = 224, trop = 7, lactate = 1.8  - CXR w/o any acute findings   - maintaining good O2 sat on room air; supplemental oxygen via nasal cannula, as needed  - given Solu-medrol and duo-neb in the ED   - Tylenol PRN fever, mild pain  - supportive care, as needed
- persistent shortness of breath x 3 to 4 days, not ameliorated w/ home bronchodilator therapies  - d-dimer = 224, trop = 7, lactate = 1.8  - CXR w/o any acute findings   - maintaining good O2 sat on room air; supplemental oxygen via nasal cannula, as needed  - given Solu-medrol and duo-neb in the ED   - Tylenol PRN fever, mild pain

## 2023-01-30 NOTE — PROGRESS NOTE ADULT - SUBJECTIVE AND OBJECTIVE BOX
[facility] Division of Hospital Medicine  Len Washington DO  Pager (M-F, 5U-4O): [phone]      Patient is a 52y old  Female who presents with a chief complaint of 2019 Novel coronavirus disease (29 Jan 2023 13:03)      SUBJECTIVE / OVERNIGHT EVENTS: no overnight events, pt reports worsening congestion and sob with exertion   ADDITIONAL REVIEW OF SYSTEMS: no cp/sob     MEDICATIONS  (STANDING):  albuterol    90 MICROgram(s) HFA Inhaler 2 Puff(s) Inhalation every 6 hours  ascorbic acid 500 milliGRAM(s) Oral daily  atorvastatin 10 milliGRAM(s) Oral at bedtime  benzonatate 100 milliGRAM(s) Oral three times a day  budesonide 160 MICROgram(s)/formoterol 4.5 MICROgram(s) Inhaler 2 Puff(s) Inhalation two times a day  cholecalciferol 2000 Unit(s) Oral daily  dextrose 5%. 1000 milliLiter(s) (100 mL/Hr) IV Continuous <Continuous>  dextrose 5%. 1000 milliLiter(s) (50 mL/Hr) IV Continuous <Continuous>  dextrose 50% Injectable 25 Gram(s) IV Push once  dextrose 50% Injectable 12.5 Gram(s) IV Push once  dextrose 50% Injectable 25 Gram(s) IV Push once  enoxaparin Injectable 40 milliGRAM(s) SubCutaneous every 12 hours  escitalopram 10 milliGRAM(s) Oral daily  fluticasone propionate 50 MICROgram(s)/spray Nasal Spray      fluticasone propionate 50 MICROgram(s)/spray Nasal Spray 1 Spray(s) Both Nostrils two times a day  glucagon  Injectable 1 milliGRAM(s) IntraMuscular once  insulin glargine Injectable (LANTUS) 30 Unit(s) SubCutaneous at bedtime  insulin lispro (ADMELOG) corrective regimen sliding scale   SubCutaneous three times a day before meals  insulin lispro (ADMELOG) corrective regimen sliding scale   SubCutaneous at bedtime  insulin lispro Injectable (ADMELOG) 15 Unit(s) SubCutaneous three times a day before meals  levoFLOXacin  Tablet 750 milliGRAM(s) Oral every 24 hours  lisinopril 20 milliGRAM(s) Oral two times a day  multivitamin 1 Tablet(s) Oral daily  sodium chloride 0.9%. 1000 milliLiter(s) (100 mL/Hr) IV Continuous <Continuous>  zinc sulfate 220 milliGRAM(s) Oral daily    MEDICATIONS  (PRN):  acetaminophen     Tablet .. 650 milliGRAM(s) Oral every 6 hours PRN Temp greater or equal to 38C (100.4F), Mild Pain (1 - 3)  dextrose Oral Gel 15 Gram(s) Oral once PRN Blood Glucose LESS THAN 70 milliGRAM(s)/deciliter  guaiFENesin Oral Liquid (Sugar-Free) 100 milliGRAM(s) Oral every 6 hours PRN Cough      CAPILLARY BLOOD GLUCOSE      POCT Blood Glucose.: 206 mg/dL (30 Jan 2023 12:04)  POCT Blood Glucose.: 161 mg/dL (30 Jan 2023 08:10)  POCT Blood Glucose.: 149 mg/dL (29 Jan 2023 21:46)  POCT Blood Glucose.: 166 mg/dL (29 Jan 2023 18:20)    I&O's Summary      PHYSICAL EXAM:  Vital Signs Last 24 Hrs  T(C): 36.6 (30 Jan 2023 10:14), Max: 36.8 (29 Jan 2023 18:32)  T(F): 97.9 (30 Jan 2023 10:14), Max: 98.2 (29 Jan 2023 18:32)  HR: 69 (30 Jan 2023 10:14) (69 - 85)  BP: 122/53 (30 Jan 2023 10:14) (112/65 - 154/85)  BP(mean): --  RR: 18 (30 Jan 2023 10:14) (16 - 18)  SpO2: 99% (30 Jan 2023 10:14) (99% - 100%)    Parameters below as of 30 Jan 2023 10:14  Patient On (Oxygen Delivery Method): room air      CONSTITUTIONAL: NAD, well-developed, well-groomed  EYES: PERRLA; conjunctiva and sclera clear  ENMT: Moist oral mucosa, no pharyngeal injection or exudates; normal dentition  RESPIRATORY: Normal respiratory effort; no wheeze   CARDIOVASCULAR: Regular rate and rhythm, normal S1 and S2, no murmur/rub/gallop  ABDOMEN: Nontender to palpation, normoactive bowel sounds, soft   PSYCH: A+O to person, place, and time; affect appropriate  SKIN: No rashes; no palpable lesions, acanthosis nigricans    LABS:                        10.9   11.44 )-----------( 206      ( 29 Jan 2023 05:47 )             35.8     01-29    134<L>  |  99  |  33<H>  ----------------------------<  301<H>  4.1   |  26  |  0.73    Ca    9.2      29 Jan 2023 05:47  Phos  3.5     01-29  Mg     1.70     01-29    TPro  6.4  /  Alb  3.2<L>  /  TBili  <0.2  /  DBili  x   /  AST  14  /  ALT  17  /  AlkPhos  92  01-29                RADIOLOGY & ADDITIONAL TESTS:  Results Reviewed:   Imaging Personally Reviewed:  Electrocardiogram Personally Reviewed:    COORDINATION OF CARE:  Care Discussed with Consultants/Other Providers [Y/N]: Y  Prior or Outpatient Records Reviewed [Y/N]: TRACEY

## 2023-01-30 NOTE — PROGRESS NOTE ADULT - PROBLEM SELECTOR PROBLEM 2
Persistent shortness of breath after COVID-19

## 2023-01-30 NOTE — PROGRESS NOTE ADULT - PROBLEM SELECTOR PLAN 8
- Lovenox 40 mg SQ BID
- unclear location, but is sequela of [place]/9-11 exposure, per patient  - patient thinks last measurement was ~ 9mm (unsure)  - has not been able to follow-up  with scans Q3 to Q6 months for the past ~ one year  - would obtain outpatient records  - repeat CT (likely as outpatient)
- unclear location, but is sequela of [place]/9-11 exposure, per patient  - patient thinks last measurement was ~ 9mm (unsure)  - has not been able to follow-up  with scans Q3 to Q6 months for the past ~ one year  - would obtain outpatient records  - repeat CT (likely as outpatient)  - Pulmonology consult
- unclear location, but is sequela of [place]/9-11 exposure, per patient  - patient thinks last measurement was ~ 9mm (unsure)  - has not been able to follow-up  with scans Q3 to Q6 months for the past ~ one year  - would obtain outpatient records  - repeat CT (likely as outpatient)

## 2023-01-31 ENCOUNTER — TRANSCRIPTION ENCOUNTER (OUTPATIENT)
Age: 53
End: 2023-01-31

## 2023-01-31 VITALS
TEMPERATURE: 98 F | OXYGEN SATURATION: 99 % | DIASTOLIC BLOOD PRESSURE: 68 MMHG | RESPIRATION RATE: 18 BRPM | SYSTOLIC BLOOD PRESSURE: 112 MMHG | HEART RATE: 86 BPM

## 2023-01-31 LAB
GLUCOSE BLDC GLUCOMTR-MCNC: 202 MG/DL — HIGH (ref 70–99)
GLUCOSE BLDC GLUCOMTR-MCNC: 213 MG/DL — HIGH (ref 70–99)

## 2023-01-31 PROCEDURE — 99239 HOSP IP/OBS DSCHRG MGMT >30: CPT

## 2023-01-31 RX ORDER — ALBUTEROL 90 UG/1
2 AEROSOL, METERED ORAL
Qty: 240 | Refills: 0
Start: 2023-01-31 | End: 2023-03-01

## 2023-01-31 RX ORDER — ATORVASTATIN CALCIUM 80 MG/1
1 TABLET, FILM COATED ORAL
Qty: 0 | Refills: 0 | DISCHARGE

## 2023-01-31 RX ORDER — INSULIN DETEMIR 100/ML (3)
30 INSULIN PEN (ML) SUBCUTANEOUS
Qty: 5 | Refills: 0
Start: 2023-01-31 | End: 2023-03-01

## 2023-01-31 RX ORDER — LEVOFLOXACIN 5 MG/ML
1 INJECTION, SOLUTION INTRAVENOUS
Qty: 4 | Refills: 0
Start: 2023-01-31 | End: 2023-02-03

## 2023-01-31 RX ORDER — INSULIN LISPRO 100/ML
15 VIAL (ML) SUBCUTANEOUS
Qty: 1350 | Refills: 0
Start: 2023-01-31 | End: 2023-03-01

## 2023-01-31 RX ORDER — BUDESONIDE AND FORMOTEROL FUMARATE DIHYDRATE 160; 4.5 UG/1; UG/1
2 AEROSOL RESPIRATORY (INHALATION)
Qty: 120 | Refills: 0
Start: 2023-01-31 | End: 2023-03-01

## 2023-01-31 RX ORDER — ATORVASTATIN CALCIUM 80 MG/1
1 TABLET, FILM COATED ORAL
Qty: 30 | Refills: 0
Start: 2023-01-31 | End: 2023-03-01

## 2023-01-31 RX ADMIN — Medication 4: at 08:52

## 2023-01-31 RX ADMIN — ENOXAPARIN SODIUM 40 MILLIGRAM(S): 100 INJECTION SUBCUTANEOUS at 11:52

## 2023-01-31 RX ADMIN — ZINC SULFATE TAB 220 MG (50 MG ZINC EQUIVALENT) 220 MILLIGRAM(S): 220 (50 ZN) TAB at 11:52

## 2023-01-31 RX ADMIN — Medication 15 UNIT(S): at 08:52

## 2023-01-31 RX ADMIN — Medication 2000 UNIT(S): at 11:51

## 2023-01-31 RX ADMIN — Medication 100 MILLIGRAM(S): at 16:09

## 2023-01-31 RX ADMIN — Medication 1 TABLET(S): at 11:51

## 2023-01-31 RX ADMIN — LISINOPRIL 20 MILLIGRAM(S): 2.5 TABLET ORAL at 05:09

## 2023-01-31 RX ADMIN — Medication 500 MILLIGRAM(S): at 11:52

## 2023-01-31 RX ADMIN — Medication 4: at 12:38

## 2023-01-31 RX ADMIN — ALBUTEROL 2 PUFF(S): 90 AEROSOL, METERED ORAL at 10:43

## 2023-01-31 RX ADMIN — Medication 15 UNIT(S): at 12:39

## 2023-01-31 RX ADMIN — BUDESONIDE AND FORMOTEROL FUMARATE DIHYDRATE 2 PUFF(S): 160; 4.5 AEROSOL RESPIRATORY (INHALATION) at 08:53

## 2023-01-31 RX ADMIN — ESCITALOPRAM OXALATE 10 MILLIGRAM(S): 10 TABLET, FILM COATED ORAL at 11:50

## 2023-01-31 RX ADMIN — Medication 100 MILLIGRAM(S): at 05:09

## 2023-01-31 NOTE — DISCHARGE NOTE NURSING/CASE MANAGEMENT/SOCIAL WORK - NSDCPEFALRISK_GEN_ALL_CORE
For information on Fall & Injury Prevention, visit: [email or web address] OR  [email or web address] OR  https://www.cdc.gov/steadi/patient.html

## 2023-01-31 NOTE — DISCHARGE NOTE NURSING/CASE MANAGEMENT/SOCIAL WORK - PATIENT PORTAL LINK FT
You can access the FollowMyHealth Patient Portal offered by [facility] by registering at the following website: [email or web address]. By joining [employer]’s FollowMyHealth portal, you will also be able to view your health information using other applications (apps) compatible with our system.

## 2023-01-31 NOTE — CHART NOTE - NSCHARTNOTEFT_GEN_A_CORE
Pt DC ready, but reluctant to discharged, has been wanting to stay another day for last 2 days, b/c of overall feeling unwell. However discussed there she does not need a medical admission at this time, physical symptoms, such as fatigue, exhaustion, congestion from COVID can take weeks to months to improve. Ambulatory saturation 98%.   Advised pt to see her PCP or pulmonologist in 1-2 weeks upon discharge for evaluation and repeat COVID PCR if desired, prior to seeing new grandchild.   Refills provided on all medications per pt request.   See DC note.

## 2023-01-31 NOTE — DISCHARGE NOTE NURSING/CASE MANAGEMENT/SOCIAL WORK - NSDCFUADDAPPT_GEN_ALL_CORE_FT
Follow up with your pulmonologist outpatient     follow up with podiatry and optho outpatient re your diabetes

## 2023-02-07 PROBLEM — R00.2 PALPITATIONS: Chronic | Status: ACTIVE | Noted: 2023-01-27

## 2023-02-07 PROBLEM — I10 ESSENTIAL (PRIMARY) HYPERTENSION: Chronic | Status: ACTIVE | Noted: 2023-01-26

## 2023-02-07 PROBLEM — R91.1 SOLITARY PULMONARY NODULE: Chronic | Status: ACTIVE | Noted: 2023-01-27

## 2023-02-07 PROBLEM — E03.9 HYPOTHYROIDISM, UNSPECIFIED: Chronic | Status: ACTIVE | Noted: 2023-01-26

## 2023-02-07 PROBLEM — E83.42 HYPOMAGNESEMIA: Chronic | Status: ACTIVE | Noted: 2023-01-27

## 2023-04-17 ENCOUNTER — APPOINTMENT (OUTPATIENT)
Dept: CARDIOLOGY | Facility: CLINIC | Age: 53
End: 2023-04-17
Payer: COMMERCIAL

## 2023-04-17 ENCOUNTER — NON-APPOINTMENT (OUTPATIENT)
Age: 53
End: 2023-04-17

## 2023-04-17 VITALS
HEIGHT: 65 IN | DIASTOLIC BLOOD PRESSURE: 80 MMHG | BODY MASS INDEX: 43.99 KG/M2 | SYSTOLIC BLOOD PRESSURE: 118 MMHG | OXYGEN SATURATION: 98 % | WEIGHT: 264 LBS | HEART RATE: 94 BPM

## 2023-04-17 DIAGNOSIS — R07.9 CHEST PAIN, UNSPECIFIED: ICD-10-CM

## 2023-04-17 DIAGNOSIS — R09.81 NASAL CONGESTION: ICD-10-CM

## 2023-04-17 PROCEDURE — 93000 ELECTROCARDIOGRAM COMPLETE: CPT

## 2023-04-17 PROCEDURE — 99214 OFFICE O/P EST MOD 30 MIN: CPT | Mod: 25

## 2023-04-17 RX ORDER — SAXAGLIPTIN AND METFORMIN HYDROCHLORIDE 2.5; 1 MG/1; MG/1
2.5-1 TABLET, FILM COATED, EXTENDED RELEASE ORAL
Refills: 0 | Status: DISCONTINUED | COMMUNITY
End: 2023-04-17

## 2023-04-17 RX ORDER — GLIMEPIRIDE 2 MG/1
2 TABLET ORAL DAILY
Refills: 0 | Status: DISCONTINUED | COMMUNITY
End: 2023-04-17

## 2023-04-17 RX ORDER — NAPROXEN 500 MG/1
500 TABLET ORAL
Qty: 60 | Refills: 0 | Status: DISCONTINUED | COMMUNITY
Start: 2021-10-05 | End: 2023-04-17

## 2023-04-17 RX ORDER — LEVOFLOXACIN 750 MG/1
750 TABLET, FILM COATED ORAL
Qty: 4 | Refills: 0 | Status: DISCONTINUED | COMMUNITY
Start: 2023-01-31 | End: 2023-04-17

## 2023-04-17 RX ORDER — AMLODIPINE BESYLATE 5 MG/1
5 TABLET ORAL
Refills: 0 | Status: DISCONTINUED | COMMUNITY
End: 2023-04-17

## 2023-04-17 RX ORDER — ASPIRIN 81 MG/1
81 TABLET, CHEWABLE ORAL DAILY
Refills: 0 | Status: DISCONTINUED | COMMUNITY
End: 2023-04-17

## 2023-04-17 RX ORDER — AMLODIPINE BESYLATE 10 MG/1
10 TABLET ORAL DAILY
Refills: 0 | Status: DISCONTINUED | COMMUNITY
End: 2023-04-17

## 2023-04-17 RX ORDER — TRAMADOL HYDROCHLORIDE 50 MG/1
50 TABLET, COATED ORAL
Qty: 14 | Refills: 0 | Status: DISCONTINUED | COMMUNITY
Start: 2021-08-27 | End: 2023-04-17

## 2023-04-17 RX ORDER — GABAPENTIN 600 MG/1
600 TABLET, COATED ORAL 3 TIMES DAILY
Qty: 90 | Refills: 1 | Status: DISCONTINUED | COMMUNITY
Start: 2018-08-16 | End: 2023-04-17

## 2023-04-17 RX ORDER — AZITHROMYCIN 250 MG/1
250 TABLET, FILM COATED ORAL
Qty: 6 | Refills: 0 | Status: DISCONTINUED | COMMUNITY
Start: 2021-10-20 | End: 2023-04-17

## 2023-04-17 RX ORDER — ATORVASTATIN CALCIUM 40 MG/1
40 TABLET, FILM COATED ORAL DAILY
Refills: 0 | Status: DISCONTINUED | COMMUNITY
End: 2023-04-17

## 2023-04-17 RX ORDER — KETOCONAZOLE 20 MG/G
2 CREAM TOPICAL
Qty: 60 | Refills: 0 | Status: ACTIVE | COMMUNITY
Start: 2023-04-11

## 2023-04-17 RX ORDER — FLUTICASONE PROPIONATE 50 UG/1
50 SPRAY, METERED NASAL TWICE DAILY
Qty: 3 | Refills: 3 | Status: ACTIVE | COMMUNITY
Start: 2023-04-17 | End: 1900-01-01

## 2023-04-17 RX ORDER — METHYLPREDNISOLONE 4 MG/1
4 TABLET ORAL
Qty: 21 | Refills: 0 | Status: ACTIVE | COMMUNITY
Start: 2023-03-16

## 2023-04-17 NOTE — HISTORY OF PRESENT ILLNESS
[FreeTextEntry1] : 49yo lady with a PMH of HTN, HL, DM, reactive airway disease, LBP secondary to multiple herniated discs and trigeminal neuralgia who presents the ED for chest pain.  Patient reports for the past three days she has had stinging chest pain substernally and underneath her L breast.  Patient reports that episodes last less than 30 seconds and denies associated symptoms like palpitations, dyspnea, lightheadedness dizziness, and syncope.  Patient also reports that during one of the episodes she took 3 aspirin which significantly improved her symptoms.  Patient states that she is non-compliant with her medications for HTN and DM, took herself off all her meds ~1yr ago.  Had labs done three days ago and was told her A1c was 12.8.  Patient has no other complaints.  \par Angiogram a few years ago with non-obstructive CAD as per pt (at [facility]).\par Very strong FH of CAD in her family, mother s/p MIs; brother with BKA and sister blind from uncontrolled DM\par Trop neg x3\par \par EKG Repeated, unchanged from before \par 2D echo with normal EF, no significant valvular lesions noted, mild pHTN\par stress test normal \par Offered cardiac cath as pt is still with c/o occasional chest discomfort, pt prefers not to have cardiac cath, instead pt is requesting to be seen by GI for an endoscopy and pulm eval\par \par 2/15/22:\par Here for followup after being lost 2/2 COVID\par Presenting with episodes of chest discomfort that are different from prior.\par EKG sinus 74bpm\par \par 4/17/23:\par still c/o intermittent SOB and chest discomfort

## 2023-06-01 NOTE — PATIENT PROFILE ADULT - NSPROGENBLOODRESTRICT_GEN_A_NUR
HPI:   Donnell Lou is a 74 y.o. female is here today for    Chief Complaint: Lumbar back pain, Thoracic back pain , cervical pain , Hip pain, SI pain, and Fibromyalgia      F/U no new changes. She gardens and crafts which has caused increased pain. Increased pain due to  yard work. She has had L5-S1 lumbar surgery in 2018 with Dr Milagros Meadows, not much relief. She had surgical re eval and he recommended pain injections. She has had TFLESI L5 right  with minimal releif. We have discussed Lumbar Facet MBB L4-5,5-S1 bilateral or SI MBB she declines at this time due to  just having heart stents and medical bills. She continues to take Tramadol  very rarely and Mobic. She has had PT with dry needling ad cupping in past with some relief. She has been unable to tolerate Neurontin Elavil Cymbalta  Pain increases with bending, lifting, twisting , reaching, pushing, walking, standing, stairs and getting up and down. Treatments Tried ice, heat, NSAIDS, narcotics, muscle relaxer, OTC rubs creams patches, steroid burst and injections  Pain Description sharp, shooting, stabbing, burning, aching, numbness, tingling and pins and needles        Pain increases with bending, lifting, twisting , reaching, pushing, pulling, walking, standing, stairs and getting up and down. Treatments Tried PT/HEP, ice, heat, NSAIDS, narcotics, muscle relaxer, OTC rubs creams patches, dry needling cupping acupuncture , steroid burst and injections  Pain Description sharp, shooting, stabbing, burning and aching     She denies any ER visits or new health issues since last visit. Pain scale with out pain medications or at its worst is 8/10. Had to sit on bleachers for 2 hour basketball tournament last night. Pain scale with pain medications or at its best is 5/10.   Last dose of Tramadol  was yesterday  Drug screen reviewed from  3/2023 and was appropriate  Pill count completed  today and WNL: Yes         Prior Injections:  SHe has none

## 2023-06-05 ENCOUNTER — APPOINTMENT (OUTPATIENT)
Dept: CT IMAGING | Facility: CLINIC | Age: 53
End: 2023-06-05
Payer: COMMERCIAL

## 2023-06-05 ENCOUNTER — OUTPATIENT (OUTPATIENT)
Dept: OUTPATIENT SERVICES | Facility: HOSPITAL | Age: 53
LOS: 1 days | End: 2023-06-05
Payer: COMMERCIAL

## 2023-06-05 DIAGNOSIS — R00.2 PALPITATIONS: ICD-10-CM

## 2023-06-05 DIAGNOSIS — R07.9 CHEST PAIN, UNSPECIFIED: ICD-10-CM

## 2023-06-05 PROCEDURE — 75574 CT ANGIO HRT W/3D IMAGE: CPT | Mod: 26

## 2023-06-05 PROCEDURE — 75574 CT ANGIO HRT W/3D IMAGE: CPT

## 2023-08-17 ENCOUNTER — RX RENEWAL (OUTPATIENT)
Age: 53
End: 2023-08-17

## 2023-10-23 ENCOUNTER — APPOINTMENT (OUTPATIENT)
Dept: CARDIOLOGY | Facility: CLINIC | Age: 53
End: 2023-10-23
Payer: COMMERCIAL

## 2023-10-23 ENCOUNTER — NON-APPOINTMENT (OUTPATIENT)
Age: 53
End: 2023-10-23

## 2023-10-23 VITALS
SYSTOLIC BLOOD PRESSURE: 130 MMHG | DIASTOLIC BLOOD PRESSURE: 82 MMHG | WEIGHT: 268 LBS | HEIGHT: 65 IN | BODY MASS INDEX: 44.65 KG/M2 | HEART RATE: 90 BPM | OXYGEN SATURATION: 98 %

## 2023-10-23 DIAGNOSIS — E78.2 MIXED HYPERLIPIDEMIA: ICD-10-CM

## 2023-10-23 PROCEDURE — 99214 OFFICE O/P EST MOD 30 MIN: CPT | Mod: 25

## 2023-10-23 PROCEDURE — 93000 ELECTROCARDIOGRAM COMPLETE: CPT

## 2023-10-23 RX ORDER — CHOLESTYRAMINE
POWDER (GRAM) MISCELLANEOUS
Refills: 0 | Status: ACTIVE | COMMUNITY

## 2023-10-23 RX ORDER — BENZONATATE 100 MG/1
100 CAPSULE ORAL
Qty: 30 | Refills: 0 | Status: DISCONTINUED | COMMUNITY
Start: 2023-01-31 | End: 2023-10-23

## 2023-10-23 RX ORDER — ESCITALOPRAM OXALATE 10 MG/1
10 TABLET ORAL DAILY
Refills: 0 | Status: ACTIVE | COMMUNITY

## 2023-10-23 RX ORDER — BENZONATATE 200 MG/1
200 CAPSULE ORAL
Qty: 21 | Refills: 0 | Status: DISCONTINUED | COMMUNITY
Start: 2023-01-26 | End: 2023-10-23

## 2023-10-23 RX ORDER — AZITHROMYCIN 250 MG/1
250 TABLET, FILM COATED ORAL
Qty: 1 | Refills: 1 | Status: DISCONTINUED | COMMUNITY
Start: 2023-04-17 | End: 2023-10-23

## 2024-01-05 NOTE — ED ADULT NURSE NOTE - NS ED NURSE LEVEL OF CONSCIOUSNESS ORIENTATION
Pt with recent Hx of perforated diverticulum to ED with c/o abdominal pain and nausea.  Pt currently on Abx.     Oriented - self; Oriented - place; Oriented - time

## 2024-02-15 ENCOUNTER — OUTPATIENT (OUTPATIENT)
Dept: OUTPATIENT SERVICES | Facility: HOSPITAL | Age: 54
LOS: 1 days | Discharge: HOME | End: 2024-02-15

## 2024-02-15 DIAGNOSIS — L89.90 PRESSURE ULCER OF UNSPECIFIED SITE, UNSPECIFIED STAGE: ICD-10-CM

## 2024-02-16 DIAGNOSIS — Z79.4 LONG TERM (CURRENT) USE OF INSULIN: ICD-10-CM
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
## 2024-02-16 DIAGNOSIS — Z72.4 INAPPROPRIATE DIET AND EATING HABITS: ICD-10-CM

## 2024-02-16 DIAGNOSIS — I25.10 ATHEROSCLEROTIC HEART DISEASE OF NATIVE CORONARY ARTERY WITHOUT ANGINA PECTORIS: ICD-10-CM

## 2024-02-16 DIAGNOSIS — S81.821A LACERATION WITH FOREIGN BODY, RIGHT LOWER LEG, INITIAL ENCOUNTER: ICD-10-CM
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
## 2024-02-16 DIAGNOSIS — I10 ESSENTIAL (PRIMARY) HYPERTENSION: ICD-10-CM

## 2024-02-16 DIAGNOSIS — M10.9 GOUT, UNSPECIFIED: ICD-10-CM

## 2024-02-16 DIAGNOSIS — E11.40 TYPE 2 DIABETES MELLITUS WITH DIABETIC NEUROPATHY, UNSPECIFIED: ICD-10-CM

## 2024-02-16 DIAGNOSIS — L03.115 CELLULITIS OF RIGHT LOWER LIMB: ICD-10-CM

## 2024-02-22 ENCOUNTER — OUTPATIENT (OUTPATIENT)
Dept: OUTPATIENT SERVICES | Facility: HOSPITAL | Age: 54
LOS: 1 days | Discharge: HOME | End: 2024-02-22
Payer: COMMERCIAL

## 2024-02-22 DIAGNOSIS — L89.90 PRESSURE ULCER OF UNSPECIFIED SITE, UNSPECIFIED STAGE: ICD-10-CM

## 2024-02-22 PROCEDURE — 99212 OFFICE O/P EST SF 10 MIN: CPT

## 2024-02-23 DIAGNOSIS — Z72.4 INAPPROPRIATE DIET AND EATING HABITS: ICD-10-CM

## 2024-02-23 DIAGNOSIS — I25.10 ATHEROSCLEROTIC HEART DISEASE OF NATIVE CORONARY ARTERY WITHOUT ANGINA PECTORIS: ICD-10-CM

## 2024-02-23 DIAGNOSIS — L03.115 CELLULITIS OF RIGHT LOWER LIMB: ICD-10-CM

## 2024-02-23 DIAGNOSIS — I10 ESSENTIAL (PRIMARY) HYPERTENSION: ICD-10-CM

## 2024-02-23 DIAGNOSIS — Z79.4 LONG TERM (CURRENT) USE OF INSULIN: ICD-10-CM

## 2024-02-23 DIAGNOSIS — M10.9 GOUT, UNSPECIFIED: ICD-10-CM

## 2024-02-23 DIAGNOSIS — S81.821A LACERATION WITH FOREIGN BODY, RIGHT LOWER LEG, INITIAL ENCOUNTER: ICD-10-CM
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
## 2024-02-23 DIAGNOSIS — E11.40 TYPE 2 DIABETES MELLITUS WITH DIABETIC NEUROPATHY, UNSPECIFIED: ICD-10-CM
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
## 2024-02-29 ENCOUNTER — OUTPATIENT (OUTPATIENT)
Dept: OUTPATIENT SERVICES | Facility: HOSPITAL | Age: 54
LOS: 1 days | Discharge: HOME | End: 2024-02-29
Payer: COMMERCIAL

## 2024-02-29 DIAGNOSIS — L89.90 PRESSURE ULCER OF UNSPECIFIED SITE, UNSPECIFIED STAGE: ICD-10-CM

## 2024-02-29 PROCEDURE — 99213 OFFICE O/P EST LOW 20 MIN: CPT

## 2024-03-05 DIAGNOSIS — Z72.4 INAPPROPRIATE DIET AND EATING HABITS: ICD-10-CM

## 2024-03-05 DIAGNOSIS — Z79.4 LONG TERM (CURRENT) USE OF INSULIN: ICD-10-CM

## 2024-03-05 DIAGNOSIS — I10 ESSENTIAL (PRIMARY) HYPERTENSION: ICD-10-CM

## 2024-03-05 DIAGNOSIS — M10.9 GOUT, UNSPECIFIED: ICD-10-CM

## 2024-03-05 DIAGNOSIS — E11.40 TYPE 2 DIABETES MELLITUS WITH DIABETIC NEUROPATHY, UNSPECIFIED: ICD-10-CM

## 2024-03-05 DIAGNOSIS — I25.10 ATHEROSCLEROTIC HEART DISEASE OF NATIVE CORONARY ARTERY WITHOUT ANGINA PECTORIS: ICD-10-CM

## 2024-04-02 ENCOUNTER — APPOINTMENT (OUTPATIENT)
Dept: CARDIOLOGY | Facility: CLINIC | Age: 54
End: 2024-04-02
Payer: COMMERCIAL

## 2024-04-02 ENCOUNTER — NON-APPOINTMENT (OUTPATIENT)
Age: 54
End: 2024-04-02

## 2024-04-02 VITALS
WEIGHT: 291 LBS | HEART RATE: 87 BPM | DIASTOLIC BLOOD PRESSURE: 74 MMHG | OXYGEN SATURATION: 98 % | HEIGHT: 65 IN | SYSTOLIC BLOOD PRESSURE: 140 MMHG | BODY MASS INDEX: 48.48 KG/M2

## 2024-04-02 DIAGNOSIS — E11.9 TYPE 2 DIABETES MELLITUS W/OUT COMPLICATIONS: ICD-10-CM

## 2024-04-02 PROCEDURE — 93000 ELECTROCARDIOGRAM COMPLETE: CPT

## 2024-04-02 PROCEDURE — 99214 OFFICE O/P EST MOD 30 MIN: CPT | Mod: 25

## 2024-04-02 PROCEDURE — G2211 COMPLEX E/M VISIT ADD ON: CPT | Mod: NC,1L

## 2024-04-02 RX ORDER — INSULIN GLARGINE 100 [IU]/ML
100 INJECTION, SOLUTION SUBCUTANEOUS
Refills: 0 | Status: ACTIVE | COMMUNITY

## 2024-04-02 RX ORDER — METFORMIN HYDROCHLORIDE 500 MG/1
500 TABLET, COATED ORAL TWICE DAILY
Refills: 0 | Status: DISCONTINUED | COMMUNITY
End: 2024-04-02

## 2024-04-02 RX ORDER — ATORVASTATIN CALCIUM 10 MG/1
10 TABLET, FILM COATED ORAL
Qty: 90 | Refills: 0 | Status: ACTIVE | COMMUNITY
Start: 2021-05-11 | End: 1900-01-01

## 2024-04-02 NOTE — PHYSICAL EXAM
[Normal Appearance] : normal appearance [General Appearance - Well Developed] : well developed [Well Groomed] : well groomed [No Deformities] : no deformities [General Appearance - Well Nourished] : well nourished [General Appearance - In No Acute Distress] : no acute distress [Normal Conjunctiva] : the conjunctiva exhibited no abnormalities [Eyelids - No Xanthelasma] : the eyelids demonstrated no xanthelasmas [Normal Oral Mucosa] : normal oral mucosa [No Oral Pallor] : no oral pallor [No Oral Cyanosis] : no oral cyanosis [Normal Jugular Venous A Waves Present] : normal jugular venous A waves present [Normal Jugular Venous V Waves Present] : normal jugular venous V waves present [No Jugular Venous Jean A Waves] : no jugular venous jean A waves [Normal Rate] : normal [Normal S1] : normal S1 [Normal S2] : normal S2 [No Murmur] : no murmurs heard [2+] : left 2+ [No Pitting Edema] : no pitting edema present [No Abnormalities] : the abdominal aorta was not enlarged and no bruit was heard [Normal Rhythm/Effort] : normal respiratory rhythm and effort [Clear Bilaterally] : the lungs were clear to auscultation bilaterally [Normal to Percussion] : the lungs were normal to percussion [Normal] : palpation of the chest was normal [Abdomen Soft] : soft [Abdomen Tenderness] : non-tender [Abdomen Mass (___ Cm)] : no abdominal mass palpated [Abnormal Walk] : normal gait [Gait - Sufficient For Exercise Testing] : the gait was sufficient for exercise testing [Nail Clubbing] : no clubbing of the fingernails [Cyanosis, Localized] : no localized cyanosis [Petechial Hemorrhages (___cm)] : no petechial hemorrhages [] : no rash [Skin Color & Pigmentation] : normal skin color and pigmentation [No Venous Stasis] : no venous stasis [Skin Lesions] : no skin lesions [No Skin Ulcers] : no skin ulcer [No Xanthoma] : no  xanthoma was observed [Oriented To Time, Place, And Person] : oriented to person, place, and time [Affect] : the affect was normal [Mood] : the mood was normal [No Anxiety] : not feeling anxious [S3] : no S3 [S4] : no S4 [Right Carotid Bruit] : no bruit heard over the right carotid [Left Carotid Bruit] : no bruit heard over the left carotid [Right Femoral Bruit] : no bruit heard over the right femoral artery [Left Femoral Bruit] : no bruit heard over the left femoral artery

## 2024-04-02 NOTE — HISTORY OF PRESENT ILLNESS
[FreeTextEntry1] : 52yo lady with a PMH of HTN, HL, DM, reactive airway disease, LBP secondary to multiple herniated discs and trigeminal neuralgia who presents the ED for chest pain.  Patient reports for the past three days she has had stinging chest pain substernally and underneath her L breast.  Patient reports that episodes last less than 30 seconds and denies associated symptoms like palpitations, dyspnea, lightheadedness dizziness, and syncope.  Patient also reports that during one of the episodes she took 3 aspirin which significantly improved her symptoms.  Patient states that she is non-compliant with her medications for HTN and DM, took herself off all her meds ~1yr ago.  Had labs done three days ago and was told her A1c was 12.8.  Patient has no other complaints.   Angiogram a few years ago with non-obstructive CAD as per pt (at [facility]). Very strong FH of CAD in her family, mother s/p MIs; brother with BKA and sister blind from uncontrolled DM Trop neg x3  EKG Repeated, unchanged from before  2D echo with normal EF, no significant valvular lesions noted, mild pHTN stress test normal  Offered cardiac cath as pt is still with c/o occasional chest discomfort, pt prefers not to have cardiac cath, instead pt is requesting to be seen by GI for an endoscopy and pulm chely  2/15/22: Here for followup after being lost 2/2 COVID Presenting with episodes of chest discomfort that are different from prior. EKG sinus 74bpm  4/17/23: still c/o intermittent SOB and chest discomfort  10/23/23: CTA with mild p/m LAD and RCA dz... otherwise well Awaiting EGD and colonoscopy for abd discomfort/diarrhea  04/02/2024 Visit for hospital follow up, she was treated for hypertensive urgency at [facility]. Was given IV labetalol with good effect. Presents fortitration of medications today.

## 2024-04-02 NOTE — REASON FOR VISIT
[Hyperlipidemia] : hyperlipidemia [CV Risk Factors and Non-Cardiac Disease] : CV risk factors and non-cardiac disease [Hypertension] : hypertension

## 2024-04-02 NOTE — CARDIOLOGY SUMMARY
[de-identified] : 04/02/2024: Sinus Rhythm  -Poor R-wave progression -nonspecific -consider old anterior infarct.

## 2024-04-12 ENCOUNTER — LABORATORY RESULT (OUTPATIENT)
Age: 54
End: 2024-04-12

## 2024-04-12 ENCOUNTER — APPOINTMENT (OUTPATIENT)
Dept: CARDIOLOGY | Facility: CLINIC | Age: 54
End: 2024-04-12
Payer: COMMERCIAL

## 2024-04-12 VITALS
SYSTOLIC BLOOD PRESSURE: 150 MMHG | OXYGEN SATURATION: 97 % | WEIGHT: 288 LBS | DIASTOLIC BLOOD PRESSURE: 82 MMHG | HEIGHT: 65 IN | BODY MASS INDEX: 47.98 KG/M2 | HEART RATE: 98 BPM

## 2024-04-12 DIAGNOSIS — R06.00 DYSPNEA, UNSPECIFIED: ICD-10-CM

## 2024-04-12 DIAGNOSIS — I10 ESSENTIAL (PRIMARY) HYPERTENSION: ICD-10-CM

## 2024-04-12 DIAGNOSIS — E78.5 HYPERLIPIDEMIA, UNSPECIFIED: ICD-10-CM

## 2024-04-12 PROCEDURE — G2211 COMPLEX E/M VISIT ADD ON: CPT

## 2024-04-12 PROCEDURE — 99214 OFFICE O/P EST MOD 30 MIN: CPT

## 2024-04-12 RX ORDER — LABETALOL HYDROCHLORIDE 200 MG/1
200 TABLET, FILM COATED ORAL
Qty: 180 | Refills: 2 | Status: ACTIVE | COMMUNITY
Start: 2024-04-03 | End: 1900-01-01

## 2024-04-12 NOTE — PHYSICAL EXAM
[General Appearance - Well Developed] : well developed [Normal Appearance] : normal appearance [Well Groomed] : well groomed [General Appearance - Well Nourished] : well nourished [No Deformities] : no deformities [General Appearance - In No Acute Distress] : no acute distress [Normal Conjunctiva] : the conjunctiva exhibited no abnormalities [Eyelids - No Xanthelasma] : the eyelids demonstrated no xanthelasmas [Normal Oral Mucosa] : normal oral mucosa [No Oral Pallor] : no oral pallor [No Oral Cyanosis] : no oral cyanosis [Normal Jugular Venous A Waves Present] : normal jugular venous A waves present [Normal Jugular Venous V Waves Present] : normal jugular venous V waves present [No Jugular Venous Jean A Waves] : no jugular venous jean A waves [Normal Rate] : normal [Normal S1] : normal S1 [Normal S2] : normal S2 [S3] : no S3 [S4] : no S4 [No Murmur] : no murmurs heard [Right Carotid Bruit] : no bruit heard over the right carotid [Left Carotid Bruit] : no bruit heard over the left carotid [Right Femoral Bruit] : no bruit heard over the right femoral artery [Left Femoral Bruit] : no bruit heard over the left femoral artery [2+] : left 2+ [No Abnormalities] : the abdominal aorta was not enlarged and no bruit was heard [No Pitting Edema] : no pitting edema present [Normal Rhythm/Effort] : normal respiratory rhythm and effort [Clear Bilaterally] : the lungs were clear to auscultation bilaterally [Normal to Percussion] : the lungs were normal to percussion [Normal] : palpation of the chest was normal [Abdomen Soft] : soft [Abdomen Tenderness] : non-tender [Abdomen Mass (___ Cm)] : no abdominal mass palpated [Abnormal Walk] : normal gait [Gait - Sufficient For Exercise Testing] : the gait was sufficient for exercise testing [Nail Clubbing] : no clubbing of the fingernails [Cyanosis, Localized] : no localized cyanosis [Petechial Hemorrhages (___cm)] : no petechial hemorrhages [Skin Color & Pigmentation] : normal skin color and pigmentation [] : no rash [No Venous Stasis] : no venous stasis [Skin Lesions] : no skin lesions [No Skin Ulcers] : no skin ulcer [No Xanthoma] : no  xanthoma was observed [Oriented To Time, Place, And Person] : oriented to person, place, and time [Affect] : the affect was normal [Mood] : the mood was normal [No Anxiety] : not feeling anxious

## 2024-04-12 NOTE — REVIEW OF SYSTEMS
[SOB] : no shortness of breath [Dyspnea on exertion] : dyspnea during exertion [Chest Discomfort] : no chest discomfort [Lower Ext Edema] : no extremity edema [Leg Claudication] : no intermittent leg claudication [Palpitations] : no palpitations [Orthopnea] : no orthopnea [PND] : no PND [Syncope] : no syncope [Negative] : Heme/Lymph

## 2024-04-12 NOTE — HISTORY OF PRESENT ILLNESS
[FreeTextEntry1] : 52yo lady with a PMH of HTN, HL, DM, reactive airway disease, LBP secondary to multiple herniated discs and trigeminal neuralgia who presents the ED for chest pain.  Patient reports for the past three days she has had stinging chest pain substernally and underneath her L breast.  Patient reports that episodes last less than 30 seconds and denies associated symptoms like palpitations, dyspnea, lightheadedness dizziness, and syncope.  Patient also reports that during one of the episodes she took 3 aspirin which significantly improved her symptoms.  Patient states that she is non-compliant with her medications for HTN and DM, took herself off all her meds ~1yr ago.  Had labs done three days ago and was told her A1c was 12.8.  Patient has no other complaints.   Angiogram a few years ago with non-obstructive CAD as per pt (at [facility]). Very strong FH of CAD in her family, mother s/p MIs; brother with BKA and sister blind from uncontrolled DM Trop neg x3  EKG Repeated, unchanged from before  2D echo with normal EF, no significant valvular lesions noted, mild pHTN stress test normal  Offered cardiac cath as pt is still with c/o occasional chest discomfort, pt prefers not to have cardiac cath, instead pt is requesting to be seen by GI for an endoscopy and pulm chely  2/15/22: Here for followup after being lost 2/2 COVID Presenting with episodes of chest discomfort that are different from prior. EKG sinus 74bpm  4/17/23: still c/o intermittent SOB and chest discomfort  10/23/23: CTA with mild p/m LAD and RCA dz... otherwise well Awaiting EGD and colonoscopy for abd discomfort/diarrhea  04/02/2024 Visit for hospital follow up, she was treated for hypertensive urgency at [facility]. Was given IV labetalol with good effect. Presents fortitration of medications today.  4/12/2024 54 yo f coming in today for a c/c of HTN Pt states today she noticed elevated BP reading at home, 200/105 prior to medication administration, took her labetalol and lisinopril, BP improved to 150/80's, in office reading documented in vitals. Pt reports she has been experiencing RAMON Is retired and takes care of granddaughter Pt reports improvement in A1C No chest pain, syncope, back pain, left jaw pain, arm pain, + ramon.

## 2024-04-12 NOTE — CARDIOLOGY SUMMARY
[de-identified] : 04/02/2024: Sinus Rhythm  -Poor R-wave progression -nonspecific -consider old anterior infarct.

## 2024-04-12 NOTE — DISCUSSION/SUMMARY
[___ Week(s)] : in [unfilled] week(s) [FreeTextEntry1] : 1. HTN: PT's bp is above goal, added chlorthalidone 25mg to current BP medication regimen.  1a. TTE to evaluate valvular function and muscle function as well as PAH  1b. Pharm nuc to r/o ischemia  1c. Duplex of the kidney to r/o artery stenosis  Labs today

## 2024-04-16 LAB
ALBUMIN SERPL ELPH-MCNC: 3.8 G/DL
ALP BLD-CCNC: 108 U/L
ALT SERPL-CCNC: 24 U/L
ANION GAP SERPL CALC-SCNC: 12 MMOL/L
AST SERPL-CCNC: 16 U/L
BILIRUB SERPL-MCNC: 0.3 MG/DL
BUN SERPL-MCNC: 16 MG/DL
CALCIUM SERPL-MCNC: 9.5 MG/DL
CHLORIDE SERPL-SCNC: 102 MMOL/L
CHOLEST SERPL-MCNC: 169 MG/DL
CK SERPL-CCNC: 84 U/L
CO2 SERPL-SCNC: 25 MMOL/L
CREAT SERPL-MCNC: 0.86 MG/DL
EGFR: 80 ML/MIN/1.73M2
ESTIMATED AVERAGE GLUCOSE: 174 MG/DL
GLUCOSE SERPL-MCNC: 187 MG/DL
HBA1C MFR BLD HPLC: 7.7 %
HCT VFR BLD CALC: 36.6 %
HDLC SERPL-MCNC: 43 MG/DL
HGB BLD-MCNC: 11.3 G/DL
IRON SATN MFR SERPL: 13 %
IRON SERPL-MCNC: 42 UG/DL
LDLC SERPL CALC-MCNC: 91 MG/DL
MCHC RBC-ENTMCNC: 27.1 PG
MCHC RBC-ENTMCNC: 30.9 GM/DL
MCV RBC AUTO: 87.8 FL
NONHDLC SERPL-MCNC: 126 MG/DL
PLATELET # BLD AUTO: 259 K/UL
POTASSIUM SERPL-SCNC: 4.2 MMOL/L
PROT SERPL-MCNC: 7 G/DL
RBC # BLD: 4.17 M/UL
RBC # FLD: 14.3 %
SODIUM SERPL-SCNC: 140 MMOL/L
T3RU NFR SERPL: 1 TBI
T4 SERPL-MCNC: 7.1 UG/DL
TIBC SERPL-MCNC: 316 UG/DL
TRIGL SERPL-MCNC: 201 MG/DL
TSH SERPL-ACNC: 1.98 UIU/ML
UIBC SERPL-MCNC: 274 UG/DL
WBC # FLD AUTO: 10.19 K/UL

## 2024-05-01 NOTE — ED ADULT NURSE NOTE - PLAN OF CARE
Explanation of exam/test/Call bell/Bedside visitors related to excessive energy intake relative to expenditure related to wound healing

## 2024-05-02 ENCOUNTER — APPOINTMENT (OUTPATIENT)
Dept: CARDIOLOGY | Facility: CLINIC | Age: 54
End: 2024-05-02
Payer: COMMERCIAL

## 2024-05-02 PROCEDURE — 78452 HT MUSCLE IMAGE SPECT MULT: CPT

## 2024-05-02 PROCEDURE — A9500: CPT

## 2024-05-02 PROCEDURE — 93015 CV STRESS TEST SUPVJ I&R: CPT

## 2024-05-06 ENCOUNTER — APPOINTMENT (OUTPATIENT)
Dept: CARDIOLOGY | Facility: CLINIC | Age: 54
End: 2024-05-06

## 2024-05-13 ENCOUNTER — APPOINTMENT (OUTPATIENT)
Dept: CARDIOLOGY | Facility: CLINIC | Age: 54
End: 2024-05-13
Payer: COMMERCIAL

## 2024-05-13 PROCEDURE — 93306 TTE W/DOPPLER COMPLETE: CPT

## 2024-05-16 ENCOUNTER — APPOINTMENT (OUTPATIENT)
Dept: ULTRASOUND IMAGING | Facility: HOSPITAL | Age: 54
End: 2024-05-16

## 2024-05-24 ENCOUNTER — APPOINTMENT (OUTPATIENT)
Dept: CARDIOLOGY | Facility: CLINIC | Age: 54
End: 2024-05-24

## 2024-05-30 RX ORDER — CHLORTHALIDONE 25 MG/1
25 TABLET ORAL
Qty: 30 | Refills: 0 | Status: ACTIVE | COMMUNITY
Start: 2024-04-12 | End: 1900-01-01

## 2024-05-30 RX ORDER — LISINOPRIL 40 MG/1
40 TABLET ORAL TWICE DAILY
Qty: 60 | Refills: 0 | Status: ACTIVE | COMMUNITY
Start: 1900-01-01 | End: 1900-01-01

## 2024-06-03 ENCOUNTER — APPOINTMENT (OUTPATIENT)
Dept: ULTRASOUND IMAGING | Facility: HOSPITAL | Age: 54
End: 2024-06-03

## 2024-06-12 ENCOUNTER — APPOINTMENT (OUTPATIENT)
Dept: ULTRASOUND IMAGING | Facility: HOSPITAL | Age: 54
End: 2024-06-12

## 2024-07-11 ENCOUNTER — APPOINTMENT (OUTPATIENT)
Dept: ULTRASOUND IMAGING | Facility: HOSPITAL | Age: 54
End: 2024-07-11

## 2024-07-24 NOTE — ED ADULT NURSE NOTE - CHIEF COMPLAINT
Anesthesia Pre Eval Note    Anesthesia ROS/Med Hx        Anesthetic Complication History:    Patient does not have a history of anesthetic complications      Pulmonary Review:  Patient does not have a pulmonary history      Neuro/Psych Review:  Patient does not have a neuro/psych history         Cardiovascular Review:  Patient does not have a cardiovascular history       GI/HEPATIC/RENAL Review:  Patient does not have a GI/hepatic/renalhistory       End/Other Review:  Patient does not have an endo/other history    Additional Results:      No results found for: \"WBC\", \"RBC\", \"HGB\", \"HCT\", \"MCV\", \"MCH\", \"MCHC\", \"RDWCV\", \"SODIUM\", \"POTASSIUM\", \"CHLORIDE\", \"CO2\", \"GLUCOSE\", \"BUN\", \"CREATININE\", \"GFRESTIMATE\", \"EGFRNONAFR\", \"GFRA\", \"GFRNA\", \"CALCIUM\", \"HCG\", \"PLT\", \"PTT\", \"INR\"   Past Medical History:  No date: [place] teeth extracted  Past Surgical History:  2015: Chalazion excision; Left      Comment:  removed from left eye      Relevant Problems   No relevant active problems       Physical Exam     Airway   Mallampati: III  TM Distance: >3 FB  Neck ROM: Full  Neck: Able to place in sniff position  TMJ Mobility: Good    Cardiovascular    Cardio Rate: Normal    Head Assessment  Head assessment: Normocephalic and Atraumatic    General Assessment  General Assessment: Alert and oriented and No acute distress    Dental Exam    Patient has:  Denied broken/chipped/loose teeth    Pulmonary Exam    Patient Demonstrates:  Non-labored Breathing    Abdominal Exam  Abdominal exam normal      Anesthesia Plan:    ASA Status: 1  Anesthesia Type: General    Induction: Intravenous  Preferred Airway Type: ETT  Patient does not have a difficult airway or is not at risk of aspiration.   Maintenance: Inhalational  Patient does not have an implantable electronic device requiring post procedure programming.     Post-op Pain Management: Per Surgeon      Checklist  Reviewed: NPO Status, Allergies, Medications, Problem list and Past Med  History  Consent/Risks Discussed Statement:  The proposed anesthetic plan, including its risks and benefits, have been discussed with the Patient along with the risks and benefits of alternatives. Questions were encouraged and answered and the patient and/or representative understands and agrees to proceed.        I discussed with the patient (and/or patient's legal representative) the risks and benefits of the proposed anesthesia plan, General, which may include services performed by other anesthesia providers.    Alternative anesthesia plans, if available, were reviewed with the patient (and/or patient's legal representative). Discussion has been held with the patient (and/or patient's legal representative) regarding risks of anesthesia, which include Nausea, Vomiting, Dental Injury and sore throat and emergent situations that may require change in anesthesia plan.    The patient (and/or patient's legal representative) has indicated understanding, his/her questions have been answered, and he/she wishes to proceed with the planned anesthetic.    Blood Products: Not Anticipated     The patient is a 49y Female complaining of chest pain.

## 2024-08-05 ENCOUNTER — RX RENEWAL (OUTPATIENT)
Age: 54
End: 2024-08-05

## 2024-08-27 ENCOUNTER — APPOINTMENT (OUTPATIENT)
Dept: ULTRASOUND IMAGING | Facility: HOSPITAL | Age: 54
End: 2024-08-27

## 2024-12-11 ENCOUNTER — RX RENEWAL (OUTPATIENT)
Age: 54
End: 2024-12-11

## 2025-01-18 NOTE — ED ADULT TRIAGE NOTE - ESI TRIAGE ACUITY LEVEL, MLM
2 Not neutropenic.  1/17 - F/u BCX  1/17 - F/U CT C/A/P-   1/17- F/u Flu/COVID PCR   last hospitalization: osteomyelitis and E coli urosepsis (12/1/24 - 12/18/24)   [facility] (12/18/24): for osteomyelitis & E coli urosepsis,

## 2025-04-04 NOTE — PROGRESS NOTE ADULT - PROBLEM SELECTOR PLAN 4
Addended by: FRIDA MIRZA on: 3/14/2019 02:35 PM     Modules accepted: Orders    
Addended by: KING JESSICA BRITO on: 3/15/2019 10:40 AM     Modules accepted: Orders    
Nebs standing.  Ventolin, Symbicort
lungs clear   Ventolin, Symbicort
.

## 2025-05-02 ENCOUNTER — NON-APPOINTMENT (OUTPATIENT)
Age: 55
End: 2025-05-02

## 2025-05-02 ENCOUNTER — APPOINTMENT (OUTPATIENT)
Dept: CARDIOLOGY | Facility: CLINIC | Age: 55
End: 2025-05-02
Payer: COMMERCIAL

## 2025-05-02 VITALS
HEART RATE: 80 BPM | BODY MASS INDEX: 48.82 KG/M2 | HEIGHT: 65 IN | SYSTOLIC BLOOD PRESSURE: 146 MMHG | DIASTOLIC BLOOD PRESSURE: 80 MMHG | OXYGEN SATURATION: 96 % | WEIGHT: 293 LBS

## 2025-05-02 VITALS — SYSTOLIC BLOOD PRESSURE: 130 MMHG | DIASTOLIC BLOOD PRESSURE: 78 MMHG

## 2025-05-02 DIAGNOSIS — R07.9 CHEST PAIN, UNSPECIFIED: ICD-10-CM

## 2025-05-02 PROCEDURE — 99214 OFFICE O/P EST MOD 30 MIN: CPT

## 2025-05-02 PROCEDURE — G2211 COMPLEX E/M VISIT ADD ON: CPT | Mod: NC

## 2025-05-02 PROCEDURE — 93000 ELECTROCARDIOGRAM COMPLETE: CPT

## 2025-05-02 RX ORDER — VERAPAMIL HYDROCHLORIDE 240 MG/1
240 CAPSULE, DELAYED RELEASE ORAL DAILY
Refills: 0 | Status: ACTIVE | COMMUNITY

## 2025-05-02 RX ORDER — ATORVASTATIN CALCIUM 20 MG/1
20 TABLET, FILM COATED ORAL DAILY
Refills: 0 | Status: ACTIVE | COMMUNITY

## 2025-05-02 RX ORDER — ESCITALOPRAM OXALATE 10 MG/1
10 TABLET, FILM COATED ORAL DAILY
Refills: 0 | Status: ACTIVE | COMMUNITY

## 2025-05-02 RX ORDER — IRBESARTAN AND HYDROCHLOROTHIAZIDE 300; 12.5 MG/1; MG/1
300-12.5 TABLET ORAL DAILY
Refills: 0 | Status: ACTIVE | COMMUNITY